# Patient Record
Sex: FEMALE | Race: WHITE | NOT HISPANIC OR LATINO | Employment: OTHER | URBAN - METROPOLITAN AREA
[De-identification: names, ages, dates, MRNs, and addresses within clinical notes are randomized per-mention and may not be internally consistent; named-entity substitution may affect disease eponyms.]

---

## 2017-06-01 ENCOUNTER — ALLSCRIPTS OFFICE VISIT (OUTPATIENT)
Dept: OTHER | Facility: OTHER | Age: 67
End: 2017-06-01

## 2017-06-01 DIAGNOSIS — M25.571 PAIN IN RIGHT ANKLE: ICD-10-CM

## 2017-06-01 DIAGNOSIS — Z13.6 ENCOUNTER FOR SCREENING FOR CARDIOVASCULAR DISORDERS: ICD-10-CM

## 2017-06-01 DIAGNOSIS — Z12.31 ENCOUNTER FOR SCREENING MAMMOGRAM FOR MALIGNANT NEOPLASM OF BREAST: ICD-10-CM

## 2017-06-01 DIAGNOSIS — R04.0 EPISTAXIS: ICD-10-CM

## 2017-07-31 ENCOUNTER — GENERIC CONVERSION - ENCOUNTER (OUTPATIENT)
Dept: OTHER | Facility: OTHER | Age: 67
End: 2017-07-31

## 2017-10-31 ENCOUNTER — GENERIC CONVERSION - ENCOUNTER (OUTPATIENT)
Dept: OTHER | Facility: OTHER | Age: 67
End: 2017-10-31

## 2017-10-31 DIAGNOSIS — N39.0 URINARY TRACT INFECTION: ICD-10-CM

## 2018-01-10 NOTE — MISCELLANEOUS
Message   Recorded as Task   Date: 10/31/2017 01:35 PM, Created By: Nupur Suarez   Task Name: Medical Complaint Callback   Assigned To: Alejandro ROSSI,TEAM   Regarding Patient: Lisa Rhodes, Status: Active   Comment:    Cristal Romano - 31 Oct 2017 1:35 PM     TASK CREATED  Caller: Self; Medical Complaint; [phone] (Home)  PATIENT CALLED SAYING SHE HAS AN INFECTION AND WANTS TO HAVE A CULTURE DONE PLEASE CALL HER BACK  [address] - 31 Oct 2017 2:02 PM     TASK EDITED  SPOKE TO PT, HAS PRESSURE AND FREQ  SLIP FOR CULTURE FAXED TO [facility] [phone]  WILL DIRECT TO DR Alem Julian FOR ANTIBIOTIC ORDER  PER DR AARYA BACTRIM DS BID X'S ONE WEEK ERX TO PHARM  PT NOTIFIED  1        1 Amended By: Royce Resendiz; Oct 31 2017 2:38 PM EST    Active Problems   1  Ankle pain, right (719 47) (M22 571)  2  Displaced fracture of second metatarsal bone of right foot, initial encounter  3  Encounter for screening for cardiovascular disorders (V81 2) (Z13 6)  4  Encounter for screening mammogram for breast cancer (V76 12) (Z12 31)  5  MS (multiple sclerosis) (340) (G35)  6  Recurrent epistaxis (784 7) (R04 0)    Current Meds  1  Aspirin-Dipyridamole ER  MG Oral Capsule Extended Release 12 Hour; TAKE 1   CAPSULE TWICE DAILY; Therapy: 55LHM1130 to (Evaluate:69Oif7698); Last Rx:01Jun2017 Ordered  2  Baclofen 10 MG Oral Tablet; TAKE 1 TABLET 4 TIMES DAILY; Therapy: (Recorded:01Jun2017) to Recorded  3  Copaxone 40 MG/ML Subcutaneous Solution Prefilled Syringe (Glatiramer Acetate); Inject every other day; Therapy: 38QQH8601 to (Last Rx:01Jun2017) Ordered  4  Lyrica 50 MG Oral Capsule; TAKE 1 CAPSULE Bedtime; Therapy: (Recorded:01Jun2017) to Recorded  5  Methadone HCl - 10 MG Oral Tablet; TAKE 1 TABLET 3 TIMES DAILY; Therapy: (Recorded:01Jun2017) to Recorded  6  Morphine Sulfate 15 MG/12HR TBCR; TAKE 1 TABLET 3 times daily;    Therapy: (Recorded:01Jun2017) to Recorded  7  Ritalin 20 MG Oral Tablet (Methylphenidate HCl); TAKE 1 TABLET 3 TIMES DAILY; Therapy: (Recorded:01Jun2017) to Recorded  8  Synthroid 200 MCG Oral Tablet (Levothyroxine Sodium); TAKE 1 TABLET DAILY AS   DIRECTED; Therapy: (Recorded:01Jun2017) to Recorded  9  Wellbutrin  MG Oral Tablet Extended Release 12 Hour (BuPROPion HCl ER   (SR)); TAKE 1 TABLET DAILY; Therapy: (Recorded:01Jun2017) to Recorded    Allergies   1  Penicillins   2  No Known Environmental Allergies  3   No Known Food Allergies    Signatures   Electronically signed by : Neva Silva RN; Oct 31 2017  2:38PM EST                       (Author)

## 2018-01-14 VITALS — TEMPERATURE: 98.7 F | HEART RATE: 90 BPM | SYSTOLIC BLOOD PRESSURE: 116 MMHG | DIASTOLIC BLOOD PRESSURE: 78 MMHG

## 2018-01-17 NOTE — MISCELLANEOUS
Message   Recorded as Task   Date: 07/31/2017 12:57 PM, Created By: Gary Cramer   Task Name: Call Back   Assigned To: Alejandro ROSSI,TEAM   Regarding Patient: Javier Huertas, Status: Active   Comment:    RosalinaCristal - 31 Jul 2017 12:57 PM     TASK CREATED  Caller: BUSHRA, Spouse; Other; [phone]  PATIENTS  BUSHRA CALLED TO SPEAK WITH A NURSE ABOUT CATH SUPPLIES   Cristal Romano - 31 Jul 2017 1:43 PM     TASK REASSIGNED: Previously Assigned To [id number] [employer] - 31 Jul 2017 2:13 PM     TASK EDITED  Called [facility] to renew prescription for 14fr straight caths  CIC 6x's daily per  and requesting to increase to 7x's  Called [facility] at [phone]  Will clarify order with Dr Sina Walsh on 8/1  Emergency supply shipped today  Will notify   Pt  stating she cath's QID  Active Problems    1  Ankle pain, right (719 47) (M27 571)   2  Displaced fracture of second metatarsal bone of right foot, initial encounter (825 25)   (S92 321A)   3  Encounter for screening for cardiovascular disorders (V81 2) (Z13 6)   4  Encounter for screening mammogram for breast cancer (V76 12) (Z12 31)   5  MS (multiple sclerosis) (340) (G35)   6  Recurrent epistaxis (784 7) (R04 0)    Current Meds   1  Aspirin-Dipyridamole ER  MG Oral Capsule Extended Release 12 Hour; TAKE 1   CAPSULE TWICE DAILY; Therapy: 70JJL8517 to (Evaluate:27Izp1701); Last Rx:01Jun2017 Ordered   2  Baclofen 10 MG Oral Tablet; TAKE 1 TABLET 4 TIMES DAILY; Therapy: (Recorded:01Jun2017) to Recorded   3  Copaxone 40 MG/ML Subcutaneous Solution Prefilled Syringe; Inject every other day; Therapy: 70HIG7900 to (Last Rx:01Jun2017) Ordered   4  Lyrica 50 MG Oral Capsule; TAKE 1 CAPSULE Bedtime; Therapy: (Recorded:01Jun2017) to Recorded   5  Methadone HCl - 10 MG Oral Tablet; TAKE 1 TABLET 3 TIMES DAILY; Therapy: (Recorded:01Jun2017) to Recorded   6   Morphine Sulfate 15 MG/12HR TBCR; TAKE 1 TABLET 3 times daily; Therapy: (Recorded:01Jun2017) to Recorded   7  Ritalin 20 MG Oral Tablet (Methylphenidate HCl); TAKE 1 TABLET 3 TIMES DAILY; Therapy: (Recorded:01Jun2017) to Recorded   8  Synthroid 200 MCG Oral Tablet (Levothyroxine Sodium); TAKE 1 TABLET DAILY AS   DIRECTED; Therapy: (Recorded:01Jun2017) to Recorded   9  Wellbutrin  MG Oral Tablet Extended Release 12 Hour (BuPROPion HCl ER   (SR)); TAKE 1 TABLET DAILY; Therapy: (Recorded:01Jun2017) to Recorded    Allergies    1  Penicillins    2  No Known Environmental Allergies   3   No Known Food Allergies    Signatures   Electronically signed by : Jo Ann Richardson RN; Jul 31 2017  2:14PM EST                       (Author)

## 2018-09-25 ENCOUNTER — TRANSCRIBE ORDERS (OUTPATIENT)
Dept: ADMINISTRATIVE | Facility: HOSPITAL | Age: 68
End: 2018-09-25

## 2018-09-25 ENCOUNTER — APPOINTMENT (OUTPATIENT)
Dept: LAB | Facility: IMAGING CENTER | Age: 68
End: 2018-09-25
Payer: MEDICARE

## 2018-09-25 DIAGNOSIS — N39.0 URINARY TRACT INFECTION: ICD-10-CM

## 2018-09-25 PROCEDURE — 87086 URINE CULTURE/COLONY COUNT: CPT

## 2018-09-25 PROCEDURE — 87077 CULTURE AEROBIC IDENTIFY: CPT

## 2018-09-25 PROCEDURE — 87186 SC STD MICRODIL/AGAR DIL: CPT

## 2018-09-26 ENCOUNTER — TRANSCRIBE ORDERS (OUTPATIENT)
Dept: ADMINISTRATIVE | Facility: HOSPITAL | Age: 68
End: 2018-09-26

## 2018-09-27 DIAGNOSIS — N30.00 ACUTE CYSTITIS WITHOUT HEMATURIA: Primary | ICD-10-CM

## 2018-09-27 LAB — BACTERIA UR CULT: ABNORMAL

## 2018-09-27 RX ORDER — CIPROFLOXACIN 250 MG/1
250 TABLET, FILM COATED ORAL EVERY 12 HOURS SCHEDULED
Qty: 14 TABLET | Refills: 0 | Status: SHIPPED | OUTPATIENT
Start: 2018-09-27 | End: 2018-10-04

## 2018-10-05 ENCOUNTER — TELEPHONE (OUTPATIENT)
Dept: UROLOGY | Facility: AMBULATORY SURGERY CENTER | Age: 68
End: 2018-10-05

## 2018-10-05 NOTE — TELEPHONE ENCOUNTER
Pt states that she has a lot of bladder pressure, she is emptying due to CIC  Pt states that she is CIC more than BID  Per culture she is on the correct ABX  After looking into chart the last time she was here was in 2016 and was seen by Raine Rodriguez  Last time she was seen by Dr Oscar Alvarado was 2015  Pt was given appt with Dr Oscar Alvarado 10/08/18 to discuss

## 2018-10-25 ENCOUNTER — TELEPHONE (OUTPATIENT)
Dept: UROLOGY | Facility: MEDICAL CENTER | Age: 68
End: 2018-10-25

## 2018-10-25 ENCOUNTER — TRANSCRIBE ORDERS (OUTPATIENT)
Dept: ADMINISTRATIVE | Facility: HOSPITAL | Age: 68
End: 2018-10-25

## 2018-10-25 DIAGNOSIS — R30.0 BURNING WITH URINATION: Primary | ICD-10-CM

## 2018-10-25 NOTE — TELEPHONE ENCOUNTER
Spoke with the patient; She complains of urinary urgency and burning with urination  I've requested she have a urine culture  She'd like to use the [facility] lab in [place]  Order faxed to [phone]

## 2018-10-26 ENCOUNTER — TRANSCRIBE ORDERS (OUTPATIENT)
Dept: ADMINISTRATIVE | Facility: HOSPITAL | Age: 68
End: 2018-10-26

## 2018-10-26 ENCOUNTER — APPOINTMENT (OUTPATIENT)
Dept: LAB | Facility: IMAGING CENTER | Age: 68
End: 2018-10-26
Payer: MEDICARE

## 2018-10-26 DIAGNOSIS — R30.0 BURNING WITH URINATION: ICD-10-CM

## 2018-10-26 PROCEDURE — 87086 URINE CULTURE/COLONY COUNT: CPT

## 2018-10-26 PROCEDURE — 87077 CULTURE AEROBIC IDENTIFY: CPT

## 2018-10-26 PROCEDURE — 87186 SC STD MICRODIL/AGAR DIL: CPT

## 2018-10-28 LAB — BACTERIA UR CULT: ABNORMAL

## 2018-10-30 DIAGNOSIS — N30.00 ACUTE CYSTITIS WITHOUT HEMATURIA: Primary | ICD-10-CM

## 2018-10-30 RX ORDER — SULFAMETHOXAZOLE AND TRIMETHOPRIM 800; 160 MG/1; MG/1
1 TABLET ORAL EVERY 12 HOURS SCHEDULED
Qty: 14 TABLET | Refills: 0 | Status: SHIPPED | OUTPATIENT
Start: 2018-10-30 | End: 2018-11-06

## 2018-11-01 NOTE — TELEPHONE ENCOUNTER
Pt notified that her  was notified of  positive culture on 10/30/18 and that antibiotics were escribed on the 10/30/18  Pt stated that [facility] told her there was no antibiotics ready for her  Contacted [facility] and they stated they have the RX ready for the pt

## 2018-12-05 ENCOUNTER — TELEPHONE (OUTPATIENT)
Dept: UROLOGY | Facility: MEDICAL CENTER | Age: 68
End: 2018-12-05

## 2018-12-05 ENCOUNTER — TRANSCRIBE ORDERS (OUTPATIENT)
Dept: ADMINISTRATIVE | Facility: HOSPITAL | Age: 68
End: 2018-12-05

## 2018-12-05 ENCOUNTER — APPOINTMENT (OUTPATIENT)
Dept: LAB | Facility: IMAGING CENTER | Age: 68
End: 2018-12-05
Payer: MEDICARE

## 2018-12-05 DIAGNOSIS — N30.90 CYSTITIS: Primary | ICD-10-CM

## 2018-12-05 DIAGNOSIS — N30.90 CYSTITIS: ICD-10-CM

## 2018-12-05 PROCEDURE — 87077 CULTURE AEROBIC IDENTIFY: CPT

## 2018-12-05 PROCEDURE — 87147 CULTURE TYPE IMMUNOLOGIC: CPT

## 2018-12-05 PROCEDURE — 87186 SC STD MICRODIL/AGAR DIL: CPT

## 2018-12-05 PROCEDURE — 87086 URINE CULTURE/COLONY COUNT: CPT

## 2018-12-07 ENCOUNTER — OFFICE VISIT (OUTPATIENT)
Dept: INTERNAL MEDICINE CLINIC | Facility: CLINIC | Age: 68
End: 2018-12-07
Payer: MEDICARE

## 2018-12-07 VITALS
TEMPERATURE: 99 F | OXYGEN SATURATION: 94 % | SYSTOLIC BLOOD PRESSURE: 118 MMHG | HEART RATE: 110 BPM | DIASTOLIC BLOOD PRESSURE: 76 MMHG

## 2018-12-07 DIAGNOSIS — R11.0 NAUSEA: Primary | ICD-10-CM

## 2018-12-07 DIAGNOSIS — R53.83 FATIGUE, UNSPECIFIED TYPE: ICD-10-CM

## 2018-12-07 DIAGNOSIS — Z12.31 SCREENING MAMMOGRAM, ENCOUNTER FOR: ICD-10-CM

## 2018-12-07 DIAGNOSIS — E55.9 VITAMIN D DEFICIENCY: ICD-10-CM

## 2018-12-07 DIAGNOSIS — G35 MULTIPLE SCLEROSIS (HCC): ICD-10-CM

## 2018-12-07 DIAGNOSIS — Z13.220 SCREENING CHOLESTEROL LEVEL: ICD-10-CM

## 2018-12-07 DIAGNOSIS — Z23 ENCOUNTER FOR IMMUNIZATION: ICD-10-CM

## 2018-12-07 DIAGNOSIS — Z11.59 ENCOUNTER FOR HEPATITIS C SCREENING TEST FOR LOW RISK PATIENT: ICD-10-CM

## 2018-12-07 DIAGNOSIS — K59.03 DRUG-INDUCED CONSTIPATION: ICD-10-CM

## 2018-12-07 LAB — BACTERIA UR CULT: ABNORMAL

## 2018-12-07 PROCEDURE — G0008 ADMIN INFLUENZA VIRUS VAC: HCPCS

## 2018-12-07 PROCEDURE — 90662 IIV NO PRSV INCREASED AG IM: CPT

## 2018-12-07 PROCEDURE — 99213 OFFICE O/P EST LOW 20 MIN: CPT | Performed by: FAMILY MEDICINE

## 2018-12-07 RX ORDER — PROMETHAZINE HYDROCHLORIDE 25 MG/1
25 TABLET ORAL EVERY 6 HOURS PRN
Qty: 60 TABLET | Refills: 5 | Status: SHIPPED | OUTPATIENT
Start: 2018-12-07

## 2018-12-07 RX ORDER — PROMETHAZINE HYDROCHLORIDE 25 MG/1
25 SUPPOSITORY RECTAL EVERY 6 HOURS PRN
Qty: 12 EACH | Refills: 5 | Status: SHIPPED | OUTPATIENT
Start: 2018-12-07

## 2018-12-07 NOTE — PROGRESS NOTES
Assessment/Plan:    No problem-specific Assessment & Plan notes found for this encounter  Problem List Items Addressed This Visit        Digestive    Drug-induced constipation       Other    Vitamin D deficiency    Relevant Orders    Vitamin D 25 hydroxy      Other Visit Diagnoses     Nausea    -  Primary    Relevant Medications    promethazine (PHENERGAN) 25 mg suppository    promethazine (PHENERGAN) 25 mg tablet    Other Relevant Orders    Comprehensive metabolic panel    CBC and differential    Multiple sclerosis ([facility] [address] )        Relevant Orders    Ambulatory referral to Physical Medicine Rehab    Fatigue, unspecified type        Relevant Orders    TSH baseline    T4, free    Vitamin D 25 hydroxy    Encounter for hepatitis C screening test for low risk patient        Relevant Orders    Hepatitis C antibody    Screening mammogram, encounter for        Relevant Orders    Mammo screening bilateral w cad    Screening cholesterol level        Relevant Orders    Lipid panel    Encounter for immunization        Relevant Orders    influenza vaccine, [id number], high-dose, PF 0 5 mL, for patients 65 yr+ (FLUZONE HIGH-DOSE) (Completed)              Discussion, old records and medication list reviewed with patient  Refer to Dr Don physical medicine rehab for MS and medical marijuana  Will put in consult for [facility]  for possible physical therapy as well as home lab draws  Schedule mammogram and will discuss bone density study at next visit  Flu shot given today and will need pneumonia vaccine at next visit  Laboratory data reviewed from 2016 and will order lab work for visit in January  She has an appointment for EGD scheduled and they will most likely order colonoscopy as well  Refills given for Phenergan suppository as well as Phenergan oral     Subjective:  Establish care     Patient ID: Pernell Bateman is a 70 y o  female  HPI     78-year-old female with history of MS since 79    Following with Neurology on a regular basis  Coming here today for establishing care  Has not had a primary care doctor in several years and has not had Pap test mammogram or any other preventative measures  Is due for lab work  Last lab work in system are at [email or web address] in 2016  MS is stable and nonprogressive however she can stand and pivot but cannot walk anymore  She is basically wheelchair confined   is primary caretaker      The following portions of the patient's history were reviewed and updated as appropriate: allergies, current medications, past family history, past medical history, past social history, past surgical history and problem list     Review of Systems      Constitutional:  Denies fever or chills   Eyes:  Denies change in visual acuity   HENT:  Denies nasal congestion or sore throat   Respiratory:  Denies cough or shortness of breath or wheezing  Cardiovascular:  Denies palpitations or chest pain  GI:  Denies abdominal pain,  or vomiting, has intermittent nausea as well as diarrhea and constipation    Stools are mostly constipated however does not abut diarrhea and her all of her specialists have told her it is from [address]:  Denies rash   Neurologic:  Denies headache or focal weakness        Objective:      /76 (BP Location: Left arm, Patient Position: Sitting, Cuff Size: Adult)   Pulse (!) 110   Temp 99 °F (37 2 °C) (Oral)   SpO2 94%          Physical Exam      Constitutional:  Well developed, well nourished, no acute distress, non-toxic appearance   Eyes:  PERRL, conjunctiva normal , non icteric sclera  HENT:  Atraumatic, oropharynx moist  Neck-  supple   Respiratory:  CTA b/l, normal breath sounds, no rales, no wheezing   Cardiovascular:  RRR, no murmurs, no LE edema b/l  GI:  Soft, nondistended, normal bowel sounds x 4, nontender, no organomegaly, no mass, no rebound, no guarding   Neurologic:  no focal deficits noted   Psychiatric:  Speech and behavior appropriate , AAO x 3

## 2018-12-12 ENCOUNTER — TELEPHONE (OUTPATIENT)
Dept: UROLOGY | Facility: AMBULATORY SURGERY CENTER | Age: 68
End: 2018-12-12

## 2018-12-12 DIAGNOSIS — N30.00 ACUTE CYSTITIS WITHOUT HEMATURIA: Primary | ICD-10-CM

## 2018-12-12 RX ORDER — NITROFURANTOIN 25; 75 MG/1; MG/1
100 CAPSULE ORAL 2 TIMES DAILY
Qty: 14 CAPSULE | Refills: 0 | Status: SHIPPED | OUTPATIENT
Start: 2018-12-12 | End: 2019-01-11 | Stop reason: ALTCHOICE

## 2018-12-12 NOTE — TELEPHONE ENCOUNTER
Spoke to pt  States she is having pressure, freq urine cloudy  Afebrile  12/5 culture showed only 40-49 thous cfu/ml of Enterococcus  Will direct to Dr Debra Jimenez if he would like to repeat culture or treat symptoms

## 2018-12-21 ENCOUNTER — APPOINTMENT (OUTPATIENT)
Dept: LAB | Facility: IMAGING CENTER | Age: 68
End: 2018-12-21
Payer: MEDICARE

## 2018-12-21 ENCOUNTER — TRANSCRIBE ORDERS (OUTPATIENT)
Dept: ADMINISTRATIVE | Facility: HOSPITAL | Age: 68
End: 2018-12-21

## 2018-12-21 DIAGNOSIS — G35 MULTIPLE SCLEROSIS (HCC): ICD-10-CM

## 2018-12-21 DIAGNOSIS — M79.662 PAIN AND SWELLING OF LEFT LOWER LEG: ICD-10-CM

## 2018-12-21 DIAGNOSIS — M79.89 PAIN AND SWELLING OF LEFT LOWER LEG: Primary | ICD-10-CM

## 2018-12-21 DIAGNOSIS — Z11.59 ENCOUNTER FOR HEPATITIS C SCREENING TEST FOR LOW RISK PATIENT: ICD-10-CM

## 2018-12-21 DIAGNOSIS — M79.662 PAIN AND SWELLING OF LEFT LOWER LEG: Primary | ICD-10-CM

## 2018-12-21 DIAGNOSIS — Z51.81 THERAPEUTIC DRUG MONITORING: ICD-10-CM

## 2018-12-21 DIAGNOSIS — Z13.220 SCREENING CHOLESTEROL LEVEL: ICD-10-CM

## 2018-12-21 DIAGNOSIS — E55.9 VITAMIN D DEFICIENCY, UNSPECIFIED: ICD-10-CM

## 2018-12-21 DIAGNOSIS — E55.9 VITAMIN D DEFICIENCY: ICD-10-CM

## 2018-12-21 DIAGNOSIS — R11.0 NAUSEA: ICD-10-CM

## 2018-12-21 DIAGNOSIS — R53.83 FATIGUE, UNSPECIFIED TYPE: ICD-10-CM

## 2018-12-21 DIAGNOSIS — M79.89 PAIN AND SWELLING OF LEFT LOWER LEG: ICD-10-CM

## 2018-12-21 LAB
25(OH)D3 SERPL-MCNC: 19.4 NG/ML (ref 30–100)
ALBUMIN SERPL BCP-MCNC: 3.2 G/DL (ref 3.5–5)
ALP SERPL-CCNC: 94 U/L (ref 46–116)
ALT SERPL W P-5'-P-CCNC: 60 U/L (ref 12–78)
ANION GAP SERPL CALCULATED.3IONS-SCNC: 5 MMOL/L (ref 4–13)
AST SERPL W P-5'-P-CCNC: 43 U/L (ref 5–45)
BASOPHILS # BLD AUTO: 0.04 THOUSANDS/ΜL (ref 0–0.1)
BASOPHILS NFR BLD AUTO: 1 % (ref 0–1)
BILIRUB SERPL-MCNC: 0.32 MG/DL (ref 0.2–1)
BUN SERPL-MCNC: 14 MG/DL (ref 5–25)
CALCIUM SERPL-MCNC: 9.2 MG/DL (ref 8.3–10.1)
CHLORIDE SERPL-SCNC: 104 MMOL/L (ref 100–108)
CHOLEST SERPL-MCNC: 237 MG/DL (ref 50–200)
CO2 SERPL-SCNC: 30 MMOL/L (ref 21–32)
CREAT SERPL-MCNC: 0.68 MG/DL (ref 0.6–1.3)
EOSINOPHIL # BLD AUTO: 0.17 THOUSAND/ΜL (ref 0–0.61)
EOSINOPHIL NFR BLD AUTO: 2 % (ref 0–6)
ERYTHROCYTE [DISTWIDTH] IN BLOOD BY AUTOMATED COUNT: 13.8 % (ref 11.6–15.1)
FOLATE SERPL-MCNC: 18.6 NG/ML (ref 3.1–17.5)
GFR SERPL CREATININE-BSD FRML MDRD: 90 ML/MIN/1.73SQ M
GLUCOSE P FAST SERPL-MCNC: 94 MG/DL (ref 65–99)
HCT VFR BLD AUTO: 39.4 % (ref 34.8–46.1)
HDLC SERPL-MCNC: 55 MG/DL (ref 40–60)
HGB BLD-MCNC: 12.2 G/DL (ref 11.5–15.4)
IMM GRANULOCYTES # BLD AUTO: 0.02 THOUSAND/UL (ref 0–0.2)
IMM GRANULOCYTES NFR BLD AUTO: 0 % (ref 0–2)
LDLC SERPL CALC-MCNC: 146 MG/DL (ref 0–100)
LYMPHOCYTES # BLD AUTO: 1.62 THOUSANDS/ΜL (ref 0.6–4.47)
LYMPHOCYTES NFR BLD AUTO: 21 % (ref 14–44)
MCH RBC QN AUTO: 30.1 PG (ref 26.8–34.3)
MCHC RBC AUTO-ENTMCNC: 31 G/DL (ref 31.4–37.4)
MCV RBC AUTO: 97 FL (ref 82–98)
MONOCYTES # BLD AUTO: 0.4 THOUSAND/ΜL (ref 0.17–1.22)
MONOCYTES NFR BLD AUTO: 5 % (ref 4–12)
NEUTROPHILS # BLD AUTO: 5.44 THOUSANDS/ΜL (ref 1.85–7.62)
NEUTS SEG NFR BLD AUTO: 71 % (ref 43–75)
NONHDLC SERPL-MCNC: 182 MG/DL
NRBC BLD AUTO-RTO: 0 /100 WBCS
PLATELET # BLD AUTO: 213 THOUSANDS/UL (ref 149–390)
PMV BLD AUTO: 9.6 FL (ref 8.9–12.7)
POTASSIUM SERPL-SCNC: 4 MMOL/L (ref 3.5–5.3)
PROT SERPL-MCNC: 6.6 G/DL (ref 6.4–8.2)
RBC # BLD AUTO: 4.05 MILLION/UL (ref 3.81–5.12)
SODIUM SERPL-SCNC: 139 MMOL/L (ref 136–145)
T4 FREE SERPL-MCNC: 0.75 NG/DL (ref 0.76–1.46)
TRIGL SERPL-MCNC: 181 MG/DL
TSH SERPL DL<=0.05 MIU/L-ACNC: 0.06 UIU/ML
TSH SERPL DL<=0.05 MIU/L-ACNC: 0.07 UIU/ML (ref 0.36–3.74)
VIT B12 SERPL-MCNC: 343 PG/ML (ref 100–900)
WBC # BLD AUTO: 7.69 THOUSAND/UL (ref 4.31–10.16)

## 2018-12-21 PROCEDURE — 82607 VITAMIN B-12: CPT

## 2018-12-21 PROCEDURE — 86147 CARDIOLIPIN ANTIBODY EA IG: CPT

## 2018-12-21 PROCEDURE — 84425 ASSAY OF VITAMIN B-1: CPT

## 2018-12-21 PROCEDURE — 84439 ASSAY OF FREE THYROXINE: CPT

## 2018-12-21 PROCEDURE — 84207 ASSAY OF VITAMIN B-6: CPT

## 2018-12-21 PROCEDURE — 84443 ASSAY THYROID STIM HORMONE: CPT

## 2018-12-21 PROCEDURE — 86803 HEPATITIS C AB TEST: CPT

## 2018-12-21 PROCEDURE — 81241 F5 GENE: CPT

## 2018-12-21 PROCEDURE — 80053 COMPREHEN METABOLIC PANEL: CPT

## 2018-12-21 PROCEDURE — 85025 COMPLETE CBC W/AUTO DIFF WBC: CPT

## 2018-12-21 PROCEDURE — 85613 RUSSELL VIPER VENOM DILUTED: CPT

## 2018-12-21 PROCEDURE — 36415 COLL VENOUS BLD VENIPUNCTURE: CPT

## 2018-12-21 PROCEDURE — 85732 THROMBOPLASTIN TIME PARTIAL: CPT

## 2018-12-21 PROCEDURE — 86146 BETA-2 GLYCOPROTEIN ANTIBODY: CPT

## 2018-12-21 PROCEDURE — 80061 LIPID PANEL: CPT

## 2018-12-21 PROCEDURE — 82306 VITAMIN D 25 HYDROXY: CPT

## 2018-12-21 PROCEDURE — 85705 THROMBOPLASTIN INHIBITION: CPT

## 2018-12-21 PROCEDURE — 82746 ASSAY OF FOLIC ACID SERUM: CPT

## 2018-12-22 LAB — HCV AB SER QL: NORMAL

## 2018-12-27 LAB — F5 GENE MUT ANL BLD/T: NORMAL

## 2018-12-28 LAB
VIT B1 BLD-SCNC: 126.3 NMOL/L (ref 66.5–200)
VIT B6 SERPL-MCNC: 12.7 UG/L (ref 2–32.8)

## 2018-12-31 ENCOUNTER — TRANSITIONAL CARE MANAGEMENT (OUTPATIENT)
Dept: INTERNAL MEDICINE CLINIC | Facility: CLINIC | Age: 68
End: 2018-12-31

## 2018-12-31 RX ORDER — ASPIRIN AND DIPYRIDAMOLE 25; 200 MG/1; MG/1
1 CAPSULE, EXTENDED RELEASE ORAL EVERY 12 HOURS SCHEDULED
COMMUNITY

## 2018-12-31 RX ORDER — CEPHALEXIN 500 MG/1
500 CAPSULE ORAL 4 TIMES DAILY
COMMUNITY
Start: 2018-12-29 | End: 2019-01-01

## 2018-12-31 RX ORDER — GLATIRAMER ACETATE 20 MG/ML
20 INJECTION, SOLUTION SUBCUTANEOUS
COMMUNITY
End: 2020-09-22 | Stop reason: ALTCHOICE

## 2018-12-31 RX ORDER — PROCHLORPERAZINE 25 MG
25 SUPPOSITORY, RECTAL RECTAL DAILY PRN
COMMUNITY

## 2018-12-31 RX ORDER — ALENDRONATE SODIUM 70 MG/1
70 TABLET ORAL
COMMUNITY
End: 2019-01-11 | Stop reason: SDUPTHER

## 2019-01-02 LAB — MISCELLANEOUS LAB TEST RESULT: NORMAL

## 2019-01-10 RX ORDER — PANTOPRAZOLE SODIUM 40 MG/1
40 TABLET, DELAYED RELEASE ORAL DAILY
COMMUNITY
Start: 2018-12-03 | End: 2019-04-16 | Stop reason: SDUPTHER

## 2019-01-10 RX ORDER — LINACLOTIDE 290 UG/1
1 CAPSULE, GELATIN COATED ORAL DAILY
COMMUNITY
Start: 2018-11-13 | End: 2022-07-06

## 2019-01-11 ENCOUNTER — OFFICE VISIT (OUTPATIENT)
Dept: INTERNAL MEDICINE CLINIC | Facility: CLINIC | Age: 69
End: 2019-01-11
Payer: MEDICARE

## 2019-01-11 VITALS
OXYGEN SATURATION: 91 % | TEMPERATURE: 98.9 F | DIASTOLIC BLOOD PRESSURE: 58 MMHG | SYSTOLIC BLOOD PRESSURE: 104 MMHG | HEART RATE: 74 BPM

## 2019-01-11 DIAGNOSIS — E55.9 VITAMIN D DEFICIENCY: Primary | ICD-10-CM

## 2019-01-11 DIAGNOSIS — E78.00 HYPERCHOLESTEREMIA: ICD-10-CM

## 2019-01-11 DIAGNOSIS — K59.03 DRUG-INDUCED CONSTIPATION: ICD-10-CM

## 2019-01-11 DIAGNOSIS — G89.4 CHRONIC PAIN SYNDROME: ICD-10-CM

## 2019-01-11 DIAGNOSIS — Z23 NEED FOR PNEUMOCOCCAL VACCINATION: ICD-10-CM

## 2019-01-11 DIAGNOSIS — G35 MULTIPLE SCLEROSIS (HCC): ICD-10-CM

## 2019-01-11 DIAGNOSIS — M81.0 POSTMENOPAUSAL OSTEOPOROSIS: ICD-10-CM

## 2019-01-11 DIAGNOSIS — Z02.82 ADOPTED: ICD-10-CM

## 2019-01-11 PROBLEM — Z78.9 ADOPTED: Status: ACTIVE | Noted: 2019-01-11

## 2019-01-11 PROCEDURE — G0009 ADMIN PNEUMOCOCCAL VACCINE: HCPCS

## 2019-01-11 PROCEDURE — 99495 TRANSJ CARE MGMT MOD F2F 14D: CPT | Performed by: FAMILY MEDICINE

## 2019-01-11 PROCEDURE — 90732 PPSV23 VACC 2 YRS+ SUBQ/IM: CPT

## 2019-01-11 RX ORDER — ALENDRONATE SODIUM 70 MG/1
70 TABLET ORAL
Qty: 12 TABLET | Refills: 3 | Status: SHIPPED | OUTPATIENT
Start: 2019-01-11 | End: 2019-01-11 | Stop reason: SDUPTHER

## 2019-01-11 RX ORDER — PREGABALIN 50 MG/1
50 CAPSULE ORAL 2 TIMES DAILY
Qty: 180 CAPSULE | Refills: 1 | Status: SHIPPED | OUTPATIENT
Start: 2019-01-11 | End: 2019-05-20 | Stop reason: SDUPTHER

## 2019-01-11 RX ORDER — ERGOCALCIFEROL 1.25 MG/1
50000 CAPSULE ORAL WEEKLY
Qty: 12 CAPSULE | Refills: 1 | Status: SHIPPED | OUTPATIENT
Start: 2019-01-11 | End: 2019-05-20 | Stop reason: SDUPTHER

## 2019-01-11 RX ORDER — ALPRAZOLAM 1 MG/1
1 TABLET ORAL 3 TIMES DAILY PRN
Qty: 90 TABLET | Refills: 0 | Status: CANCELLED | OUTPATIENT
Start: 2019-01-11

## 2019-01-11 RX ORDER — ALENDRONATE SODIUM 70 MG/1
70 TABLET ORAL
Qty: 12 TABLET | Refills: 3 | Status: SHIPPED | OUTPATIENT
Start: 2019-01-11 | End: 2019-05-20 | Stop reason: SDUPTHER

## 2019-01-11 NOTE — PROGRESS NOTES
Assessment/Plan:    No problem-specific Assessment & Plan notes found for this encounter  Problem List Items Addressed This Visit        Digestive    Drug-induced constipation       Nervous and Auditory    Multiple sclerosis (HCC)    Relevant Medications    pregabalin (LYRICA) 50 mg capsule       Other    Vitamin D deficiency - Primary    Relevant Medications    ergocalciferol (VITAMIN D2) 50,000 units    Other Relevant Orders    Vitamin D 25 hydroxy    Chronic pain    Adopted      Other Visit Diagnoses     Hypercholesteremia        Relevant Orders    Lipid panel    Postmenopausal osteoporosis        Relevant Medications    alendronate (FOSAMAX) 70 mg tablet    Other Relevant Orders    DXA bone density spine hip and pelvis    Need for pneumococcal vaccination        Relevant Orders    PNEUMOCOCCAL POLYSACCHARIDE VACCINE 23-VALENT =>1YO SQ IM (Completed)            Discussion, an appointment with medical marijuana physician coming up in a few weeks  Will increase her Lyrica to twice a day  Call if any issues or increased somnolence  If not effective will increase the mg he usage  No changes to other medications  Cholesterol reviewed today which is high  No previous ones to compare  Patient has a biological sister who is also high cholesterol  She is not aware family history with mom and dad due to being adopted  Discussed dietary changes and increased activity while sitting to the best of her ability  Hold statin for now repeat lab work in 4 months  Replete vitamin-D with weekly dose  Level is 19  Check labs in 4 months         Subjective:  Hospital follow-up cellulitis     Patient ID: Morelia Cartwright is a 70 y o  female  HPI  "Hospital Course  This is a brief description of the patient's hospital stay; please refer to medical chart for further details  Morelia Cartwright is a 70 y o  female with past medical history as mentioned below   She presented to [facility] on 12/25/2018 with complaint of Left Leg swelling and Left foot pain and redness   She was diagnosed with Left Leg cellulitis  She was started on keflex opt  However pain and swelling did not improve  Upon evaluation patient had mild erythema noted along lateral foot  However significant swelling along her thigh and lower leg  2 duplex on separate occasions both negative for DVT  CT left showed swelling possible myositis  Ancef was started  Pain/swelling/erythema improved but did not resolve  She was transitioned to keflex  Follow up with PCP  "    Update, since being home patient completed her Keflex today  No redness or swelling however there is a usual discrepancy with left lower extremity being larger than right lower extremity  She has pain in the left lower calf however Dopplers done twice in the hospital were negative for DVT  Of note her right lower extremity does not fully extend and stays in a slightly flexed position due to immobilization  She is however able to bear weight and pivot and turn with the assistance of her   She states the pain in her left lower extremity is still there and feels that it is MS related  No changes to any medications were done at home  Hospital course as well as laboratory data and all of her imaging and studies were reviewed with her and her  today  Osteomyelitis was checked for and ruled out      Vitamin-D deficiency  Denies high fatty starchy diet level checked recently is at 19  She is not taking any over-the-counter medications    Hypercholesterolemia, unknown family history from mom and dad since she is adopted however her biological sister has high cholesterol is being treated for    She is not sure where her level has been in the past   LDL is elevated as well as triglycerides      The following portions of the patient's history were reviewed and updated as appropriate: allergies, current medications, past family history, past medical history, past social history, past surgical history and problem list     Review of Systems      Constitutional:  Denies fever or chills   Eyes:  Denies change in visual acuity   HENT:  Denies nasal congestion or sore throat   Respiratory:  Denies cough or shortness of breath or wheezing  Cardiovascular:  Denies palpitations or chest pain  GI:  Denies abdominal pain, nausea, or vomiting  Integument:  Denies rash   Neurologic:  Denies headache or focal weakness  Musculoskeletal, left lower extremity pain, see HPI,      Objective:      /58 (BP Location: Left arm, Patient Position: Sitting, Cuff Size: Standard)   Pulse 74   Temp 98 9 °F (37 2 °C) (Oral)   SpO2 91%          Physical Exam      Constitutional:  Well developed, well nourished, no acute distress, non-toxic appearance   Eyes:  PERRL, conjunctiva normal , non icteric sclera  HENT:  Atraumatic, oropharynx moist  Neck-  supple   Respiratory:  CTA b/l, normal breath sounds, no rales, no wheezing   Cardiovascular:  RRR, no murmurs, no LE edema b/l  GI:  Soft, nondistended, normal bowel sounds x 4, nontender, no organomegaly, no mass, no rebound, no guarding   Neurologic:  no focal deficits noted   Psychiatric:  Speech and behavior appropriate , AAO x 3  Musculoskeletal, in wheelchair, tender left calf but no edema and no erythema and no rashes or increased warmth    Right lower extremity negative exam exam except for being slightly fixed in the flexed position and unable to extend fully

## 2019-04-16 DIAGNOSIS — K21.9 GASTROESOPHAGEAL REFLUX DISEASE WITHOUT ESOPHAGITIS: Primary | ICD-10-CM

## 2019-04-16 RX ORDER — PANTOPRAZOLE SODIUM 40 MG/1
40 TABLET, DELAYED RELEASE ORAL DAILY
Qty: 90 TABLET | Refills: 1 | Status: SHIPPED | OUTPATIENT
Start: 2019-04-16 | End: 2020-01-10 | Stop reason: SDUPTHER

## 2019-05-20 ENCOUNTER — OFFICE VISIT (OUTPATIENT)
Dept: INTERNAL MEDICINE CLINIC | Facility: CLINIC | Age: 69
End: 2019-05-20
Payer: MEDICARE

## 2019-05-20 VITALS
HEART RATE: 88 BPM | SYSTOLIC BLOOD PRESSURE: 100 MMHG | TEMPERATURE: 98.5 F | DIASTOLIC BLOOD PRESSURE: 80 MMHG | OXYGEN SATURATION: 98 %

## 2019-05-20 DIAGNOSIS — E55.9 VITAMIN D DEFICIENCY: ICD-10-CM

## 2019-05-20 DIAGNOSIS — Z12.11 SCREENING FOR COLON CANCER: ICD-10-CM

## 2019-05-20 DIAGNOSIS — F41.9 ANXIETY: ICD-10-CM

## 2019-05-20 DIAGNOSIS — Z23 NEED FOR DIPHTHERIA-TETANUS-PERTUSSIS (TDAP) VACCINE: Primary | ICD-10-CM

## 2019-05-20 DIAGNOSIS — G89.4 CHRONIC PAIN SYNDROME: ICD-10-CM

## 2019-05-20 DIAGNOSIS — K59.03 DRUG-INDUCED CONSTIPATION: ICD-10-CM

## 2019-05-20 DIAGNOSIS — Z99.3 WHEELCHAIR BOUND: ICD-10-CM

## 2019-05-20 DIAGNOSIS — E78.00 HYPERCHOLESTEREMIA: ICD-10-CM

## 2019-05-20 DIAGNOSIS — M81.0 POSTMENOPAUSAL OSTEOPOROSIS: ICD-10-CM

## 2019-05-20 DIAGNOSIS — G35 MULTIPLE SCLEROSIS (HCC): ICD-10-CM

## 2019-05-20 PROCEDURE — 99214 OFFICE O/P EST MOD 30 MIN: CPT | Performed by: FAMILY MEDICINE

## 2019-05-20 RX ORDER — ALPRAZOLAM 1 MG/1
1 TABLET ORAL 2 TIMES DAILY PRN
Qty: 60 TABLET | Refills: 1 | Status: SHIPPED | OUTPATIENT
Start: 2019-05-20 | End: 2019-11-13 | Stop reason: SDUPTHER

## 2019-05-20 RX ORDER — ALENDRONATE SODIUM 70 MG/1
70 TABLET ORAL
Qty: 12 TABLET | Refills: 3 | Status: SHIPPED | OUTPATIENT
Start: 2019-05-20 | End: 2022-04-26 | Stop reason: SDUPTHER

## 2019-05-20 RX ORDER — PREGABALIN 50 MG/1
50 CAPSULE ORAL 2 TIMES DAILY
Qty: 180 CAPSULE | Refills: 1 | Status: SHIPPED | OUTPATIENT
Start: 2019-05-20

## 2019-05-20 RX ORDER — ERGOCALCIFEROL 1.25 MG/1
50000 CAPSULE ORAL WEEKLY
Qty: 12 CAPSULE | Refills: 1 | Status: SHIPPED | OUTPATIENT
Start: 2019-05-20 | End: 2019-10-15 | Stop reason: SDUPTHER

## 2019-05-21 ENCOUNTER — TELEPHONE (OUTPATIENT)
Dept: INTERNAL MEDICINE CLINIC | Facility: CLINIC | Age: 69
End: 2019-05-21

## 2019-05-28 ENCOUNTER — TELEPHONE (OUTPATIENT)
Dept: INTERNAL MEDICINE CLINIC | Facility: CLINIC | Age: 69
End: 2019-05-28

## 2019-08-23 ENCOUNTER — TELEPHONE (OUTPATIENT)
Dept: INTERNAL MEDICINE CLINIC | Facility: CLINIC | Age: 69
End: 2019-08-23

## 2019-08-23 NOTE — TELEPHONE ENCOUNTER
Pt has refills at [facility]  Spoke to   He is aware  No refills needed at this time

## 2019-08-23 NOTE — TELEPHONE ENCOUNTER
Needs refill on Protonix 40 mg sent to [facility], patient requesting a 3 month supply of medication

## 2019-09-27 ENCOUNTER — TELEPHONE (OUTPATIENT)
Dept: INTERNAL MEDICINE CLINIC | Facility: CLINIC | Age: 69
End: 2019-09-27

## 2019-09-27 NOTE — TELEPHONE ENCOUNTER
Needs refill on protonix 40 mg, twice a day, for 3 month supply sent to [facility]

## 2019-09-27 NOTE — TELEPHONE ENCOUNTER
Spoke to pt's  to confirm that the pt is only taking her med once daily  Also called the pharmacy to check if she has refills  Per pharmacist, pt picked up 90 days worth on 08/31/2019 at [facility] pt back and let  know

## 2019-10-07 ENCOUNTER — TELEPHONE (OUTPATIENT)
Dept: INTERNAL MEDICINE CLINIC | Facility: CLINIC | Age: 69
End: 2019-10-07

## 2019-10-07 NOTE — TELEPHONE ENCOUNTER
MED: Vitmain D  SUPPLY: 90Day  PHARMACY: [facility]  PATIENT PHONE #: [phone]  LAST OV: 5/20/2019  UPCOMING OV: 11/18/2019

## 2019-10-15 DIAGNOSIS — E55.9 VITAMIN D DEFICIENCY: ICD-10-CM

## 2019-10-15 RX ORDER — ERGOCALCIFEROL 1.25 MG/1
50000 CAPSULE ORAL WEEKLY
Qty: 12 CAPSULE | Refills: 1 | Status: SHIPPED | OUTPATIENT
Start: 2019-10-15 | End: 2019-12-17 | Stop reason: SDUPTHER

## 2019-11-13 DIAGNOSIS — F41.9 ANXIETY: ICD-10-CM

## 2019-11-13 NOTE — TELEPHONE ENCOUNTER
LOV 05/20/2019  NOV 11/18/2019    Spoke to pt's   They are ok to wait for Dr Sarah Garduno to fill tomorrow, 11/14/2019      PDMP:

## 2019-11-14 RX ORDER — ALPRAZOLAM 1 MG/1
1 TABLET ORAL 2 TIMES DAILY PRN
Qty: 60 TABLET | Refills: 1 | Status: SHIPPED | OUTPATIENT
Start: 2019-11-14 | End: 2020-01-28 | Stop reason: SDUPTHER

## 2019-12-17 ENCOUNTER — TELEPHONE (OUTPATIENT)
Dept: INTERNAL MEDICINE CLINIC | Facility: CLINIC | Age: 69
End: 2019-12-17

## 2019-12-17 DIAGNOSIS — E55.9 VITAMIN D DEFICIENCY: ICD-10-CM

## 2019-12-17 RX ORDER — ERGOCALCIFEROL 1.25 MG/1
50000 CAPSULE ORAL WEEKLY
Qty: 12 CAPSULE | Refills: 1 | Status: SHIPPED | OUTPATIENT
Start: 2019-12-17 | End: 2021-04-02 | Stop reason: SDUPTHER

## 2019-12-17 RX ORDER — ERGOCALCIFEROL 1.25 MG/1
50000 CAPSULE ORAL WEEKLY
Qty: 12 CAPSULE | Refills: 1 | Status: CANCELLED | OUTPATIENT
Start: 2019-12-17

## 2019-12-17 NOTE — TELEPHONE ENCOUNTER
This patient called the office today and needs a refill on her Vit D 50,000 units and the pharmacy is [facility]

## 2020-01-10 DIAGNOSIS — G35 MULTIPLE SCLEROSIS (HCC): Primary | ICD-10-CM

## 2020-01-10 DIAGNOSIS — K21.9 GASTROESOPHAGEAL REFLUX DISEASE WITHOUT ESOPHAGITIS: ICD-10-CM

## 2020-01-10 RX ORDER — BACLOFEN 10 MG/1
10 TABLET ORAL 4 TIMES DAILY
Qty: 120 TABLET | Refills: 5 | Status: SHIPPED | OUTPATIENT
Start: 2020-01-10

## 2020-01-10 RX ORDER — PANTOPRAZOLE SODIUM 40 MG/1
40 TABLET, DELAYED RELEASE ORAL DAILY
Qty: 90 TABLET | Refills: 1 | Status: SHIPPED | OUTPATIENT
Start: 2020-01-10 | End: 2020-11-04 | Stop reason: SDUPTHER

## 2020-01-28 DIAGNOSIS — Z12.11 SCREENING FOR COLON CANCER: Primary | ICD-10-CM

## 2020-01-28 DIAGNOSIS — F41.9 ANXIETY: ICD-10-CM

## 2020-01-28 RX ORDER — ALPRAZOLAM 1 MG/1
1 TABLET ORAL 2 TIMES DAILY PRN
Qty: 60 TABLET | Refills: 1 | Status: SHIPPED | OUTPATIENT
Start: 2020-01-28 | End: 2020-08-11 | Stop reason: SDUPTHER

## 2020-01-28 RX ORDER — ALPRAZOLAM 1 MG/1
1 TABLET ORAL 2 TIMES DAILY PRN
Qty: 60 TABLET | Refills: 1 | Status: CANCELLED | OUTPATIENT
Start: 2020-01-28

## 2020-03-27 ENCOUNTER — TELEPHONE (OUTPATIENT)
Dept: INTERNAL MEDICINE CLINIC | Age: 70
End: 2020-03-27

## 2020-06-01 ENCOUNTER — TRANSCRIBE ORDERS (OUTPATIENT)
Dept: ADMINISTRATIVE | Facility: HOSPITAL | Age: 70
End: 2020-06-01

## 2020-06-04 ENCOUNTER — TRANSCRIBE ORDERS (OUTPATIENT)
Dept: ADMINISTRATIVE | Facility: HOSPITAL | Age: 70
End: 2020-06-04

## 2020-06-04 ENCOUNTER — APPOINTMENT (OUTPATIENT)
Dept: LAB | Facility: IMAGING CENTER | Age: 70
End: 2020-06-04
Payer: MEDICARE

## 2020-06-04 DIAGNOSIS — G35 MULTIPLE SCLEROSIS (HCC): ICD-10-CM

## 2020-06-04 DIAGNOSIS — R39.9 UTI SYMPTOMS: ICD-10-CM

## 2020-06-04 DIAGNOSIS — R39.9 UTI SYMPTOMS: Primary | ICD-10-CM

## 2020-06-04 LAB
BACTERIA UR QL AUTO: ABNORMAL /HPF
BILIRUB UR QL STRIP: NEGATIVE
CLARITY UR: ABNORMAL
COLOR UR: YELLOW
GLUCOSE UR STRIP-MCNC: NEGATIVE MG/DL
HGB UR QL STRIP.AUTO: NEGATIVE
HYALINE CASTS #/AREA URNS LPF: ABNORMAL /LPF
KETONES UR STRIP-MCNC: NEGATIVE MG/DL
LEUKOCYTE ESTERASE UR QL STRIP: ABNORMAL
NITRITE UR QL STRIP: NEGATIVE
NON-SQ EPI CELLS URNS QL MICRO: ABNORMAL /HPF
PH UR STRIP.AUTO: 5.5 [PH]
PROT UR STRIP-MCNC: ABNORMAL MG/DL
RBC #/AREA URNS AUTO: ABNORMAL /HPF
SP GR UR STRIP.AUTO: 1.02 (ref 1–1.03)
UROBILINOGEN UR QL STRIP.AUTO: 0.2 E.U./DL
WBC #/AREA URNS AUTO: ABNORMAL /HPF

## 2020-06-04 PROCEDURE — 87186 SC STD MICRODIL/AGAR DIL: CPT

## 2020-06-04 PROCEDURE — 81001 URINALYSIS AUTO W/SCOPE: CPT | Performed by: NURSE PRACTITIONER

## 2020-06-04 PROCEDURE — 87077 CULTURE AEROBIC IDENTIFY: CPT

## 2020-06-04 PROCEDURE — 87086 URINE CULTURE/COLONY COUNT: CPT

## 2020-06-05 ENCOUNTER — TELEPHONE (OUTPATIENT)
Dept: INTERNAL MEDICINE CLINIC | Age: 70
End: 2020-06-05

## 2020-06-06 LAB — BACTERIA UR CULT: ABNORMAL

## 2020-07-24 DIAGNOSIS — F41.9 ANXIETY: ICD-10-CM

## 2020-07-24 RX ORDER — ALPRAZOLAM 1 MG/1
1 TABLET ORAL 2 TIMES DAILY PRN
Qty: 60 TABLET | Refills: 1 | OUTPATIENT
Start: 2020-07-24

## 2020-07-24 NOTE — TELEPHONE ENCOUNTER
Patient needs Prescription for Xanax to go to [facility]

## 2020-08-04 NOTE — TELEPHONE ENCOUNTER
Patient spouse looking for prescription Xanax to [facility]

## 2020-08-06 NOTE — TELEPHONE ENCOUNTER
called again regarding the script for Xanax  He stated they have been waiting for over a week  Please call with an update

## 2020-08-06 NOTE — TELEPHONE ENCOUNTER
Pt is in need of a f/u appt in order to have refill completed  Per Dr Norma Shaffer, pt has not been seen since May 2019

## 2020-08-10 NOTE — TELEPHONE ENCOUNTER
FYANA MARÍA  I called patient but  answered the phone  I tried to schedule a follow up appt but patient's  stated he will call back later when they have their calendar avaiable  I confirmed that Rx wont be fill until patient follow up with her PCP

## 2020-08-10 NOTE — TELEPHONE ENCOUNTER
Needs to be seen  If they have video capability I can do a telemedicine visit since she is wheelchair-bound primarily but if no video capabilities and she should be seen in the office

## 2020-08-11 DIAGNOSIS — F41.9 ANXIETY: ICD-10-CM

## 2020-08-11 RX ORDER — ALPRAZOLAM 1 MG/1
1 TABLET ORAL 2 TIMES DAILY PRN
Qty: 60 TABLET | Refills: 1 | Status: SHIPPED | OUTPATIENT
Start: 2020-08-11 | End: 2021-07-27 | Stop reason: SDUPTHER

## 2020-09-22 ENCOUNTER — OFFICE VISIT (OUTPATIENT)
Dept: INTERNAL MEDICINE CLINIC | Age: 70
End: 2020-09-22
Payer: MEDICARE

## 2020-09-22 VITALS
HEART RATE: 82 BPM | TEMPERATURE: 98.2 F | SYSTOLIC BLOOD PRESSURE: 100 MMHG | OXYGEN SATURATION: 98 % | DIASTOLIC BLOOD PRESSURE: 62 MMHG

## 2020-09-22 DIAGNOSIS — Z79.899 MEDICAL MARIJUANA USE: ICD-10-CM

## 2020-09-22 DIAGNOSIS — N30.00 ACUTE CYSTITIS WITHOUT HEMATURIA: ICD-10-CM

## 2020-09-22 DIAGNOSIS — F41.9 ANXIETY: ICD-10-CM

## 2020-09-22 DIAGNOSIS — Z99.3 WHEELCHAIR BOUND: ICD-10-CM

## 2020-09-22 DIAGNOSIS — E55.9 VITAMIN D DEFICIENCY: ICD-10-CM

## 2020-09-22 DIAGNOSIS — Z12.39 SCREENING FOR MALIGNANT NEOPLASM OF BREAST: Primary | ICD-10-CM

## 2020-09-22 DIAGNOSIS — K59.03 DRUG-INDUCED CONSTIPATION: ICD-10-CM

## 2020-09-22 DIAGNOSIS — N31.9 NEUROGENIC BLADDER: ICD-10-CM

## 2020-09-22 DIAGNOSIS — Z00.00 MEDICARE ANNUAL WELLNESS VISIT, SUBSEQUENT: ICD-10-CM

## 2020-09-22 DIAGNOSIS — G35 MULTIPLE SCLEROSIS (HCC): ICD-10-CM

## 2020-09-22 DIAGNOSIS — R73.03 PREDIABETES: ICD-10-CM

## 2020-09-22 DIAGNOSIS — Z12.31 ENCOUNTER FOR SCREENING MAMMOGRAM FOR MALIGNANT NEOPLASM OF BREAST: ICD-10-CM

## 2020-09-22 DIAGNOSIS — R73.01 IMPAIRED FASTING GLUCOSE: ICD-10-CM

## 2020-09-22 DIAGNOSIS — Z13.220 SCREENING CHOLESTEROL LEVEL: ICD-10-CM

## 2020-09-22 DIAGNOSIS — G89.4 CHRONIC PAIN SYNDROME: ICD-10-CM

## 2020-09-22 DIAGNOSIS — Z02.82 ADOPTED: ICD-10-CM

## 2020-09-22 PROCEDURE — 99214 OFFICE O/P EST MOD 30 MIN: CPT | Performed by: FAMILY MEDICINE

## 2020-09-22 PROCEDURE — G0439 PPPS, SUBSEQ VISIT: HCPCS | Performed by: FAMILY MEDICINE

## 2020-09-22 RX ORDER — PHENAZOPYRIDINE HYDROCHLORIDE 200 MG/1
200 TABLET, FILM COATED ORAL
Qty: 9 TABLET | Refills: 0 | Status: SHIPPED | OUTPATIENT
Start: 2020-09-22 | End: 2021-08-03

## 2020-09-22 RX ORDER — PAROXETINE HYDROCHLORIDE 40 MG/1
40 TABLET, FILM COATED ORAL DAILY
Qty: 90 TABLET | Refills: 1 | Status: SHIPPED | OUTPATIENT
Start: 2020-09-22 | End: 2022-07-06

## 2020-09-22 NOTE — PATIENT INSTRUCTIONS
Medicare Preventive Visit Patient Instructions  Thank you for completing your Welcome to Medicare Visit or Medicare Annual Wellness Visit today  Your next wellness visit will be due in one year (9/22/2021)  The screening/preventive services that you may require over the next 5-10 years are detailed below  Some tests may not apply to you based off risk factors and/or age  Screening tests ordered at today's visit but not completed yet may show as past due  Also, please note that scanned in results may not display below  Preventive Screenings:  Service Recommendations Previous Testing/Comments   Colorectal Cancer Screening  * Colonoscopy    * Fecal Occult Blood Test (FOBT)/Fecal Immunochemical Test (FIT)  * Fecal DNA/Cologuard Test  * Flexible Sigmoidoscopy Age: 54-65 years old   Colonoscopy: every 10 years (may be performed more frequently if at higher risk)  OR  FOBT/FIT: every 1 year  OR  Cologuard: every 3 years  OR  Sigmoidoscopy: every 5 years  Screening may be recommended earlier than age 48 if at higher risk for colorectal cancer  Also, an individualized decision between you and your healthcare provider will decide whether screening between the ages of 74-80 would be appropriate  Colonoscopy: Not on file  FOBT/FIT: Not on file  Cologuard: Not on file  Sigmoidoscopy: Not on file         Breast Cancer Screening Age: 36 years old  Frequency: every 1-2 years  Not required if history of left and right mastectomy Mammogram: Not on file       Cervical Cancer Screening Between the ages of 21-29, pap smear recommended once every 3 years  Between the ages of 33-67, can perform pap smear with HPV co-testing every 5 years     Recommendations may differ for women with a history of total hysterectomy, cervical cancer, or abnormal pap smears in past  Pap Smear: Not on file    Screening Not Indicated   Hepatitis C Screening Once for adults born between [facility]  More frequently in patients at high risk for Hepatitis C Hep C Antibody: 12/21/2018    Screening Current   Diabetes Screening 1-2 times per year if you're at risk for diabetes or have pre-diabetes Fasting glucose: 94 mg/dL   A1C: No results in last 5 years       Cholesterol Screening Once every 5 years if you don't have a lipid disorder  May order more often based on risk factors  Lipid panel: 12/21/2018    Screening Current     Other Preventive Screenings Covered by Medicare:  1  Abdominal Aortic Aneurysm (AAA) Screening: covered once if your at risk  You're considered to be at risk if you have a family history of AAA  2  Lung Cancer Screening: covers low dose CT scan once per year if you meet all of the following conditions: (1) Age 50-69; (2) No signs or symptoms of lung cancer; (3) Current smoker or have quit smoking within the last 15 years; (4) You have a tobacco smoking history of at least 30 pack years (packs per day multiplied by number of years you smoked); (5) You get a written order from a healthcare provider  3  Glaucoma Screening: covered annually if you're considered high risk: (1) You have diabetes OR (2) Family history of glaucoma OR (3)  aged 48 and older OR (3)  American aged 72 and older  3  Osteoporosis Screening: covered every 2 years if you meet one of the following conditions: (1) You're estrogen deficient and at risk for osteoporosis based off medical history and other findings; (2) Have a vertebral abnormality; (3) On glucocorticoid therapy for more than 3 months; (4) Have primary hyperparathyroidism; (5) On osteoporosis medications and need to assess response to drug therapy  · Last bone density test (DXA Scan): Not on file  5  HIV Screening: covered annually if you're between the age of 12-76  Also covered annually if you are younger than 13 and older than 72 with risk factors for HIV infection  For pregnant patients, it is covered up to 3 times per pregnancy      Immunizations:  Immunization Recommendations Influenza Vaccine Annual influenza vaccination during flu season is recommended for all persons aged >= 6 months who do not have contraindications   Pneumococcal Vaccine (Prevnar and Pneumovax)  * Prevnar = PCV13  * Pneumovax = PPSV23   Adults 25-60 years old: 1-3 doses may be recommended based on certain risk factors  Adults 72 years old: Prevnar (PCV13) vaccine recommended followed by Pneumovax (PPSV23) vaccine  If already received PPSV23 since turning 65, then PCV13 recommended at least one year after PPSV23 dose  Hepatitis B Vaccine 3 dose series if at intermediate or high risk (ex: diabetes, end stage renal disease, liver disease)   Tetanus (Td) Vaccine - COST NOT COVERED BY MEDICARE PART B Following completion of primary series, a booster dose should be given every 10 years to maintain immunity against tetanus  Td may also be given as tetanus wound prophylaxis  Tdap Vaccine - COST NOT COVERED BY MEDICARE PART B Recommended at least once for all adults  For pregnant patients, recommended with each pregnancy  Shingles Vaccine (Shingrix) - COST NOT COVERED BY MEDICARE PART B  2 shot series recommended in those aged 48 and above     Health Maintenance Due:      Topic Date Due    MAMMOGRAM  1950    Hepatitis C Screening  Completed     Immunizations Due:      Topic Date Due    DTaP,Tdap,and Td Vaccines (1 - Tdap) 10/14/1970    Influenza Vaccine  07/01/2020     Advance Directives   What are advance directives? Advance directives are legal documents that state your wishes and plans for medical care  These plans are made ahead of time in case you lose your ability to make decisions for yourself  Advance directives can apply to any medical decision, such as the treatments you want, and if you want to donate organs  What are the types of advance directives? There are many types of advance directives, and each state has rules about how to use them   You may choose a combination of any of the following:  · Living will: This is a written record of the treatment you want  You can also choose which treatments you do not want, which to limit, and which to stop at a certain time  This includes surgery, medicine, IV fluid, and tube feedings  · Durable power of  for healthcare [facility]): This is a written record that states who you want to make healthcare choices for you when you are unable to make them for yourself  This person, called a proxy, is usually a family member or a friend  You may choose more than 1 proxy  · Do not resuscitate (DNR) order:  A DNR order is used in case your heart stops beating or you stop breathing  It is a request not to have certain forms of treatment, such as CPR  A DNR order may be included in other types of advance directives  · Medical directive: This covers the care that you want if you are in a coma, near death, or unable to make decisions for yourself  You can list the treatments you want for each condition  Treatment may include pain medicine, surgery, blood transfusions, dialysis, IV or tube feedings, and a ventilator (breathing machine)  · Values history: This document has questions about your views, beliefs, and how you feel and think about life  This information can help others choose the care that you would choose  Why are advance directives important? An advance directive helps you control your care  Although spoken wishes may be used, it is better to have your wishes written down  Spoken wishes can be misunderstood, or not followed  Treatments may be given even if you do not want them  An advance directive may make it easier for your family to make difficult choices about your care  © Copyright [employer] 2018 Information is for End User's use only and may not be sold, redistributed or otherwise used for commercial purposes   All illustrations and images included in CareNotes® are the copyrighted property of A [employer] M [employer]  or [employer]

## 2020-09-22 NOTE — PROGRESS NOTES
Assessment and Plan:     Problem List Items Addressed This Visit        Digestive    Drug-induced constipation       Endocrine    Impaired fasting glucose    Relevant Orders    Hemoglobin A1C    Comprehensive metabolic panel       Nervous and Auditory    Multiple sclerosis (HCC)    Relevant Orders    CBC and differential    TSH, 3rd generation       Genitourinary    Acute cystitis without hematuria    Relevant Medications    phenazopyridine (PYRIDIUM) 200 mg tablet    Other Relevant Orders    UA w Reflex to Microscopic w Reflex to Culture -Lab Collect       Other    Vitamin D deficiency    Relevant Orders    Vitamin D 25 hydroxy    Chronic pain    Adopted    Wheelchair bound    Medicare annual wellness visit, subsequent    Neurogenic bladder    Relevant Medications    phenazopyridine (PYRIDIUM) 200 mg tablet    Other Relevant Orders    UA w Reflex to Microscopic w Reflex to Culture -Lab Collect    Medical marijuana use      Other Visit Diagnoses     Screening for malignant neoplasm of breast    -  Primary    Relevant Orders    Mammo screening bilateral w cad    Screening cholesterol level        Relevant Orders    Lipid panel    Encounter for screening mammogram for malignant neoplasm of breast         Relevant Orders    Mammo screening bilateral w cad    Prediabetes         Relevant Orders    TSH, 3rd generation    Anxiety        Relevant Medications    PARoxetine (PAXIL) 40 MG tablet            Recommend home rehab and PT which she she is agreeable to  Check lab work and we will set up home drop  Return 6 months for follow-up  Increase Paxil to 40 mg for better control on anxiety  Continue follow-up with neurology  She will get her flu shot through the [employer] colored drive-through in October  All questions and concerns addressed  Her  is here with her today  Discussed previous elevated cholesterol and blood sugar  Recommend mammogram colonoscopy and dexa scan          Preventive health issues were discussed with patient, and age appropriate screening tests were ordered as noted in patient's After Visit Summary  Personalized health advice and appropriate referrals for health education or preventive services given if needed, as noted in patient's After Visit Summary       History of Present Illness:     Patient presents for Medicare Annual Wellness visit    Patient Care Team:  Anthony Cruz DO as PCP - General (Family Medicine)     Problem List:     Patient Active Problem List   Diagnosis    Vitamin D deficiency    Drug-induced constipation    Chronic pain    Multiple sclerosis ([facility] [address] )    Adopted    Wheelchair bound   [employer] annual wellness visit, subsequent    Impaired fasting glucose    Acute cystitis without hematuria    Neurogenic bladder    Medical marijuana use      Past Medical and Surgical History:     Past Medical History:   Diagnosis Date    Acute cystitis     Anxiety     Arthritis     Cauda equina syndrome with neurogenic bladder ([facility] [address] [facility]     Disease of thyroid gland     Dysuria     Multiple sclerosis ([facility] [address] [facility]     Neurogenic bladder     Nocturia     Rheumatoid arthritis ([facility] [address] [facility]     UTI (urinary tract infection)      Past Surgical History:   Procedure Laterality Date    CYSTOSCOPY  2012    KNEE SURGERY        Family History:     Family History   Problem Relation Age of Onset    No Known Problems Mother     No Known Problems Father       Social History:        Social History     Socioeconomic History    Marital status: /Civil Union     Spouse name: None    Number of children: None    Years of education: None    Highest education level: None   Occupational History    None   Social Needs    Financial resource strain: None    Food insecurity     Worry: None     Inability: None    Transportation needs     Medical: None     Non-medical: None   Tobacco Use    Smoking status: Never Smoker    Smokeless tobacco: Never Used    Tobacco comment: Former smoker, per [employer] Substance and Sexual Activity    Alcohol use: No    Drug use: No    Sexual activity: None   Lifestyle    Physical activity     Days per week: None     Minutes per session: None    Stress: None   Relationships    Social connections     Talks on phone: None     Gets together: None     Attends Yarsani service: None     Active member of club or organization: None     Attends meetings of clubs or organizations: None     Relationship status: None    Intimate partner violence     Fear of current or ex partner: None     Emotionally abused: None     Physically abused: None     Forced sexual activity: None   Other Topics Concern    None   Social History Narrative    None      Medications and Allergies:     Current Outpatient Medications   Medication Sig Dispense Refill    alendronate (FOSAMAX) 70 mg tablet Take 1 tablet (70 mg total) by mouth every 7 days Take with full glass water, on empty stomach, do not eat or lay down for 30 min  12 tablet 3    ALPRAZolam (XANAX) 1 mg tablet Take 1 tablet (1 mg total) by mouth 2 (two) times a day as needed for anxiety 60 tablet 1    aspirin-dipyridamole (AGGRENOX)  mg per 12 hr capsule Take 1 capsule by mouth every 12 (twelve) hours      baclofen 10 mg tablet Take 1 tablet (10 mg total) by mouth 4 (four) times a day 120 tablet 5    buPROPion (WELLBUTRIN SR) 200 MG 12 hr tablet Take 1 tablet by mouth 2 (two) times a day        carBAMazepine (TEGretol XR) 200 mg 12 hr tablet Take 200 mg by mouth 2 (two) times a day        Cholecalciferol 2000 units CAPS Take 1 capsule by mouth      ergocalciferol (VITAMIN D2) 50,000 units Take 1 capsule (50,000 Units total) by mouth once a week 12 capsule 1    levothyroxine 200 mcg tablet TAKE 1 TABLET BY MOUTH  DAILY      LINZESS 290 MCG CAPS 1 capsule daily       methadone (DOLOPHINE) 10 mg tablet Take two tabs by mouth three times daily and three tabs at bedtime    Long term medication,      methylphenidate (RITALIN) 20 MG tablet Three tabs daily - Long term medication      morphine (MS CONTIN) 60 mg 12 hr tablet 2 tabs in am; 1 tab mid-morning; 1 tab mid afternoon; 2 tabs at bedtime  Long term medication      pantoprazole (PROTONIX) 40 mg tablet Take 1 tablet (40 mg total) by mouth daily 90 tablet 1    PARoxetine (PAXIL) 40 MG tablet Take 1 tablet (40 mg total) by mouth daily 90 tablet 1    pregabalin (LYRICA) 50 mg capsule Take 1 capsule (50 mg total) by mouth 2 (two) times a day 180 capsule 1    prochlorperazine (COMPAZINE) 25 mg suppository Insert 25 mg into the rectum daily as needed for nausea or vomiting      promethazine (PHENERGAN) 25 mg suppository Insert 1 suppository (25 mg total) into the rectum every 6 (six) hours as needed for nausea or vomiting 12 each 5    promethazine (PHENERGAN) 25 mg tablet Take 1 tablet (25 mg total) by mouth every 6 (six) hours as needed for nausea or vomiting 60 tablet 5    phenazopyridine (PYRIDIUM) 200 mg tablet Take 1 tablet (200 mg total) by mouth 3 (three) times a day with meals 9 tablet 0     No current facility-administered medications for this visit  Allergies   Allergen Reactions    Codeine      Other reaction(s): Other (See Comments)  can take morphine  Other reaction(s):  Other (See Comments)  can take morphine  Can take morphine    Penicillins       Immunizations:     Immunization History   Administered Date(s) Administered    INFLUENZA 12/16/2013    Influenza, high dose seasonal 0 7 mL 12/07/2018    Pneumococcal Conjugate 13-Valent 06/01/2017    Pneumococcal Polysaccharide PPV23 01/11/2019      Health Maintenance:         Topic Date Due    MAMMOGRAM  1950    Hepatitis C Screening  Completed         Topic Date Due    DTaP,Tdap,and Td Vaccines (1 - Tdap) 10/14/1970    Influenza Vaccine  07/01/2020      Medicare Health Risk Assessment:     /62 (BP Location: Left arm, Patient Position: Sitting, Cuff Size: Standard)   Pulse 82   Temp 98 2 °F (36 8 °C) (Temporal)   SpO2 98%      Jaiden Iniguez is here for her Initial Wellness visit  Last Medicare Wellness visit information reviewed, patient interviewed and updates made to the record today  Health Risk Assessment:   Patient rates overall health as fair  Patient feels that their physical health rating is same  Eyesight was rated as same  Hearing was rated as same  Patient feels that their emotional and mental health rating is same  Pain experienced in the last 7 days has been some  Patient's pain rating has been 5/10  Patient states that she has experienced no weight loss or gain in last 6 months  Depression Screening:   PHQ-2 Score: 0      Fall Risk Screening: In the past year, patient has experienced: no history of falling in past year      Urinary Incontinence Screening:   Patient has not leaked urine accidently in the last six months  Home Safety:  Patient has trouble with stairs inside or outside of their home  Patient has working smoke alarms and has working carbon monoxide detector  Home safety hazards include: none  Nutrition:   Current diet is Regular  Medications:   Patient is currently taking over-the-counter supplements  OTC medications include: see medication list  Patient is able to manage medications  Activities of Daily Living (ADLs)/Instrumental Activities of Daily Living (IADLs):   Walk and transfer into and out of bed and chair?: Yes  Dress and groom yourself?: Yes    Bathe or shower yourself?: Yes    Feed yourself?  Yes  Do your laundry/housekeeping?: Yes  Manage your money, pay your bills and track your expenses?: No  Make your own meals?: No    Do your own shopping?: No    Previous Hospitalizations:   Any hospitalizations or ED visits within the last 12 months?: No      Advance Care Planning:   Living will: Yes    Advanced directive: Yes      PREVENTIVE SCREENINGS      Cardiovascular Screening:    General: Screening Current      Cervical Cancer Screening:    General: Screening Not Indicated      Lung Cancer Screening:     General: Screening Not Indicated      Hepatitis C Screening:    General: Screening Current      Vitamin-D deficiency  Denies high fatty starchy diet level checked recently is at 19  She is not taking any over-the-counter medications  Two thousand nineteen, did not having lab work done  September 2020, taking weekly vitamin-D and has 7 pills left  Due for labs    Hypercholesterolemia, unknown family history from mom and dad since she is adopted however her biological sister has high cholesterol is being treated for  She is not sure where her level has been in the past   LDL is elevated as well as triglycerides     Opiate induced constipation, much better and requiring less opioids  Started medical marijuana and doing well     Neuropathy, started medical marijuana and neuropathy symptoms have significantly improved  Requiring less pain medication  Did well with increased dose of Lyrica to twice a day and would like to continue with this dose      Osteoporosis likely due to disuse  Had bone density study several years ago and has been on Fosamax for 2 years  Last vitamin-D level was in range  Due now  Multiple sclerosis, follows with neurology 4 times per year however this year has only been able to have tele visits due to corona virus restrictions  She states she can stand and pivot on her left leg but right leg is contracted and she is not able to put any weight on there  She admits to not doing much at home and is sitting around and sleeping most of the day and then has intermittent sleep at night  She does not do any exercise or home PT but would be agreeable to doing this  She is compliant with her medications that are largely prescribed by Neurology  Anxiety, patient on Wellbutrin and Paxil and takes Xanax 2 times per day due to anxiety    She has no crying spells or feelings sadness or melancholy and no depression and no suicidal homicidal ideations but states she feels worked up inside and needs to have better control of her anxiety      Constitutional:  Denies fever or chills   Eyes:  Denies double or blurry vision  HENT:  Denies nasal congestion or sore throat no allergy symptoms  Respiratory:  Denies cough or shortness of breath or wheezing  Cardiovascular:  Denies palpitations or chest pain  GI:  Denies abdominal pain, nausea, or vomiting  Integument:  Denies rash , no open areas  Neurologic:  Denies headache or focal weakness no dizziness      Constitutional:  Well developed, well nourished, no acute distress, non-toxic appearance   Eyes:  PERRL, conjunctiva normal , non icteric sclera  HENT:  Atraumatic, oropharynx moist  Neck-  supple   Respiratory:  CTA b/l, normal breath sounds, no rales, no wheezing   Cardiovascular:  RRR, no murmurs, no LE edema b/l  GI:  Soft, nondistended, normal bowel sounds x 4, nontender, no organomegaly, no mass, no rebound, no guarding   Neurologic:  no focal deficits noted   Psychiatric:  Speech and behavior appropriate , AAO x 3  Musculoskeletal, in wheelchair    The 10-year ASCVD risk score (Dulce Maria Pal et al , 2013) is: 6 3%    Values used to calculate the score:      Age: 78 years      Sex: Female      Is Non- : No      Diabetic: No      Tobacco smoker: No      Systolic Blood Pressure: 085 mmHg      Is BP treated: No      HDL Cholesterol: 55 mg/dL      Total Cholesterol: 237 mg/dL    Emilia Hensley DO

## 2020-09-23 ENCOUNTER — TELEPHONE (OUTPATIENT)
Dept: INTERNAL MEDICINE CLINIC | Facility: CLINIC | Age: 70
End: 2020-09-23

## 2020-09-23 NOTE — TELEPHONE ENCOUNTER
----- Message from Desi Hernandez DO sent at 9/22/2020  2:19 PM EDT -----  Please set up patient for home draw through [facility]  Tests are in her chart and ordered today  They are fasting an are expecting a phone call    Thank you so much

## 2020-10-01 ENCOUNTER — TELEPHONE (OUTPATIENT)
Dept: INTERNAL MEDICINE CLINIC | Age: 70
End: 2020-10-01

## 2020-10-15 ENCOUNTER — TELEPHONE (OUTPATIENT)
Dept: INTERNAL MEDICINE CLINIC | Facility: CLINIC | Age: 70
End: 2020-10-15

## 2020-10-15 DIAGNOSIS — M25.551 HIP PAIN, ACUTE, RIGHT: Primary | ICD-10-CM

## 2020-10-30 ENCOUNTER — TELEPHONE (OUTPATIENT)
Dept: INTERNAL MEDICINE CLINIC | Facility: CLINIC | Age: 70
End: 2020-10-30

## 2020-11-04 DIAGNOSIS — K21.9 GASTROESOPHAGEAL REFLUX DISEASE WITHOUT ESOPHAGITIS: ICD-10-CM

## 2020-11-04 RX ORDER — PANTOPRAZOLE SODIUM 40 MG/1
40 TABLET, DELAYED RELEASE ORAL DAILY
Qty: 90 TABLET | Refills: 1 | Status: SHIPPED | OUTPATIENT
Start: 2020-11-04 | End: 2022-01-17 | Stop reason: SDUPTHER

## 2020-11-23 LAB
CREAT ?TM UR-SCNC: 70.3 UMOL/L
EXT PROTEIN URINE: 14
HBA1C MFR BLD HPLC: 5.6 %
PROT/CREAT UR: 0.2 MG/G{CREAT}

## 2020-12-10 DIAGNOSIS — F41.9 ANXIETY: ICD-10-CM

## 2020-12-10 RX ORDER — ALPRAZOLAM 1 MG/1
1 TABLET ORAL 2 TIMES DAILY PRN
Qty: 60 TABLET | Refills: 0 | OUTPATIENT
Start: 2020-12-10

## 2020-12-11 DIAGNOSIS — F41.9 ANXIETY: ICD-10-CM

## 2020-12-14 RX ORDER — ALPRAZOLAM 1 MG/1
1 TABLET ORAL 2 TIMES DAILY PRN
Qty: 60 TABLET | Refills: 0 | OUTPATIENT
Start: 2020-12-14

## 2020-12-18 ENCOUNTER — TELEPHONE (OUTPATIENT)
Dept: INTERNAL MEDICINE CLINIC | Facility: CLINIC | Age: 70
End: 2020-12-18

## 2020-12-19 ENCOUNTER — NURSE TRIAGE (OUTPATIENT)
Dept: OTHER | Facility: OTHER | Age: 70
End: 2020-12-19

## 2020-12-19 DIAGNOSIS — R35.0 INCREASED URINARY FREQUENCY: Primary | ICD-10-CM

## 2020-12-19 RX ORDER — NITROFURANTOIN 25; 75 MG/1; MG/1
100 CAPSULE ORAL 2 TIMES DAILY
Qty: 10 CAPSULE | Refills: 0 | Status: SHIPPED | OUTPATIENT
Start: 2020-12-19 | End: 2020-12-24

## 2020-12-29 DIAGNOSIS — F41.9 ANXIETY: ICD-10-CM

## 2021-01-05 RX ORDER — ALPRAZOLAM 1 MG/1
1 TABLET ORAL 2 TIMES DAILY PRN
Qty: 60 TABLET | Refills: 0 | OUTPATIENT
Start: 2021-01-05

## 2021-01-25 NOTE — TELEPHONE ENCOUNTER
Patient is on antibiotics treatment for urinary tract infection and symptoms are worsening  She stated she might need to change antibiotic  Would like to speak with the Doctor  SUBJECTIVE:  Wendie Kehr   2018   female   No Known Allergies    Chief Complaint   Patient presents with    Rash        Patient Active Problem List    Diagnosis Date Noted    Eczema 04/12/2018       HPI   Pt is brought in today with a few week hx of itching and intermittent rash? Hives. Mom reports sx started a few weeks ago after eating fish sticks. No hx of new foods, medications, change in skin products or detergents. No pets in home. No one else with a rash. No URI sx, fever, chills. She has been scratching . Dad reports rash/ hives do not usually stay around for very long. They have been giving her Benadryl which helps with itching. No cough, wheezing or SOB. No past medical history on file.   Social History     Socioeconomic History    Marital status: Single     Spouse name: Not on file    Number of children: Not on file    Years of education: Not on file    Highest education level: Not on file   Occupational History    Occupation: student   Social Needs    Financial resource strain: Not on file    Food insecurity     Worry: Not on file     Inability: Not on file   [employer] needs     Medical: Not on file     Non-medical: Not on file   Tobacco Use    Smoking status: Never Smoker    Smokeless tobacco: Never Used   Substance and Sexual Activity    Alcohol use: No    Drug use: No    Sexual activity: Not on file   Lifestyle    Physical activity     Days per week: Not on file     Minutes per session: Not on file    Stress: Not on file   Relationships    Social connections     Talks on phone: Not on file     Gets together: Not on file     Attends [employer] service: Not on file     Active member of club or organization: Not on file     Attends meetings of clubs or organizations: Not on file     Relationship status: Not on file    Intimate partner violence     Fear of current or ex partner: Not on file     Emotionally abused: Not on file     Physically abused: Not on file     Forced sexual activity: Not on file   Other Topics Concern    Not on file   Social History Narrative    Not on file     Family History   Problem Relation Age of Onset    No Known Problems Mother        Review of Systems   Constitutional: Negative for activity change, appetite change, fever and irritability. HENT: Negative for congestion and rhinorrhea. Eyes: Negative for redness. Respiratory: Negative for cough and wheezing. Cardiovascular: Negative for chest pain. Gastrointestinal: Negative for abdominal pain. Skin: Positive for rash. Intermittent itching and redness   Psychiatric/Behavioral: Negative for agitation and sleep disturbance. OBJECTIVE:  Pulse 88   Temp 97.1 °F (36.2 °C) (Temporal)   Resp (!) 98   Ht 42\" (106.7 cm)   Wt 40 lb 12.8 oz (18.5 kg)   BMI 16.26 kg/m²   Physical Exam  Vitals signs and nursing note reviewed. Constitutional:       Appearance: Normal appearance. She is well-developed. HENT:      Head: Normocephalic. Right Ear: Tympanic membrane normal.      Left Ear: Tympanic membrane normal.      Mouth/Throat:      Mouth: Mucous membranes are moist.   Eyes:      Extraocular Movements: Extraocular movements intact. Pupils: Pupils are equal, round, and reactive to light. Neck:      Musculoskeletal: Normal range of motion. Cardiovascular:      Rate and Rhythm: Normal rate and regular rhythm. Heart sounds: Normal heart sounds. Pulmonary:      Effort: Pulmonary effort is normal.      Breath sounds: Normal breath sounds. Abdominal:      General: Bowel sounds are normal.      Palpations: Abdomen is soft. Lymphadenopathy:      Cervical: No cervical adenopathy. Skin:     General: Skin is warm and dry. Comments: There is an area of red, raised skin where pt was scratching back of her neck ( no rash there prior to scratching)   Neurological:      Mental Status: She is alert. ASSESSMENT/PLAN:    1. Pruritic disorder  Zyrtec in am    2.

## 2021-02-26 ENCOUNTER — TELEMEDICINE (OUTPATIENT)
Dept: INTERNAL MEDICINE CLINIC | Facility: CLINIC | Age: 71
End: 2021-02-26
Payer: MEDICARE

## 2021-02-26 DIAGNOSIS — Z12.31 ENCOUNTER FOR MAMMOGRAM TO ESTABLISH BASELINE MAMMOGRAM: ICD-10-CM

## 2021-02-26 DIAGNOSIS — Z12.31 ENCOUNTER FOR SCREENING MAMMOGRAM FOR MALIGNANT NEOPLASM OF BREAST: ICD-10-CM

## 2021-02-26 DIAGNOSIS — K64.9 HEMORRHOIDS, UNSPECIFIED HEMORRHOID TYPE: ICD-10-CM

## 2021-02-26 DIAGNOSIS — Z12.39 ENCOUNTER FOR SCREENING FOR MALIGNANT NEOPLASM OF BREAST, UNSPECIFIED SCREENING MODALITY: Primary | ICD-10-CM

## 2021-02-26 DIAGNOSIS — B34.9 VIRAL INFECTION, UNSPECIFIED: ICD-10-CM

## 2021-02-26 DIAGNOSIS — Z12.11 ENCOUNTER FOR SCREENING COLONOSCOPY: ICD-10-CM

## 2021-02-26 PROCEDURE — 99213 OFFICE O/P EST LOW 20 MIN: CPT | Performed by: NURSE PRACTITIONER

## 2021-02-26 NOTE — ASSESSMENT & PLAN NOTE
Advised patient that she should isolate at home, until test results are obtained  Stay home except to get medical care  Separate yourself from other people and animals in your home  Call ahead before visiting your doctor  Wear a facemask  Cover your coughs and sneezes  Clean your hands often  Avoid sharing personal household items  Clean all high-touch surfaces everyday  Monitor your symptoms  Seek prompt medical attention if your illness is worsening (e g , difficulty breathing)  Before seeking care, call your healthcare provider and tell them that you have, or are being evaluated for, COVID-19  Put on a facemask before you enter the facility  These steps will help the healthcare providers office to keep other people in the office or waiting room from getting infected or exposed  Ask your healthcare provider to call the local or state health department  Persons who are placed under active monitoring or facilitated self-monitoring should follow instructions provided by their local health department or occupational health professionals, as appropriate  If you have a medical emergency and need to call 911, notify the dispatch personnel that you have, or are being evaluated for COVID-19  If possible, put on a facemask before emergency medical services arrive

## 2021-02-26 NOTE — PROGRESS NOTES
COVID-19 Virtual Visit     Assessment/Plan:    Problem List Items Addressed This Visit        Other    Viral infection, unspecified     Advised patient that she should isolate at home, until test results are obtained  Stay home except to get medical care  Separate yourself from other people and animals in your home  Call ahead before visiting your doctor  Wear a facemask  Cover your coughs and sneezes  Clean your hands often  Avoid sharing personal household items  Clean all high-touch surfaces everyday  Monitor your symptoms  Seek prompt medical attention if your illness is worsening (e g , difficulty breathing)  Before seeking care, call your healthcare provider and tell them that you have, or are being evaluated for, COVID-19  Put on a facemask before you enter the facility  These steps will help the healthcare providers office to keep other people in the office or waiting room from getting infected or exposed  Ask your healthcare provider to call the local or [facility] health department  Persons who are placed under active monitoring or facilitated self-monitoring should follow instructions provided by their local health department or occupational health professionals, as appropriate  If you have a medical emergency and need to call 911, notify the dispatch personnel that you have, or are being evaluated for COVID-19  If possible, put on a facemask before emergency medical services arrive             Relevant Orders    Novel Coronavirus (Covid-19),PCR [facility] - Collected at [facility] or [facility]      Other Visit Diagnoses     Encounter for screening for malignant neoplasm of breast, unspecified screening modality    -  Primary    Encounter for screening mammogram for malignant neoplasm of breast        Encounter for screening colonoscopy        Relevant Orders    Ambulatory referral for colonoscopy    Hemorrhoids, unspecified hemorrhoid type        Relevant Orders    Ambulatory referral for colonoscopy Encounter for mammogram to establish baseline mammogram             Disposition:     I referred patient to one of our centralized sites for a COVID-19 swab  I have spent 15 minutes directly with the patient  Encounter provider JASON Hendrickson    Provider located at [address] [address]    Recent Visits  No visits were found meeting these conditions  Showing recent visits within past 7 days and meeting all other requirements     Today's Visits  Date Type Provider Dept   02/26/21 Krista Dubois [address]  [facility]   Showing today's visits and meeting all other requirements     Future Appointments  No visits were found meeting these conditions  Showing future appointments within next 150 days and meeting all other requirements        Patient agrees to participate in a virtual check in via telephone or video visit instead of presenting to the office to address urgent/immediate medical needs  Patient is aware this is a billable service  After connecting through Telephone, the patient was identified by name and date of birth  Doug Amezcua was informed that this was a telemedicine visit and that the exam was being conducted confidentially over secure lines  Doug Amezcua acknowledged consent and understanding of privacy and security of the telemedicine visit  I informed the patient that I have reviewed her record in Epic and presented the opportunity for her to ask any questions regarding the visit today  The patient agreed to participate  Subjective:   Doug Amezcua is a 69 y o  female who is concerned about COVID-19  Patient's symptoms include fatigue, nasal congestion, sore throat, anosmia, loss of taste, nausea, diarrhea and headache   Patient denies fever, chills, malaise, rhinorrhea, cough, shortness of breath, chest tightness, abdominal pain, vomiting and myalgias  Exposure:   Contact with a person who is under investigation (PUI) for or who is positive for COVID-19 within the last 14 days?: Yes    Hospitalized recently for fever and/or lower respiratory symptoms?: No      Currently a healthcare worker that is involved in direct patient care?: No      Works in a special setting where the risk of COVID-19 transmission may be high? (this may include long-term care, correctional and snf facilities; homeless shelters; assisted-living facilities and group homes ): No      Resident in a special setting where the risk of COVID-19 transmission may be high? (this may include long-term care, correctional and snf facilities; homeless shelters; assisted-living facilities and group homes ): No      Denies known exposure to [place],  is currently sick  Patient reports that she has been having symptoms for a couple of week, start of symptoms unknown as per patient  No results found for: Raquel Balderas, KEVIN, [address]  Past Medical History:   Diagnosis Date    Acute cystitis     Anxiety     Arthritis     Cauda equina syndrome with neurogenic bladder ([address] )     Disease of thyroid gland     Dysuria     Multiple sclerosis ([address] )     Neurogenic bladder     Nocturia     Rheumatoid arthritis ([address] )     UTI (urinary tract infection)      Past Surgical History:   Procedure Laterality Date    CYSTOSCOPY  2012    KNEE SURGERY       Current Outpatient Medications   Medication Sig Dispense Refill    alendronate (FOSAMAX) 70 mg tablet Take 1 tablet (70 mg total) by mouth every 7 days Take with full glass water, on empty stomach, do not eat or lay down for 30 min   12 tablet 3    ALPRAZolam (XANAX) 1 mg tablet Take 1 tablet (1 mg total) by mouth 2 (two) times a day as needed for anxiety 60 tablet 1    aspirin-dipyridamole (AGGRENOX)  mg per 12 hr capsule Take 1 capsule by mouth every 12 (twelve) hours      baclofen 10 mg tablet Take 1 tablet (10 mg total) by mouth 4 (four) times a day 120 tablet 5    buPROPion (WELLBUTRIN SR) 200 MG 12 hr tablet Take 1 tablet by mouth 2 (two) times a day        carBAMazepine (TEGretol XR) 200 mg 12 hr tablet Take 200 mg by mouth 2 (two) times a day        Cholecalciferol 2000 units CAPS Take 1 capsule by mouth      ergocalciferol (VITAMIN D2) 50,000 units Take 1 capsule (50,000 Units total) by mouth once a week 12 capsule 1    levothyroxine 200 mcg tablet TAKE 1 TABLET BY MOUTH  DAILY      LINZESS 290 MCG CAPS 1 capsule daily       methadone (DOLOPHINE) 10 mg tablet Take two tabs by mouth three times daily and three tabs at bedtime  Long term medication,      methylphenidate (RITALIN) 20 MG tablet Three tabs daily - Long term medication      morphine (MS CONTIN) 60 mg 12 hr tablet 2 tabs in am; 1 tab mid-morning; 1 tab mid afternoon; 2 tabs at bedtime  Long term medication      pantoprazole (PROTONIX) 40 mg tablet Take 1 tablet (40 mg total) by mouth daily 90 tablet 1    PARoxetine (PAXIL) 40 MG tablet Take 1 tablet (40 mg total) by mouth daily 90 tablet 1    pregabalin (LYRICA) 50 mg capsule Take 1 capsule (50 mg total) by mouth 2 (two) times a day 180 capsule 1    prochlorperazine (COMPAZINE) 25 mg suppository Insert 25 mg into the rectum daily as needed for nausea or vomiting      promethazine (PHENERGAN) 25 mg suppository Insert 1 suppository (25 mg total) into the rectum every 6 (six) hours as needed for nausea or vomiting 12 each 5    promethazine (PHENERGAN) 25 mg tablet Take 1 tablet (25 mg total) by mouth every 6 (six) hours as needed for nausea or vomiting 60 tablet 5    phenazopyridine (PYRIDIUM) 200 mg tablet Take 1 tablet (200 mg total) by mouth 3 (three) times a day with meals (Patient not taking: Reported on 2/26/2021) 9 tablet 0     No current facility-administered medications for this visit        Allergies   Allergen Reactions  Codeine      Other reaction(s): Other (See Comments)  can take morphine  Other reaction(s): Other (See Comments)  can take morphine  Can take morphine    Penicillins        Review of Systems   Constitutional: Positive for appetite change (decreased) and fatigue  Negative for activity change, chills, diaphoresis and fever  HENT: Positive for congestion and sore throat  Negative for ear discharge, ear pain, postnasal drip, rhinorrhea, sinus pressure and sinus pain  Eyes: Negative for pain, discharge, itching and visual disturbance  Respiratory: Negative for cough, chest tightness, shortness of breath and wheezing  Cardiovascular: Negative for chest pain, palpitations and leg swelling  Gastrointestinal: Positive for diarrhea and nausea  Negative for abdominal pain, blood in stool, constipation and vomiting  Endocrine: Negative for polydipsia, polyphagia and polyuria  Genitourinary: Negative for difficulty urinating, dysuria, hematuria and urgency  Musculoskeletal: Negative for arthralgias, back pain, myalgias and neck pain  Skin: Negative for rash and wound  Neurological: Positive for headaches  Negative for dizziness, weakness and numbness  Objective: There were no vitals filed for this visit  Physical Exam  Constitutional:       Appearance: Normal appearance  She is not ill-appearing  Eyes:      General: No scleral icterus  Pulmonary:      Effort: No respiratory distress  Neurological:      Mental Status: She is alert  Psychiatric:         Mood and Affect: Mood normal          Behavior: Behavior normal        VIRTUAL VISIT DISCLAIMER    Alicia Martinez acknowledges that she has consented to an online visit or consultation   She understands that the online visit is based solely on information provided by her, and that, in the absence of a face-to-face physical evaluation by the physician, the diagnosis she receives is both limited and provisional in terms of accuracy and completeness  This is not intended to replace a full medical face-to-face evaluation by the physician  Merlene Galvin understands and accepts these terms

## 2021-03-04 ENCOUNTER — IMMUNIZATIONS (OUTPATIENT)
Dept: FAMILY MEDICINE CLINIC | Facility: HOSPITAL | Age: 71
End: 2021-03-04

## 2021-03-04 DIAGNOSIS — Z23 ENCOUNTER FOR IMMUNIZATION: Primary | ICD-10-CM

## 2021-03-04 PROCEDURE — 0001A SARS-COV-2 / COVID-19 MRNA VACCINE (PFIZER-BIONTECH) 30 MCG: CPT

## 2021-03-04 PROCEDURE — 91300 SARS-COV-2 / COVID-19 MRNA VACCINE (PFIZER-BIONTECH) 30 MCG: CPT

## 2021-03-25 ENCOUNTER — IMMUNIZATIONS (OUTPATIENT)
Dept: FAMILY MEDICINE CLINIC | Facility: HOSPITAL | Age: 71
End: 2021-03-25

## 2021-03-25 DIAGNOSIS — Z23 ENCOUNTER FOR IMMUNIZATION: Primary | ICD-10-CM

## 2021-03-25 PROCEDURE — 0002A SARS-COV-2 / COVID-19 MRNA VACCINE (PFIZER-BIONTECH) 30 MCG: CPT

## 2021-03-25 PROCEDURE — 91300 SARS-COV-2 / COVID-19 MRNA VACCINE (PFIZER-BIONTECH) 30 MCG: CPT

## 2021-03-26 ENCOUNTER — TELEPHONE (OUTPATIENT)
Dept: INTERNAL MEDICINE CLINIC | Facility: CLINIC | Age: 71
End: 2021-03-26

## 2021-03-26 NOTE — TELEPHONE ENCOUNTER
Spoke to Antonette Milner and asked him to have patient call [facility] office  Pt would need new Cologuard kit as one she has would be     Pt needs new kit ordered and needs to return within a week of receipt

## 2021-04-02 DIAGNOSIS — E55.9 VITAMIN D DEFICIENCY: ICD-10-CM

## 2021-04-02 RX ORDER — ERGOCALCIFEROL 1.25 MG/1
50000 CAPSULE ORAL WEEKLY
Qty: 12 CAPSULE | Refills: 1 | Status: SHIPPED | OUTPATIENT
Start: 2021-04-02 | End: 2021-10-15 | Stop reason: SDUPTHER

## 2021-04-12 ENCOUNTER — TELEPHONE (OUTPATIENT)
Dept: INTERNAL MEDICINE CLINIC | Facility: CLINIC | Age: 71
End: 2021-04-12

## 2021-05-03 ENCOUNTER — TELEPHONE (OUTPATIENT)
Dept: INTERNAL MEDICINE CLINIC | Age: 71
End: 2021-05-03

## 2021-05-03 NOTE — TELEPHONE ENCOUNTER
LMOM for pt  To call back to schedule mammogram     Please assist pt   To schedule mammogram     Thank you

## 2021-07-23 ENCOUNTER — PREP FOR PROCEDURE (OUTPATIENT)
Dept: GASTROENTEROLOGY | Facility: CLINIC | Age: 71
End: 2021-07-23

## 2021-07-23 ENCOUNTER — TELEPHONE (OUTPATIENT)
Dept: GASTROENTEROLOGY | Facility: CLINIC | Age: 71
End: 2021-07-23

## 2021-07-23 DIAGNOSIS — Z12.11 SPECIAL SCREENING FOR MALIGNANT NEOPLASMS, COLON: Primary | ICD-10-CM

## 2021-07-23 NOTE — TELEPHONE ENCOUNTER
TC from pt's , Richa Torres, to scheduled screening colon  Answered no to all screening questions  Colon scheduled for Wednesday, 9/15/21, at [facility] with Dr Tobias Wang  Dulcolax/Miralax prep instructions mailed to pt's home

## 2021-07-27 ENCOUNTER — OFFICE VISIT (OUTPATIENT)
Dept: INTERNAL MEDICINE CLINIC | Facility: CLINIC | Age: 71
End: 2021-07-27
Payer: MEDICARE

## 2021-07-27 VITALS
HEART RATE: 83 BPM | OXYGEN SATURATION: 98 % | SYSTOLIC BLOOD PRESSURE: 130 MMHG | TEMPERATURE: 99.9 F | DIASTOLIC BLOOD PRESSURE: 86 MMHG

## 2021-07-27 DIAGNOSIS — E55.9 VITAMIN D DEFICIENCY: ICD-10-CM

## 2021-07-27 DIAGNOSIS — G89.4 CHRONIC PAIN SYNDROME: ICD-10-CM

## 2021-07-27 DIAGNOSIS — F41.9 ANXIETY: ICD-10-CM

## 2021-07-27 DIAGNOSIS — Z99.3 WHEELCHAIR BOUND: ICD-10-CM

## 2021-07-27 DIAGNOSIS — Z13.220 SCREENING CHOLESTEROL LEVEL: ICD-10-CM

## 2021-07-27 DIAGNOSIS — G35 MULTIPLE SCLEROSIS (HCC): ICD-10-CM

## 2021-07-27 DIAGNOSIS — Z12.31 ENCOUNTER FOR SCREENING MAMMOGRAM FOR MALIGNANT NEOPLASM OF BREAST: Primary | ICD-10-CM

## 2021-07-27 DIAGNOSIS — E05.90 HYPERTHYROIDISM: ICD-10-CM

## 2021-07-27 DIAGNOSIS — E67.8 EXCESSIVE CARBOHYDRATE INTAKE: ICD-10-CM

## 2021-07-27 PROBLEM — N30.00 ACUTE CYSTITIS WITHOUT HEMATURIA: Status: RESOLVED | Noted: 2020-09-22 | Resolved: 2021-07-27

## 2021-07-27 PROBLEM — B34.9 VIRAL INFECTION, UNSPECIFIED: Status: RESOLVED | Noted: 2021-02-26 | Resolved: 2021-07-27

## 2021-07-27 PROBLEM — R73.01 IMPAIRED FASTING GLUCOSE: Status: RESOLVED | Noted: 2020-09-22 | Resolved: 2021-07-27

## 2021-07-27 PROCEDURE — 99214 OFFICE O/P EST MOD 30 MIN: CPT | Performed by: FAMILY MEDICINE

## 2021-07-27 RX ORDER — TRAZODONE HYDROCHLORIDE 50 MG/1
50 TABLET ORAL
Qty: 30 TABLET | Refills: 5 | Status: SHIPPED | OUTPATIENT
Start: 2021-07-27 | End: 2022-02-07 | Stop reason: SDUPTHER

## 2021-07-27 RX ORDER — ALPRAZOLAM 1 MG/1
1 TABLET ORAL 2 TIMES DAILY PRN
Qty: 60 TABLET | Refills: 1 | Status: SHIPPED | OUTPATIENT
Start: 2021-07-27 | End: 2022-02-07 | Stop reason: SDUPTHER

## 2021-07-27 RX ORDER — AMINO ACIDS/PROTEIN HYDROLYS 11G-40/45
30 LIQUID IN PACKET (ML) ORAL DAILY
Qty: 887 ML | Refills: 1 | Status: SHIPPED | OUTPATIENT
Start: 2021-07-27

## 2021-07-27 NOTE — PROGRESS NOTES
Assessment/Plan:    1  Encounter for screening mammogram for malignant neoplasm of breast  -     Mammo screening bilateral w cad; Future; Expected date: 2021    2  Anxiety  -     ALPRAZolam (XANAX) 1 mg tablet; Take 1 tablet (1 mg total) by mouth 2 (two) times a day as needed for anxiety  -     traZODone (DESYREL) 50 mg tablet; Take 1 tablet (50 mg total) by mouth daily at bedtime    3  Multiple sclerosis (HCC)  -     Comprehensive metabolic panel; Future  -     Nutritional Supplements (ProSource No Carb) LIQD; Take 30 mL by mouth daily    4  Wheelchair bound    5  Vitamin D deficiency  -     Vitamin D 25 hydroxy; Future    6  Chronic pain syndrome    7  Hyperthyroidism  -     TSH, 3rd generation; Future  -     T4, free; Future    8  Screening cholesterol level  -     Lipid panel; Future    9  Excessive carbohydrate intake  -     Nutritional Supplements (ProSource No Carb) LIQD; Take 30 mL by mouth daily    I advised increase protein intake since patient eats carbs for her meals and nothing else  I also advised frequent position changes to avoid skin breakdown  I advised that she or her  bring in all her medications that she has since apparently they have a lot of  medications  Check lab work and return in 1 month and we will proceed with changes if needed  BMI Counseling: There is no height or weight on file to calculate BMI  Follow-up plan was not completed due to elderly patient (71 years old) where weight reduction/weight gain would complicate underlying health condition such as: illness or physical disability  There are no Patient Instructions on file for this visit  Return in about 4 weeks (around 2021) for Recheck  Subjective:      Patient ID: Rita Grimes is a 69 y o  female  Chief Complaint   Patient presents with    Follow-up     8 m f/u visit for chronic conditions  Review labs from 2020    Recommended a mammogram   She would like an RX for Trazadone 150 mg qhs   Abdominal Pain     She c/o lower right sided abdominal pain over the past few months that is worse on movement  HPI     Follow-up (10 m f/u visit for chronic conditions   Review labs from 11/23/2020   Recommended a mammogram   She would like an RX for Trazadone 150 mg qhs )   Abdominal Pain (She c/o lower right sided abdominal pain over the past few months that is worse on movement   )    Vitamin-D deficiency  Denies high fatty starchy diet level checked recently is at 19  She is not taking any over-the-counter medications  Two thousand nineteen, did not having lab work done July 2021, stop taking vitamin D and has not had any lab work done since fall 2020     Hypercholesterolemia, unknown family history from mom and dad since she is adopted however her biological sister has high cholesterol is being treated for  She is not sure where her level has been in the past   LDL is elevated as well as triglycerides  July 2021, due for labs     Opiate induced constipation, much better and requiring less opioids  Started medical marijuana and doing well     Neuropathy, started medical marijuana and neuropathy symptoms have significantly improved  Requiring less pain medication  Did well with increased dose of Lyrica to twice a day and would like to continue with this dose  Stress and anxiety, on 3 different medications but states it is not working well since she has had significant stress over the past year from her son who is a single dad and has financial issues and now bladder job in [place]  She is fearful that she will never see her granddaughter again when they moved help there and this brings her great stress and also affects their marriage  She has no HI or SI but has periods of somnolence and cries a lot    She is not sure of the dosages but thinks she is on the medications that we have in our list    The following portions of the patient's history were reviewed and updated as appropriate: allergies, current medications, past family history, past medical history, past social history, past surgical history and problem list     Review of Systems      Constitutional:  Denies fever or chills   Eyes:  Denies double or blurry vision  HENT:  Denies nasal congestion or sore throat   Respiratory:  Denies cough or shortness of breath or wheezing  Cardiovascular:  Denies palpitations or chest pain  GI:  Denies abdominal pain, nausea, or vomiting, no loose stools, no reflux  Integument:  Denies rash , no open areas  Neurologic:  Denies headache or focal weakness, no dizziness  : no dysuria        Current Outpatient Medications   Medication Sig Dispense Refill    alendronate (FOSAMAX) 70 mg tablet Take 1 tablet (70 mg total) by mouth every 7 days Take with full glass water, on empty stomach, do not eat or lay down for 30 min  12 tablet 3    ALPRAZolam (XANAX) 1 mg tablet Take 1 tablet (1 mg total) by mouth 2 (two) times a day as needed for anxiety 60 tablet 1    aspirin-dipyridamole (AGGRENOX)  mg per 12 hr capsule Take 1 capsule by mouth every 12 (twelve) hours      baclofen 10 mg tablet Take 1 tablet (10 mg total) by mouth 4 (four) times a day 120 tablet 5    buPROPion (WELLBUTRIN SR) 200 MG 12 hr tablet Take 1 tablet by mouth 2 (two) times a day        carBAMazepine (TEGretol XR) 200 mg 12 hr tablet Take 200 mg by mouth 2 (two) times a day        ergocalciferol (VITAMIN D2) 50,000 units Take 1 capsule (50,000 Units total) by mouth once a week 12 capsule 1    levothyroxine 200 mcg tablet TAKE 1 TABLET BY MOUTH  DAILY      LINZESS 290 MCG CAPS 1 capsule daily       methadone (DOLOPHINE) 10 mg tablet Take two tabs by mouth three times daily and three tabs at bedtime    Long term medication,      methylphenidate (RITALIN) 20 MG tablet Three tabs daily - Long term medication      morphine (MS CONTIN) 60 mg 12 hr tablet 2 tabs in am; 1 tab mid-morning; 1 tab mid afternoon; 2 tabs at bedtime  Long term medication      pantoprazole (PROTONIX) 40 mg tablet Take 1 tablet (40 mg total) by mouth daily 90 tablet 1    PARoxetine (PAXIL) 40 MG tablet Take 1 tablet (40 mg total) by mouth daily 90 tablet 1    pregabalin (LYRICA) 50 mg capsule Take 1 capsule (50 mg total) by mouth 2 (two) times a day 180 capsule 1    prochlorperazine (COMPAZINE) 25 mg suppository Insert 25 mg into the rectum daily as needed for nausea or vomiting      promethazine (PHENERGAN) 25 mg suppository Insert 1 suppository (25 mg total) into the rectum every 6 (six) hours as needed for nausea or vomiting 12 each 5    promethazine (PHENERGAN) 25 mg tablet Take 1 tablet (25 mg total) by mouth every 6 (six) hours as needed for nausea or vomiting 60 tablet 5    Cholecalciferol 2000 units CAPS Take 1 capsule by mouth (Patient not taking: Reported on 7/27/2021)      Nutritional Supplements (ProSource No Carb) LIQD Take 30 mL by mouth daily 887 mL 1    phenazopyridine (PYRIDIUM) 200 mg tablet Take 1 tablet (200 mg total) by mouth 3 (three) times a day with meals (Patient not taking: Reported on 2/26/2021) 9 tablet 0    traZODone (DESYREL) 50 mg tablet Take 1 tablet (50 mg total) by mouth daily at bedtime 30 tablet 5     No current facility-administered medications for this visit         Objective:    /86   Pulse 83   Temp 99 9 °F (37 7 °C) (Tympanic)   SpO2 98%        Physical Exam       Constitutional:  Well developed, well nourished, no acute distress, non-toxic appearance   Eyes:  PERRL, conjunctiva normal , non icteric sclera  HENT:  Atraumatic, oropharynx moist  Neck-  supple   Respiratory:  CTA b/l, normal breath sounds, no rales, no wheezing   Cardiovascular:  RRR, no murmurs, no LE edema b/l  GI:  Soft, nondistended, normal bowel sounds x 4, nontender, no organomegaly, no mass, no rebound, no guarding   Neurologic:  no focal deficits noted   Psychiatric:  Speech and behavior appropriate , AAO x 3  MS: wheelchair bound      Claudeen Jolly, DO

## 2021-07-30 ENCOUNTER — TELEPHONE (OUTPATIENT)
Dept: INTERNAL MEDICINE CLINIC | Age: 71
End: 2021-07-30

## 2021-07-30 NOTE — TELEPHONE ENCOUNTER
----- Message from Crispin Akhtar DO sent at 7/27/2021  2:31 PM EDT -----  Patient needs home lab draw for fasting labs ASAP    Thank you

## 2021-08-03 ENCOUNTER — OFFICE VISIT (OUTPATIENT)
Dept: INTERNAL MEDICINE CLINIC | Facility: CLINIC | Age: 71
End: 2021-08-03
Payer: MEDICARE

## 2021-08-03 VITALS
DIASTOLIC BLOOD PRESSURE: 88 MMHG | RESPIRATION RATE: 18 BRPM | SYSTOLIC BLOOD PRESSURE: 152 MMHG | HEART RATE: 84 BPM | TEMPERATURE: 99.1 F

## 2021-08-03 DIAGNOSIS — Z12.11 SPECIAL SCREENING FOR MALIGNANT NEOPLASMS, COLON: Primary | ICD-10-CM

## 2021-08-03 DIAGNOSIS — N31.9 NEUROGENIC BLADDER: Primary | ICD-10-CM

## 2021-08-03 DIAGNOSIS — N30.00 ACUTE CYSTITIS WITHOUT HEMATURIA: ICD-10-CM

## 2021-08-03 LAB
SL AMB  POCT GLUCOSE, UA: NORMAL
SL AMB LEUKOCYTE ESTERASE,UA: NORMAL
SL AMB POCT BILIRUBIN,UA: NORMAL
SL AMB POCT BLOOD,UA: NORMAL
SL AMB POCT CLARITY,UA: NORMAL
SL AMB POCT COLOR,UA: YELLOW
SL AMB POCT KETONES,UA: NORMAL
SL AMB POCT NITRITE,UA: POSITIVE
SL AMB POCT PH,UA: 7
SL AMB POCT SPECIFIC GRAVITY,UA: 1.02
SL AMB POCT URINE PROTEIN: NORMAL
SL AMB POCT UROBILINOGEN: NORMAL

## 2021-08-03 PROCEDURE — 81003 URINALYSIS AUTO W/O SCOPE: CPT | Performed by: FAMILY MEDICINE

## 2021-08-03 PROCEDURE — 99213 OFFICE O/P EST LOW 20 MIN: CPT | Performed by: FAMILY MEDICINE

## 2021-08-03 RX ORDER — CIPROFLOXACIN 500 MG/1
500 TABLET, FILM COATED ORAL EVERY 12 HOURS SCHEDULED
Qty: 14 TABLET | Refills: 0 | Status: SHIPPED | OUTPATIENT
Start: 2021-08-03 | End: 2021-08-10

## 2021-08-03 RX ORDER — POLYETHYLENE GLYCOL 3350 17 G/17G
238 POWDER, FOR SOLUTION ORAL ONCE
Status: CANCELLED | OUTPATIENT
Start: 2021-08-03 | End: 2021-08-03

## 2021-08-03 NOTE — PROGRESS NOTES
Assessment/Plan:    1  Neurogenic bladder  -     ciprofloxacin (CIPRO) 500 mg tablet; Take 1 tablet (500 mg total) by mouth every 12 (twelve) hours for 7 days    2  Acute cystitis without hematuria  -     ciprofloxacin (CIPRO) 500 mg tablet; Take 1 tablet (500 mg total) by mouth every 12 (twelve) hours for 7 days        Increase trazodone to 100 mg and call in 2 weeks  Patient and her  very insistent on getting prescription for 150 since she has been taking her 's tablets for sleep that have been working however I am uncomfortable increasing and giving her that dose at this time          There are no Patient Instructions on file for this visit  Return for Next scheduled follow up  Subjective:      Patient ID: Alpa Ray is a 69 y o  female  Chief Complaint   Patient presents with    Urinary Tract Infection     feels she has an uti, burning, frequency  for about a week or so   healh maintenance     AWV due after 9/22/21       HPI      burning, frequency in completely urinating  Patient with history of neurogenic bladder and does self catheterization 4 times a day which is not listed in her past medical history  Records from her neurologist are not available at this time  Insomnia, patient has been having trouble sleeping for several months due to stress at home and was taking her 's trazodone 150 mg tablet since he did not need it  It did help her sleep  At last visit I prescribed 50 mg for her since this is a new medication for her technically and she states now it has not been helping her sleep    She would like an increase in her dose    The following portions of the patient's history were reviewed and updated as appropriate: allergies, current medications, past family history, past medical history, past social history, past surgical history and problem list     Review of Systems        Constitutional:  Denies fever or chills   Eyes:  Denies double or blurry vision  HENT:  Denies nasal congestion or sore throat   Respiratory:  Denies cough or shortness of breath or wheezing  Cardiovascular:  Denies palpitations or chest pain  GI:  Denies abdominal pain, nausea, or vomiting, no loose stools, no reflux  Integument:  Denies rash , no open areas  Neurologic:  Denies headache or focal weakness, no dizziness  : no dysuria      Current Outpatient Medications   Medication Sig Dispense Refill    alendronate (FOSAMAX) 70 mg tablet Take 1 tablet (70 mg total) by mouth every 7 days Take with full glass water, on empty stomach, do not eat or lay down for 30 min  12 tablet 3    ALPRAZolam (XANAX) 1 mg tablet Take 1 tablet (1 mg total) by mouth 2 (two) times a day as needed for anxiety 60 tablet 1    aspirin-dipyridamole (AGGRENOX)  mg per 12 hr capsule Take 1 capsule by mouth every 12 (twelve) hours      baclofen 10 mg tablet Take 1 tablet (10 mg total) by mouth 4 (four) times a day 120 tablet 5    bisacodyl (DULCOLAX) 5 mg EC tablet Please follow prep instructions provided by the office  2 tablet 0    buPROPion (WELLBUTRIN SR) 200 MG 12 hr tablet Take 1 tablet by mouth 2 (two) times a day        carBAMazepine (TEGretol XR) 200 mg 12 hr tablet Take 200 mg by mouth 2 (two) times a day        ergocalciferol (VITAMIN D2) 50,000 units Take 1 capsule (50,000 Units total) by mouth once a week 12 capsule 1    levothyroxine 200 mcg tablet TAKE 1 TABLET BY MOUTH  DAILY      LINZESS 290 MCG CAPS 1 capsule daily       methadone (DOLOPHINE) 10 mg tablet Take two tabs by mouth three times daily and three tabs at bedtime  Long term medication,      methylphenidate (RITALIN) 20 MG tablet Three tabs daily - Long term medication      morphine (MS CONTIN) 60 mg 12 hr tablet 2 tabs in am; 1 tab mid-morning; 1 tab mid afternoon; 2 tabs at bedtime    Long term medication      Nutritional Supplements (ProSource No Carb) LIQD Take 30 mL by mouth daily 887 mL 1    pantoprazole (PROTONIX) 40 mg tablet Take 1 tablet (40 mg total) by mouth daily 90 tablet 1    PARoxetine (PAXIL) 40 MG tablet Take 1 tablet (40 mg total) by mouth daily 90 tablet 1    polyethylene glycol (GLYCOLAX) 17 GM/SCOOP powder Take as directed by the office  238 g 0    pregabalin (LYRICA) 50 mg capsule Take 1 capsule (50 mg total) by mouth 2 (two) times a day 180 capsule 1    prochlorperazine (COMPAZINE) 25 mg suppository Insert 25 mg into the rectum daily as needed for nausea or vomiting      promethazine (PHENERGAN) 25 mg suppository Insert 1 suppository (25 mg total) into the rectum every 6 (six) hours as needed for nausea or vomiting 12 each 5    promethazine (PHENERGAN) 25 mg tablet Take 1 tablet (25 mg total) by mouth every 6 (six) hours as needed for nausea or vomiting 60 tablet 5    traZODone (DESYREL) 50 mg tablet Take 1 tablet (50 mg total) by mouth daily at bedtime 30 tablet 5    Cholecalciferol 2000 units CAPS Take 1 capsule by mouth (Patient not taking: Reported on 7/27/2021)      ciprofloxacin (CIPRO) 500 mg tablet Take 1 tablet (500 mg total) by mouth every 12 (twelve) hours for 7 days 14 tablet 0     No current facility-administered medications for this visit         Objective:    /88 (BP Location: Left arm, Patient Position: Sitting, Cuff Size: Standard)   Pulse 84   Temp 99 1 °F (37 3 °C) (Temporal)   Resp 18        Physical Exam       Constitutional:  Well developed, well nourished, no acute distress, non-toxic appearance   Eyes:  PERRL, conjunctiva normal , non icteric sclera  HENT:  Atraumatic, oropharynx moist  Neck-  supple   Respiratory:  CTA b/l, normal breath sounds, no rales, no wheezing   Cardiovascular:  RRR, no murmurs, no LE edema b/l  GI:  Soft, nondistended, normal bowel sounds x 4, nontender, no organomegaly, no mass, no rebound, no guarding , slight tender to palpation over the bladder  Neurologic:  no focal deficits noted   Psychiatric:  Speech and behavior appropriate , AAO x 3        Donalee Glenny, DO

## 2021-08-03 NOTE — TELEPHONE ENCOUNTER
TC from pt's spouse Blanca Sawant, to verify procedure pt is scheduled for and how to answer question on registration form for GI lab  Confirmed with spouse that pt is scheduled for colonoscopy  Spouse also requested rx sent to [facility] for glycolax prep

## 2021-08-13 ENCOUNTER — TELEPHONE (OUTPATIENT)
Dept: INTERNAL MEDICINE CLINIC | Facility: CLINIC | Age: 71
End: 2021-08-13

## 2021-08-13 NOTE — TELEPHONE ENCOUNTER
I sent to prescriptions to her pharmacy that she can complete  If not improved, then will need repeat urine test and possible appointment  Chart reviewed    Thank you

## 2021-08-13 NOTE — TELEPHONE ENCOUNTER
Pt was seen for a uti and the prescription she has been on 5 days is not helping at all  What do you recommend?

## 2021-08-31 NOTE — TELEPHONE ENCOUNTER
Clearance request for Aggrenox to be held x5 days in advance of procedure 9/15/21 faxed to Dr Jen Echavarria on 7/23/21, and 8/31/21

## 2021-09-09 NOTE — TELEPHONE ENCOUNTER
Faxed clearance to hold Aggrenox x5 days in advance of procedure on 9/15/21 found scanned into pt's chart under Media Tab  TC to pt to notify of clearance to hold medication  Patient indicated agreement

## 2021-09-14 ENCOUNTER — TELEPHONE (OUTPATIENT)
Dept: GASTROENTEROLOGY | Facility: HOSPITAL | Age: 71
End: 2021-09-14

## 2021-09-15 ENCOUNTER — ANESTHESIA (OUTPATIENT)
Dept: GASTROENTEROLOGY | Facility: HOSPITAL | Age: 71
End: 2021-09-15

## 2021-09-15 ENCOUNTER — HOSPITAL ENCOUNTER (OUTPATIENT)
Dept: GASTROENTEROLOGY | Facility: HOSPITAL | Age: 71
Setting detail: OUTPATIENT SURGERY
Discharge: HOME | End: 2021-09-15
Attending: INTERNAL MEDICINE
Payer: MEDICARE

## 2021-09-15 ENCOUNTER — ANESTHESIA EVENT (OUTPATIENT)
Dept: GASTROENTEROLOGY | Facility: HOSPITAL | Age: 71
End: 2021-09-15

## 2021-09-15 VITALS
RESPIRATION RATE: 18 BRPM | SYSTOLIC BLOOD PRESSURE: 129 MMHG | OXYGEN SATURATION: 92 % | BODY MASS INDEX: 28 KG/M2 | TEMPERATURE: 98 F | WEIGHT: 200 LBS | DIASTOLIC BLOOD PRESSURE: 68 MMHG | HEART RATE: 81 BPM | HEIGHT: 71 IN

## 2021-09-15 DIAGNOSIS — Z12.11 SPECIAL SCREENING FOR MALIGNANT NEOPLASMS, COLON: ICD-10-CM

## 2021-09-15 PROCEDURE — 45330 DIAGNOSTIC SIGMOIDOSCOPY: CPT | Performed by: INTERNAL MEDICINE

## 2021-09-15 RX ORDER — LIDOCAINE HYDROCHLORIDE 10 MG/ML
INJECTION, SOLUTION EPIDURAL; INFILTRATION; INTRACAUDAL; PERINEURAL AS NEEDED
Status: DISCONTINUED | OUTPATIENT
Start: 2021-09-15 | End: 2021-09-15

## 2021-09-15 RX ORDER — PROPOFOL 10 MG/ML
INJECTION, EMULSION INTRAVENOUS AS NEEDED
Status: DISCONTINUED | OUTPATIENT
Start: 2021-09-15 | End: 2021-09-15

## 2021-09-15 RX ORDER — PROPOFOL 10 MG/ML
INJECTION, EMULSION INTRAVENOUS CONTINUOUS PRN
Status: DISCONTINUED | OUTPATIENT
Start: 2021-09-15 | End: 2021-09-15

## 2021-09-15 RX ORDER — ONDANSETRON 2 MG/ML
4 INJECTION INTRAMUSCULAR; INTRAVENOUS EVERY 6 HOURS PRN
Status: DISCONTINUED | OUTPATIENT
Start: 2021-09-15 | End: 2021-09-19 | Stop reason: HOSPADM

## 2021-09-15 RX ORDER — SODIUM CHLORIDE 9 MG/ML
INJECTION, SOLUTION INTRAVENOUS CONTINUOUS PRN
Status: DISCONTINUED | OUTPATIENT
Start: 2021-09-15 | End: 2021-09-15

## 2021-09-15 RX ADMIN — ONDANSETRON 4 MG: 2 INJECTION INTRAMUSCULAR; INTRAVENOUS at 16:59

## 2021-09-15 RX ADMIN — PROPOFOL 90 MG: 10 INJECTION, EMULSION INTRAVENOUS at 16:13

## 2021-09-15 RX ADMIN — SODIUM CHLORIDE: 0.9 INJECTION, SOLUTION INTRAVENOUS at 16:10

## 2021-09-15 RX ADMIN — LIDOCAINE HYDROCHLORIDE 50 MG: 10 INJECTION, SOLUTION EPIDURAL; INFILTRATION; INTRACAUDAL; PERINEURAL at 16:13

## 2021-09-15 RX ADMIN — PROPOFOL 120 MCG/KG/MIN: 10 INJECTION, EMULSION INTRAVENOUS at 16:13

## 2021-09-15 NOTE — ANESTHESIA POSTPROCEDURE EVALUATION
Post-Op Assessment Note    CV Status:  Stable  Pain Score: 0    Pain management: adequate     Mental Status:  Sleepy   Hydration Status:  Euvolemic   PONV Controlled:  Controlled   Airway Patency:  Patent      Post Op Vitals Reviewed: Yes      Staff: CRNA         No complications documented      /55 (09/15/21 1626)    Temp      Pulse 60 (09/15/21 1626)   Resp 18 (09/15/21 1626)    SpO2 96 % (09/15/21 1626)

## 2021-09-15 NOTE — ANESTHESIA PREPROCEDURE EVALUATION
Procedure:  COLONOSCOPY    Relevant Problems   NEURO/PSYCH   (+) Chronic pain      Other   (+) Acute cystitis without hematuria   (+) Drug-induced constipation   (+) Excessive carbohydrate intake   (+) Medical marijuana use   (+) Multiple sclerosis (HCC)   (+) Neurogenic bladder   (+) Wheelchair bound      Lab Results   Component Value Date    SODIUM 139 12/21/2018    K 4 0 12/21/2018     12/21/2018    CO2 30 12/21/2018    AGAP 5 12/21/2018    BUN 14 12/21/2018    CREATININE 0 68 12/21/2018    GLUF 94 12/21/2018    CALCIUM 9 2 12/21/2018    AST 43 12/21/2018    ALT 60 12/21/2018    ALKPHOS 94 12/21/2018    TP 6 6 12/21/2018    TBILI 0 32 12/21/2018    EGFR 90 12/21/2018     Lab Results   Component Value Date    WBC 7 69 12/21/2018    HGB 12 2 12/21/2018    HCT 39 4 12/21/2018    MCV 97 12/21/2018     12/21/2018            Physical Exam    Airway    Mallampati score: II  TM Distance: >3 FB  Neck ROM: full     Dental       Cardiovascular      Pulmonary      Other Findings        Anesthesia Plan  ASA Score- 3     Anesthesia Type- IV sedation with anesthesia with ASA Monitors  Additional Monitors:   Airway Plan:           Plan Factors-Exercise tolerance (METS): <4 METS  Chart reviewed  Existing labs reviewed  Patient is not a current smoker  Patient did not smoke on day of surgery  Induction- intravenous  Postoperative Plan-     Informed Consent- Anesthetic plan and risks discussed with patient and spouse  I personally reviewed this patient with the CRNA  Discussed and agreed on the Anesthesia Plan with the CRNA  Theresa Mcmahon

## 2021-09-30 ENCOUNTER — TELEPHONE (OUTPATIENT)
Dept: GASTROENTEROLOGY | Facility: CLINIC | Age: 71
End: 2021-09-30

## 2021-09-30 NOTE — TELEPHONE ENCOUNTER
----- Message from Krista Warren MD sent at 9/15/2021  4:51 PM EDT -----  Eulalialo  Will need to reschedule patient for another day due to poor prep  Please call patient's  and go over prep  I would recommend the following :   1  Magnesium citrate 10 oz with three cups of water ( 3 days before colon)  2  Magnesium citrate 10 oz with three cups of water (2 days before colon)  3  Suprep one day before prep or sutabs  Please go over this with the patient's  and also what she can drink the day before  Thank you     aLuren Ring

## 2021-10-09 ENCOUNTER — IMMUNIZATIONS (OUTPATIENT)
Dept: FAMILY MEDICINE CLINIC | Facility: HOSPITAL | Age: 71
End: 2021-10-09

## 2021-10-09 DIAGNOSIS — Z23 ENCOUNTER FOR IMMUNIZATION: Primary | ICD-10-CM

## 2021-10-09 PROCEDURE — 0001A SARS-COV-2 / COVID-19 MRNA VACCINE (PFIZER-BIONTECH) 30 MCG: CPT

## 2021-10-09 PROCEDURE — 91300 SARS-COV-2 / COVID-19 MRNA VACCINE (PFIZER-BIONTECH) 30 MCG: CPT

## 2021-10-14 ENCOUNTER — HOSPITAL ENCOUNTER (OUTPATIENT)
Dept: RADIOLOGY | Facility: IMAGING CENTER | Age: 71
Discharge: HOME | End: 2021-10-14
Payer: MEDICARE

## 2021-10-14 VITALS — WEIGHT: 200 LBS | BODY MASS INDEX: 28 KG/M2 | HEIGHT: 71 IN

## 2021-10-14 DIAGNOSIS — Z12.31 ENCOUNTER FOR SCREENING MAMMOGRAM FOR MALIGNANT NEOPLASM OF BREAST: ICD-10-CM

## 2021-10-14 PROCEDURE — 77067 SCR MAMMO BI INCL CAD: CPT

## 2021-10-14 PROCEDURE — 77063 BREAST TOMOSYNTHESIS BI: CPT

## 2021-10-15 DIAGNOSIS — E55.9 VITAMIN D DEFICIENCY: ICD-10-CM

## 2021-10-15 RX ORDER — ERGOCALCIFEROL 1.25 MG/1
50000 CAPSULE ORAL WEEKLY
Qty: 12 CAPSULE | Refills: 1 | Status: SHIPPED | OUTPATIENT
Start: 2021-10-15 | End: 2021-12-27 | Stop reason: SDUPTHER

## 2021-12-27 DIAGNOSIS — E55.9 VITAMIN D DEFICIENCY: ICD-10-CM

## 2021-12-27 RX ORDER — ERGOCALCIFEROL 1.25 MG/1
50000 CAPSULE ORAL WEEKLY
Qty: 12 CAPSULE | Refills: 1 | Status: SHIPPED | OUTPATIENT
Start: 2021-12-27 | End: 2022-04-26 | Stop reason: SDUPTHER

## 2022-01-17 DIAGNOSIS — K21.9 GASTROESOPHAGEAL REFLUX DISEASE WITHOUT ESOPHAGITIS: ICD-10-CM

## 2022-01-17 RX ORDER — PANTOPRAZOLE SODIUM 40 MG/1
40 TABLET, DELAYED RELEASE ORAL DAILY
Qty: 90 TABLET | Refills: 0 | Status: SHIPPED | OUTPATIENT
Start: 2022-01-17 | End: 2022-06-06 | Stop reason: SDUPTHER

## 2022-01-31 ENCOUNTER — RA CDI HCC (OUTPATIENT)
Dept: OTHER | Facility: HOSPITAL | Age: 72
End: 2022-01-31

## 2022-01-31 NOTE — PROGRESS NOTES
[facility]  coding opportunities       Chart reviewed, no opportunity found: CHART REVIEWED, NO OPPORTUNITY FOUND                        Patients insurance company: [employer]

## 2022-02-07 ENCOUNTER — OFFICE VISIT (OUTPATIENT)
Dept: INTERNAL MEDICINE CLINIC | Age: 72
End: 2022-02-07
Payer: MEDICARE

## 2022-02-07 VITALS
SYSTOLIC BLOOD PRESSURE: 108 MMHG | DIASTOLIC BLOOD PRESSURE: 60 MMHG | HEART RATE: 93 BPM | TEMPERATURE: 99.4 F | OXYGEN SATURATION: 93 %

## 2022-02-07 DIAGNOSIS — G35 MULTIPLE SCLEROSIS (HCC): ICD-10-CM

## 2022-02-07 DIAGNOSIS — K59.03 DRUG-INDUCED CONSTIPATION: ICD-10-CM

## 2022-02-07 DIAGNOSIS — Z13.220 SCREENING CHOLESTEROL LEVEL: ICD-10-CM

## 2022-02-07 DIAGNOSIS — F11.20 CONTINUOUS OPIOID DEPENDENCE (HCC): ICD-10-CM

## 2022-02-07 DIAGNOSIS — F33.9 DEPRESSION, RECURRENT (HCC): ICD-10-CM

## 2022-02-07 DIAGNOSIS — F41.9 ANXIETY: ICD-10-CM

## 2022-02-07 DIAGNOSIS — E03.9 ACQUIRED HYPOTHYROIDISM: ICD-10-CM

## 2022-02-07 DIAGNOSIS — E55.9 VITAMIN D DEFICIENCY: Primary | ICD-10-CM

## 2022-02-07 DIAGNOSIS — G89.4 CHRONIC PAIN SYNDROME: ICD-10-CM

## 2022-02-07 DIAGNOSIS — Z99.3 WHEELCHAIR BOUND: ICD-10-CM

## 2022-02-07 PROBLEM — N30.00 ACUTE CYSTITIS WITHOUT HEMATURIA: Status: RESOLVED | Noted: 2020-09-22 | Resolved: 2022-02-07

## 2022-02-07 PROCEDURE — 99214 OFFICE O/P EST MOD 30 MIN: CPT | Performed by: FAMILY MEDICINE

## 2022-02-07 RX ORDER — ALPRAZOLAM 1 MG/1
1 TABLET ORAL 2 TIMES DAILY PRN
Qty: 60 TABLET | Refills: 1 | Status: SHIPPED | OUTPATIENT
Start: 2022-02-07

## 2022-02-07 RX ORDER — TRAZODONE HYDROCHLORIDE 150 MG/1
150 TABLET ORAL
Qty: 90 TABLET | Refills: 1 | Status: SHIPPED | OUTPATIENT
Start: 2022-02-07

## 2022-02-07 NOTE — PROGRESS NOTES
Assessment/Plan:    1  Vitamin D deficiency  -     Vitamin D 25 hydroxy; Future  -     CBC and differential; Future    2  Anxiety  -     ALPRAZolam (XANAX) 1 mg tablet; Take 1 tablet (1 mg total) by mouth 2 (two) times a day as needed for anxiety  -     traZODone (DESYREL) 150 mg tablet; Take 1 tablet (150 mg total) by mouth daily at bedtime  -     TSH, 3rd generation with Free T4 reflex; Future  -     Comprehensive metabolic panel; Future  -     CBC and differential; Future    3  Wheelchair bound    4  Chronic pain syndrome    5  Multiple sclerosis (HCC)  -     CBC and differential; Future    6  Screening cholesterol level  -     Lipid panel; Future    7  Continuous opioid dependence ([facility] )    8  Depression, recurrent ([facility] )    9  Drug-induced constipation    10  Acquired hypothyroidism    Check lab work ASAP, increase trazodone to 150 mg  Advised not sleeping in the daytime and taking naps and only sleeping at night  All questions and concerns addressed to the best of my ability  Add Senokot on a daily routine to lower the use of Dulcolax since 8 tablets in 24 hour period are too much  Discussed keeping up with Neurology appointments  There are no Patient Instructions on file for this visit  Return in about 6 months (around 8/7/2022) for Recheck  Subjective:      Patient ID: Gaby Hernandez is a 66 y o  female  Chief Complaint   Patient presents with    Follow-up     MS- pt wants to discuss inc of trazadone - it is not working fo pt        HPI     Insomnia, patient has been having trouble sleeping for several months due to stress at home and was taking her 's trazodone 150 mg tablet since he did not need it  It did help her sleep  At last visit I prescribed 50 mg for her since this is a new medication for her technically and she states now it has not been helping her sleep    She would like an increase in her dose  February 2022, taking 100 mg of trazodone and still napping in the daytime but does sleep a few hours at night  Once and increased to 150 mg       Vitamin-D deficiency   Denies high fatty starchy diet level checked recently is at 23   She is not taking any over-the-counter medications   [birth year], did not having lab work done July 2021, stop taking vitamin D and has not had any lab work done since fall 2020 February 2022, according to her she is not taking any vitamin-D supplementation  Last levels were lower checked 1 year ago  No updated accurate blood work     Hypercholesterolemia, unknown family history from mom and dad since she is adopted however her biological sister has high cholesterol is being treated for  Stefania Sabmela is not sure where her level has been in the past   LDL is elevated as well as triglycerides  July 2021, due for labs   February 2022, no updated blood work    Opiate induced constipation, much better and requiring less opioids   Started medical marijuana and doing well  February 2022, uses Dulcolax 8 tablets to have a successful bowel movement  Uses magnesium citrate as a last resort  Has never tried Senokot     Neuropathy, started medical marijuana and neuropathy symptoms have significantly improved   Requiring less pain medication   Did well with increased dose of Lyrica to twice a day and would like to continue with this dose        The following portions of the patient's history were reviewed and updated as appropriate: allergies, current medications, past family history, past medical history, past social history, past surgical history and problem list     Review of Systems      Constitutional:  Denies fever or chills   Eyes:  Denies double , blurry vision or eye pain  HENT:  Denies nasal congestion or sore throat   Respiratory:  Denies cough or shortness of breath or wheezing  Cardiovascular:  Denies palpitations or chest pain  GI:  Denies abdominal pain, nausea, or vomiting, no loose stools, no reflux  Integument:  Denies rash , no open areas  Neurologic:  Denies headache or focal weakness, no dizziness  : no dysuria, or hematuria        Current Outpatient Medications   Medication Sig Dispense Refill    alendronate (FOSAMAX) 70 mg tablet Take 1 tablet (70 mg total) by mouth every 7 days Take with full glass water, on empty stomach, do not eat or lay down for 30 min  12 tablet 3    ALPRAZolam (XANAX) 1 mg tablet Take 1 tablet (1 mg total) by mouth 2 (two) times a day as needed for anxiety 60 tablet 1    aspirin-dipyridamole (AGGRENOX)  mg per 12 hr capsule Take 1 capsule by mouth every 12 (twelve) hours      baclofen 10 mg tablet Take 1 tablet (10 mg total) by mouth 4 (four) times a day 120 tablet 5    bisacodyl (DULCOLAX) 5 mg EC tablet Please follow prep instructions provided by the office  2 tablet 0    buPROPion (WELLBUTRIN SR) 200 MG 12 hr tablet Take 1 tablet by mouth 2 (two) times a day        carBAMazepine (TEGretol XR) 200 mg 12 hr tablet Take 200 mg by mouth 2 (two) times a day        ergocalciferol (VITAMIN D2) 50,000 units Take 1 capsule (50,000 Units total) by mouth once a week 12 capsule 1    levothyroxine 200 mcg tablet TAKE 1 TABLET BY MOUTH  DAILY      LINZESS 290 MCG CAPS 1 capsule daily       methadone (DOLOPHINE) 10 mg tablet Take two tabs by mouth three times daily and three tabs at bedtime  Long term medication,      methylphenidate (RITALIN) 20 MG tablet Three tabs daily - Long term medication      morphine (MS CONTIN) 60 mg 12 hr tablet 2 tabs in am; 1 tab mid-morning; 1 tab mid afternoon; 2 tabs at bedtime  Long term medication      Nutritional Supplements (ProSource No Carb) LIQD Take 30 mL by mouth daily 887 mL 1    pantoprazole (PROTONIX) 40 mg tablet Take 1 tablet (40 mg total) by mouth daily 90 tablet 0    PARoxetine (PAXIL) 40 MG tablet Take 1 tablet (40 mg total) by mouth daily 90 tablet 1    polyethylene glycol (GLYCOLAX) 17 GM/SCOOP powder Take as directed by the office   238 g 0    pregabalin (LYRICA) 50 mg capsule Take 1 capsule (50 mg total) by mouth 2 (two) times a day 180 capsule 1    prochlorperazine (COMPAZINE) 25 mg suppository Insert 25 mg into the rectum daily as needed for nausea or vomiting      promethazine (PHENERGAN) 25 mg suppository Insert 1 suppository (25 mg total) into the rectum every 6 (six) hours as needed for nausea or vomiting 12 each 5    promethazine (PHENERGAN) 25 mg tablet Take 1 tablet (25 mg total) by mouth every 6 (six) hours as needed for nausea or vomiting 60 tablet 5    traZODone (DESYREL) 150 mg tablet Take 1 tablet (150 mg total) by mouth daily at bedtime 90 tablet 1    Cholecalciferol 2000 units CAPS Take 1 capsule by mouth (Patient not taking: Reported on 7/27/2021)      phenazopyridine (PYRIDIUM) 200 mg tablet Take 1 tablet (200 mg total) by mouth 3 (three) times a day with meals (Patient not taking: Reported on 2/7/2022 ) 9 tablet 0     No current facility-administered medications for this visit         Objective:    /60   Pulse 93   Temp 99 4 °F (37 4 °C) (Temporal)   SpO2 93%        Physical Exam        Constitutional:  Well developed, well nourished, no acute distress, non-toxic appearance   Eyes:  PERRL, conjunctiva normal , non icteric sclera  HENT:  Atraumatic, oropharynx moist  Neck-  supple   Respiratory:  CTA b/l, normal breath sounds, no rales, no wheezing   Cardiovascular:  RRR, no murmurs, no LE edema b/l  GI:  Soft, nondistended, normal bowel sounds x 4, nontender, no organomegaly, no mass, no rebound, no guarding   Neurologic:  no focal deficits noted   Psychiatric:  Speech and behavior appropriate , AAO x 3           Willma Batters, DO

## 2022-02-23 ENCOUNTER — OFFICE VISIT (OUTPATIENT)
Dept: GASTROENTEROLOGY | Facility: CLINIC | Age: 72
End: 2022-02-23
Payer: MEDICARE

## 2022-02-23 ENCOUNTER — TELEPHONE (OUTPATIENT)
Dept: GASTROENTEROLOGY | Facility: CLINIC | Age: 72
End: 2022-02-23

## 2022-02-23 VITALS
TEMPERATURE: 99.8 F | HEIGHT: 71 IN | BODY MASS INDEX: 27.72 KG/M2 | DIASTOLIC BLOOD PRESSURE: 82 MMHG | SYSTOLIC BLOOD PRESSURE: 143 MMHG | WEIGHT: 198 LBS

## 2022-02-23 DIAGNOSIS — K59.03 DRUG-INDUCED CONSTIPATION: Primary | ICD-10-CM

## 2022-02-23 DIAGNOSIS — R19.5 POSITIVE COLORECTAL CANCER SCREENING USING COLOGUARD TEST: ICD-10-CM

## 2022-02-23 DIAGNOSIS — G35 MULTIPLE SCLEROSIS (HCC): ICD-10-CM

## 2022-02-23 DIAGNOSIS — F11.20 CONTINUOUS OPIOID DEPENDENCE (HCC): ICD-10-CM

## 2022-02-23 DIAGNOSIS — Z12.11 SCREENING FOR COLON CANCER: ICD-10-CM

## 2022-02-23 PROCEDURE — 99214 OFFICE O/P EST MOD 30 MIN: CPT | Performed by: INTERNAL MEDICINE

## 2022-02-23 RX ORDER — NALOXEGOL OXALATE 25 MG/1
25 TABLET, FILM COATED ORAL DAILY
Qty: 30 TABLET | Refills: 1 | Status: SHIPPED | OUTPATIENT
Start: 2022-02-23 | End: 2022-07-14 | Stop reason: SDUPTHER

## 2022-02-23 NOTE — ASSESSMENT & PLAN NOTE
Previous attempt at colonoscopy in September 2021 unfortunately with inadequate prep  Patient has subsequently had Cologuard testing in November of 2021 which was positive  Patient requires repeat colonoscopy    · Will attempt to schedule patient colonoscopy within the next couple of weeks in setting of her positive Cologuard test  · Patient will require extended bowel prep; will provide patient with samples of Plenvu from the office x2 days  · Bowel prep instructions provided  · Will plan for colonoscopy to be done in the hospital setting due to [address] and other concurrent medical problems

## 2022-02-23 NOTE — ASSESSMENT & PLAN NOTE
Is on chronic morphine for pain associated with progressive MS  Chronic opioid use is certainly exacerbating her constipation  Currently on Linzess 290 mcg daily but states that she takes the medication 30 minutes after eating breakfast   Was previously on MiraLax but has stopped taking this  She is taking Dulcolax 5 mg tablets but admits to taking up to 10 tablets a day to maintain adequate bowel movements daily  Reports soft stools  Also admits to drinking plenty of water    · Patient is on significant dose of stimulant laxatives which I advised needs to be decreased to a safe dose as soon as possible   · However, in context of MS as well as chronic opioid-induced constipation, patient requires strong bowel regimen to maintain adequate bowel habits  · Instructed patient to continue taking Linzess 290 mcg daily but advised her to take the medication 10-15 minutes before breakfast to help maximize the medications affect; she can not even take the medication with meals initially to help move bowels and then gradually titrate to 50 minutes before breakfast if needed   · Advised patient to start decreasing the amount of Dulcolax that she is taking daily  · In context of chronic opioid use due to MS and significant opioid-induced constipation requiring high doses of Dulcolax, will start the patient on Movantik 25 mg daily  · If unable to tolerate the full dose of Movantik, can decrease to 12 5 mg daily  · Also advised patient to supplement fiber in her diet; she can supplement soluble fiber with the for Benefiber available over-the-counter or fiber gummies with a goal of 20-25 g of fiber daily   · I have also advised patient that she can use MiraLax 1 to 2 times a day as needed  · Encouraged continued hydration  · Plan for colonoscopy for positive Cologuard and incomplete prep previously as noted below  · Follow-up in 8 weeks

## 2022-02-23 NOTE — PATIENT INSTRUCTIONS
Start taking Linzess 10-15 minutes before breakfast  Drink plenty of water  Start cutting down on the amount of Dulcolax you are taking  Limit yourself to 2-3 tablets daily of Dulcolax  You can take Miralax 1-2 times a day if you need  You can also supplement fiber in your diet with Benefiber or fiber gummies  We will schedule you for a colonoscopy  Follow the instructions for the Plenvu bowel prep  We will also send for Movantik (a new constipation medication) to your pharmacy  Take this once a day    Scheduled date of colonoscopy (as of today):  3/24/22  Physician performing colonoscopy: Dr Stefan Wesley  [facility] of colonoscopy[facility]   Bowel prep reviewed with patient: Plenvu (2 samples given)   Instructions reviewed with patient by: Kayli Biswas  Clearances:  Aggrenox - Dr Emeka Lainez

## 2022-02-23 NOTE — ASSESSMENT & PLAN NOTE
Constipation certainly exacerbated by her MS and current need for chronic opioid use    · Continue management of MS and narcotic pain regimen by her PCP and other specialists  · Management for opioid-induced constipation as noted above

## 2022-02-23 NOTE — PROGRESS NOTES
[facility] - Outpatient Follow-up  Roby Gandhi 66 y o  female MRN: [id number]  Encounter: [id number]      ASSESSMENT & PLAN:      Problem List Items Addressed This Visit        Digestive    Drug-induced constipation - Primary     Is on chronic morphine for pain associated with progressive MS  Chronic opioid use is certainly exacerbating her constipation  Currently on Linzess 290 mcg daily but states that she takes the medication 30 minutes after eating breakfast   Was previously on MiraLax but has stopped taking this  She is taking Dulcolax 5 mg tablets but admits to taking up to 10 tablets a day to maintain adequate bowel movements daily  Reports soft stools  Also admits to drinking plenty of water    · Patient is on significant dose of stimulant laxatives which I advised needs to be decreased to a safe dose as soon as possible   · However, in context of MS as well as chronic opioid-induced constipation, patient requires strong bowel regimen to maintain adequate bowel habits  · Instructed patient to continue taking Linzess 290 mcg daily but advised her to take the medication 10-15 minutes before breakfast to help maximize the medications affect; she can not even take the medication with meals initially to help move bowels and then gradually titrate to 50 minutes before breakfast if needed   · Advised patient to start decreasing the amount of Dulcolax that she is taking daily  · In context of chronic opioid use due to MS and significant opioid-induced constipation requiring high doses of Dulcolax, will start the patient on Movantik 25 mg daily  · If unable to tolerate the full dose of Movantik, can decrease to 12 5 mg daily  · Also advised patient to supplement fiber in her diet; she can supplement soluble fiber with the for Benefiber available over-the-counter or fiber gummies with a goal of 20-25 g of fiber daily   · I have also advised patient that she can use MiraLax 1 to 2 times a day as needed  · Encouraged continued hydration  · Plan for colonoscopy for positive Cologuard and incomplete prep previously as noted below  · Follow-up in 8 weeks         Relevant Medications    naloxegol oxalate (Movantik) 25 MG tablet    Other Relevant Orders    Colonoscopy       Nervous and Auditory    Multiple sclerosis ([address] )     Constipation certainly exacerbated by her MS and current need for chronic opioid use  · Continue management of MS and narcotic pain regimen by her PCP and other specialists  · Management for opioid-induced constipation as noted above            Other    Continuous opioid dependence (HCC)    Relevant Medications    naloxegol oxalate (Movantik) 25 MG tablet    Positive colorectal cancer screening using Cologuard test     Previous attempt at colonoscopy in September 2021 unfortunately with inadequate prep  Patient has subsequently had Cologuard testing in November of 2021 which was positive  Patient requires repeat colonoscopy  · Will attempt to schedule patient colonoscopy within the next couple of weeks in setting of her positive Cologuard test  · Patient will require extended bowel prep; will provide patient with samples of Plenvu from the office x2 days  · Bowel prep instructions provided  · Will plan for colonoscopy to be done in the hospital setting due to [address] and other concurrent medical problems         Relevant Orders    Colonoscopy      Other Visit Diagnoses     Screening for colon cancer        Relevant Orders    Colonoscopy        Orders Placed This Encounter   Procedures    Colonoscopy     Standing Status:   Future     Standing Expiration Date:   2/23/2023     Order Specific Question:   Appointment priority classification? Answer:   Level 3 - Outpatient Suggested within 4 weeks     Order Specific Question:   Requested date?      Answer:   3/24/2022     Order Specific Question:   Requested Site     Answer:   [facility]     Order Specific Question:   Performing Location     Answer:   Endoscopy Dept     Order Specific Question:   Are you the performing provider? Answer:   No     Order Specific Question:   Performing Physician     Answer:   Morgan Gamino [[id number]]     Order Specific Question:   Anesthesia required? Answer:   Yes     Order Specific Question:   Is this procedure associated with another Endo procedure? Answer:   No     Order Specific Question:   Are you an Ambulatory ? (No Provider Response Needed)     Answer:   Yes     Order Specific Question:   Ready to Schedule? Answer:   No     Order Specific Question:   Has there been a change from the initial order? Answer:   No     ______________________________________________________________________    HPI:  68-year-old female presents to GI office for follow-up for drug-induced constipation, positive Cologuard test   Patient carries diagnosis of advanced MS and is largely wheelchair bound  She has assistance with much of her activities of daily living by her  who is present in the room during the encounter  Patient has had longstanding issues with opioid-induced constipation and has been on various bowel regimen medications  She is currently taking Linzess 290 mcg daily but states that she takes the medication 30 minutes after eating breakfast   She also admits to taking up to 10 tablets of 5 mg Dulcolax every day  She has been taking this medication dose for several weeks now  With the aforementioned regimen, patient states that she has bowel movement daily consisting of soft stool  She intermittently has some blood in her stool that is described as red  Denies any significant unintentional weight loss, melena, dysphagia, odynophagia, nausea, vomiting  She does have some right lower quadrant abdominal pain which has also been going on for several months  She had previously taken MiraLax as well but has stopped taking this  She does not take any fiber supplements  Reports drinking plenty of water  She is not aware of any significant family history due to both her parents passing away at an early age  Her 1 living sister is otherwise healthy      Last EGD:  None  Last colonoscopy:  09/2021    REVIEW OF SYSTEMS:    CONSTITUTIONAL: Denies any fever, chills, rigors, and weight loss  HEENT: No earache or tinnitus, denies hearing loss or visual disturbances  CARDIOVASCULAR: No chest pain or palpitations   RESPIRATORY: Denies any cough, hemoptysis, shortness of breath or dyspnea on exertion  GASTROINTESTINAL: As noted in the History of Present Illness   GENITOURINARY: No problems with urination, denies any hematuria or dysuria  NEUROLOGIC: No dizziness or vertigo, denies headaches   MUSCULOSKELETAL: +muscle/joint pain (due to MS)  SKIN: Denies skin rashes or itching   ENDOCRINE: Denies excessive thirst, denies intolerance to heat or cold  PSYCHOSOCIAL: Denies depression or anxiety    Historical Information   Past Medical History:   Diagnosis Date    Acute cystitis     Anxiety     Arthritis     Cauda equina syndrome with neurogenic bladder (HCC)     Disease of thyroid gland     Dysuria     Multiple sclerosis (HCC)     Neurogenic bladder     Nocturia     Rheumatoid arthritis (HCC)     UTI (urinary tract infection)      Past Surgical History:   Procedure Laterality Date    BREAST EXCISIONAL BIOPSY Bilateral     greater than 12 years ago    CYSTOSCOPY  2012    KNEE SURGERY       Social History   Social History     Substance and Sexual Activity   Alcohol Use No     Social History     Substance and Sexual Activity   Drug Use No     Social History     Tobacco Use   Smoking Status Former Smoker    Packs/day: 0 25    Years: 3 00    Pack years: 0 75    Start date: 7/27/2005   Smokeless Tobacco Never Used     Family History   Adopted: Yes   Problem Relation Age of Onset    No Known Problems Mother     No Known Problems Father        MEDICATIONS & ALLERGIES:    Current Outpatient Medications   Medication Instructions    alendronate (FOSAMAX) 70 mg, Oral, Every 7 days, Take with full glass water, on empty stomach, do not eat or lay down for 30 min   ALPRAZolam (XANAX) 1 mg, Oral, 2 times daily PRN    aspirin-dipyridamole (AGGRENOX)  mg per 12 hr capsule 1 capsule, Oral, Every 12 hours scheduled    baclofen 10 mg, Oral, 4 times daily    bisacodyl (DULCOLAX) 5 mg EC tablet Please follow prep instructions provided by the office   buPROPion (WELLBUTRIN SR) 200 MG 12 hr tablet 1 tablet, Oral, 2 times daily    carBAMazepine (TEGRETOL XR) 200 mg, Oral, 2 times daily    Cholecalciferol 2000 units CAPS 1 capsule    ergocalciferol (VITAMIN D2) 50,000 Units, Oral, Weekly    levothyroxine 200 mcg tablet TAKE 1 TABLET BY MOUTH  DAILY    LINZESS 290 MCG CAPS 1 capsule, Daily    methadone (DOLOPHINE) 10 mg tablet Take two tabs by mouth three times daily and three tabs at bedtime  Long term medication,    methylphenidate (RITALIN) 20 MG tablet Three tabs daily - Long term medication    morphine (MS CONTIN) 60 mg 12 hr tablet 2 tabs in am; 1 tab mid-morning; 1 tab mid afternoon; 2 tabs at bedtime  Long term medication    Movantik 25 mg, Oral, Daily    Nutritional Supplements (ProSource No Carb) LIQD 30 mL, Oral, Daily    pantoprazole (PROTONIX) 40 mg, Oral, Daily    PARoxetine (PAXIL) 40 mg, Oral, Daily    polyethylene glycol (GLYCOLAX) 17 GM/SCOOP powder Take as directed by the office   pregabalin (LYRICA) 50 mg, Oral, 2 times daily    prochlorperazine (COMPAZINE) 25 mg, Rectal, Daily PRN    promethazine (PHENERGAN) 25 mg, Rectal, Every 6 hours PRN    promethazine (PHENERGAN) 25 mg, Oral, Every 6 hours PRN    traZODone (DESYREL) 150 mg, Oral, Daily at bedtime     Allergies   Allergen Reactions    Codeine      Other reaction(s): Other (See Comments)  can take morphine  Other reaction(s):  Other (See Comments)  can take morphine  Can take morphine    Penicillins        PHYSICAL EXAM:      Objective   Blood pressure 143/82, temperature 99 8 °F (37 7 °C), temperature source Tympanic, height 5' 11" (1 803 m), weight 89 8 kg (198 lb)  Body mass index is 27 62 kg/m²  General Appearance:   Alert, cooperative, no distress; chronically ill-appearing, in wheelchair   HEENT:   Normocephalic, atraumatic, anicteric     Neck:   Supple, symmetrical, trachea midline   Lungs:   Equal chest rise, respirations unlabored    Heart:   Regular rate and rhythm   Abdomen:   Soft, mild discomfort on palpation in the RLQ, non-distended; normal bowel sounds; no masses, no organomegaly    Rectal:   Deferred    Extremities:   No cyanosis, clubbing or edema    Skin:   No jaundice, rashes, or lesions      LAB RESULTS:     No visits with results within 1 Day(s) from this visit  Latest known visit with results is:   Orders Only on 09/28/2021   Component Date Value    Cologuard Result 11/18/2021 Positive*       RADIOLOGY RESULTS: I have personally reviewed pertinent imaging studies  DAVID Zarate  Gastroenterology Fellow  [facility]  Available on [email or web address]

## 2022-03-30 ENCOUNTER — TELEPHONE (OUTPATIENT)
Dept: GASTROENTEROLOGY | Facility: CLINIC | Age: 72
End: 2022-03-30

## 2022-03-30 NOTE — TELEPHONE ENCOUNTER
Patients GI provider:  Dr Shante Singh    Number to return call: [phone]    Reason for call: Pt's  called to reschedule pt's procedure       Scheduled procedure/appointment date if applicable: Apt/procedure NA Surgeon (Optional): LYLE Dougherty Accession #: [id number] Size Of Lesion In Cm: 1 X Size Of Lesion In Cm (Optional): 1.2 Size Of Margin In Cm: 0.3 Excision Method: Fusiform Anesthesia Volume In Cc: 3 Did You Provide Opioid Counseling: No Repair Type: Intermediate Intermediate / Complex Repair - Final Wound Length In Cm: 5.3 Undermining Type: Entire Wound Debridement Text: The wound edges were debrided prior to proceeding with the closure to facilitate wound healing. Helical Rim Text: The closure involved the helical rim. Vermilion Border Text: The closure involved the vermilion border. Nostril Rim Text: The closure involved the nostril rim. Retention Suture Text: Retention sutures were placed to support the closure and prevent dehiscence. Primary Defect Length (In Cm): 0 Suture Removal: 14 days Lab: [id number] Lab Facility: [id number] Graft Donor Site Bandage (Optional-Leave Blank If You Don't Want In Note): Steri-strips and a pressure bandage were applied to the donor site. Epidermal Closure Graft Donor Site (Optional): simple interrupted Billing Type: Third-Party Bill Excision Depth: adipose tissue Scalpel Size: 15 blade Anesthesia Type: 2% lidocaine with epinephrine Hemostasis: Electrocautery Estimated Blood Loss (Cc): minimal Detail Level: Detailed Undermining Location (Optional): at the junction of the superficial and deep fat Deep Sutures: 4-0 Polysorb Epidermal Sutures: 4-0 Prolene Epidermal Closure: running and interrupted Wound Care: Bacitracin Dressing: dry sterile dressing Positioning (Leave Blank If You Do Not Want): The patient was placed in a comfortable position exposing the surgical site. Pre-Excision Curettage Text (Leave Blank If You Do Not Want): Prior to drawing the surgical margin the visible lesion was removed with electrodesiccation and curettage to clearly define the lesion size. Complex Repair Preamble Text (Leave Blank If You Do Not Want): Extensive wide undermining was performed. Intermediate Repair Preamble Text (Leave Blank If You Do Not Want): Undermining was performed with blunt dissection. Curvilinear Excision Additional Text (Leave Blank If You Do Not Want): The margin was drawn around the clinically apparent lesion.  A curvilinear shape was then drawn on the skin incorporating the lesion and margins.  Incisions were then made along these lines to the appropriate tissue plane and the lesion was extirpated. Fusiform Excision Additional Text (Leave Blank If You Do Not Want): The margin was drawn around the clinically apparent lesion.  A fusiform shape was then drawn on the skin incorporating the lesion and margins.  Incisions were then made along these lines to the appropriate tissue plane and the lesion was extirpated. Elliptical Excision Additional Text (Leave Blank If You Do Not Want): The margin was drawn around the clinically apparent lesion.  An elliptical shape was then drawn on the skin incorporating the lesion and margins.  Incisions were then made along these lines to the appropriate tissue plane and the lesion was extirpated. Saucerization Excision Additional Text (Leave Blank If You Do Not Want): The margin was drawn around the clinically apparent lesion.  Incisions were then made along these lines, in a tangential fashion, to the appropriate tissue plane and the lesion was extirpated. Slit Excision Additional Text (Leave Blank If You Do Not Want): A linear line was drawn on the skin overlying the lesion. An incision was made slowly until the lesion was visualized.  Once visualized, the lesion was removed with blunt dissection. Excisional Biopsy Additional Text (Leave Blank If You Do Not Want): The margin was drawn around the clinically apparent lesion. An elliptical shape was then drawn on the skin incorporating the lesion and margins.  Incisions were then made along these lines to the appropriate tissue plane and the lesion was extirpated. Perilesional Excision Additional Text (Leave Blank If You Do Not Want): The margin was drawn around the clinically apparent lesion. Incisions were then made along these lines to the appropriate tissue plane and the lesion was extirpated. Repair Performed By Another Provider Text (Leave Blank If You Do Not Want): After the tissue was excised the defect was repaired by another provider. No Repair - Repaired With Adjacent Surgical Defect Text (Leave Blank If You Do Not Want): After the excision the defect was repaired concurrently with another surgical defect which was in close approximation. Advancement Flap (Single) Text: The defect edges were debeveled with a #15 scalpel blade.  Given the location of the defect and the proximity to free margins a single advancement flap was deemed most appropriate.  Using a sterile surgical marker, an appropriate advancement flap was drawn incorporating the defect and placing the expected incisions within the relaxed skin tension lines where possible.    The area thus outlined was incised deep to adipose tissue with a #15 scalpel blade.  The skin margins were undermined to an appropriate distance in all directions utilizing iris scissors. Advancement Flap (Double) Text: The defect edges were debeveled with a #15 scalpel blade.  Given the location of the defect and the proximity to free margins a double advancement flap was deemed most appropriate.  Using a sterile surgical marker, the appropriate advancement flaps were drawn incorporating the defect and placing the expected incisions within the relaxed skin tension lines where possible.    The area thus outlined was incised deep to adipose tissue with a #15 scalpel blade.  The skin margins were undermined to an appropriate distance in all directions utilizing iris scissors. Burow's Advancement Flap Text: The defect edges were debeveled with a #15 scalpel blade.  Given the location of the defect and the proximity to free margins a Burow's advancement flap was deemed most appropriate.  Using a sterile surgical marker, the appropriate advancement flap was drawn incorporating the defect and placing the expected incisions within the relaxed skin tension lines where possible.    The area thus outlined was incised deep to adipose tissue with a #15 scalpel blade.  The skin margins were undermined to an appropriate distance in all directions utilizing iris scissors. Chonodrocutaneous Helical Advancement Flap Text: The defect edges were debeveled with a #15 scalpel blade.  Given the location of the defect and the proximity to free margins a chondrocutaneous helical advancement flap was deemed most appropriate.  Using a sterile surgical marker, the appropriate advancement flap was drawn incorporating the defect and placing the expected incisions within the relaxed skin tension lines where possible.    The area thus outlined was incised deep to adipose tissue with a #15 scalpel blade.  The skin margins were undermined to an appropriate distance in all directions utilizing iris scissors. Crescentic Advancement Flap Text: The defect edges were debeveled with a #15 scalpel blade.  Given the location of the defect and the proximity to free margins a crescentic advancement flap was deemed most appropriate.  Using a sterile surgical marker, the appropriate advancement flap was drawn incorporating the defect and placing the expected incisions within the relaxed skin tension lines where possible.    The area thus outlined was incised deep to adipose tissue with a #15 scalpel blade.  The skin margins were undermined to an appropriate distance in all directions utilizing iris scissors. A-T Advancement Flap Text: The defect edges were debeveled with a #15 scalpel blade.  Given the location of the defect, shape of the defect and the proximity to free margins an A-T advancement flap was deemed most appropriate.  Using a sterile surgical marker, an appropriate advancement flap was drawn incorporating the defect and placing the expected incisions within the relaxed skin tension lines where possible.    The area thus outlined was incised deep to adipose tissue with a #15 scalpel blade.  The skin margins were undermined to an appropriate distance in all directions utilizing iris scissors. O-T Advancement Flap Text: The defect edges were debeveled with a #15 scalpel blade.  Given the location of the defect, shape of the defect and the proximity to free margins an O-T advancement flap was deemed most appropriate.  Using a sterile surgical marker, an appropriate advancement flap was drawn incorporating the defect and placing the expected incisions within the relaxed skin tension lines where possible.    The area thus outlined was incised deep to adipose tissue with a #15 scalpel blade.  The skin margins were undermined to an appropriate distance in all directions utilizing iris scissors. O-L Flap Text: The defect edges were debeveled with a #15 scalpel blade.  Given the location of the defect, shape of the defect and the proximity to free margins an O-L flap was deemed most appropriate.  Using a sterile surgical marker, an appropriate advancement flap was drawn incorporating the defect and placing the expected incisions within the relaxed skin tension lines where possible.    The area thus outlined was incised deep to adipose tissue with a #15 scalpel blade.  The skin margins were undermined to an appropriate distance in all directions utilizing iris scissors. O-Z Flap Text: The defect edges were debeveled with a #15 scalpel blade.  Given the location of the defect, shape of the defect and the proximity to free margins an O-Z flap was deemed most appropriate.  Using a sterile surgical marker, an appropriate transposition flap was drawn incorporating the defect and placing the expected incisions within the relaxed skin tension lines where possible. The area thus outlined was incised deep to adipose tissue with a #15 scalpel blade.  The skin margins were undermined to an appropriate distance in all directions utilizing iris scissors. Double O-Z Flap Text: The defect edges were debeveled with a #15 scalpel blade.  Given the location of the defect, shape of the defect and the proximity to free margins a Double O-Z flap was deemed most appropriate.  Using a sterile surgical marker, an appropriate transposition flap was drawn incorporating the defect and placing the expected incisions within the relaxed skin tension lines where possible. The area thus outlined was incised deep to adipose tissue with a #15 scalpel blade.  The skin margins were undermined to an appropriate distance in all directions utilizing iris scissors. V-Y Flap Text: The defect edges were debeveled with a #15 scalpel blade.  Given the location of the defect, shape of the defect and the proximity to free margins a V-Y flap was deemed most appropriate.  Using a sterile surgical marker, an appropriate advancement flap was drawn incorporating the defect and placing the expected incisions within the relaxed skin tension lines where possible.    The area thus outlined was incised deep to adipose tissue with a #15 scalpel blade.  The skin margins were undermined to an appropriate distance in all directions utilizing iris scissors. Advancement-Rotation Flap Text: The defect edges were debeveled with a #15 scalpel blade.  Given the location of the defect, shape of the defect and the proximity to free margins an advancement-rotation flap was deemed most appropriate.  Using a sterile surgical marker, an appropriate flap was drawn incorporating the defect and placing the expected incisions within the relaxed skin tension lines where possible. The area thus outlined was incised deep to adipose tissue with a #15 scalpel blade.  The skin margins were undermined to an appropriate distance in all directions utilizing iris scissors. Mercedes Flap Text: The defect edges were debeveled with a #15 scalpel blade.  Given the location of the defect, shape of the defect and the proximity to free margins a Mercedes flap was deemed most appropriate.  Using a sterile surgical marker, an appropriate advancement flap was drawn incorporating the defect and placing the expected incisions within the relaxed skin tension lines where possible. The area thus outlined was incised deep to adipose tissue with a #15 scalpel blade.  The skin margins were undermined to an appropriate distance in all directions utilizing iris scissors. Modified Advancement Flap Text: The defect edges were debeveled with a #15 scalpel blade.  Given the location of the defect, shape of the defect and the proximity to free margins a modified advancement flap was deemed most appropriate.  Using a sterile surgical marker, an appropriate advancement flap was drawn incorporating the defect and placing the expected incisions within the relaxed skin tension lines where possible.    The area thus outlined was incised deep to adipose tissue with a #15 scalpel blade.  The skin margins were undermined to an appropriate distance in all directions utilizing iris scissors. Mucosal Advancement Flap Text: Given the location of the defect, shape of the defect and the proximity to free margins a mucosal advancement flap was deemed most appropriate. Incisions were made with a 15 blade scalpel in the appropriate fashion along the cutaneous vermilion border and the mucosal lip. The remaining actinically damaged mucosal tissue was excised.  The mucosal advancement flap was then elevated to the gingival sulcus with care taken to preserve the neurovascular structures and advanced into the primary defect. Care was taken to ensure that precise realignment of the vermilion border was achieved. Hatchet Flap Text: The defect edges were debeveled with a #15 scalpel blade.  Given the location of the defect, shape of the defect and the proximity to free margins a hatchet flap was deemed most appropriate.  Using a sterile surgical marker, an appropriate hatchet flap was drawn incorporating the defect and placing the expected incisions within the relaxed skin tension lines where possible.    The area thus outlined was incised deep to adipose tissue with a #15 scalpel blade.  The skin margins were undermined to an appropriate distance in all directions utilizing iris scissors. Rotation Flap Text: The defect edges were debeveled with a #15 scalpel blade.  Given the location of the defect, shape of the defect and the proximity to free margins a rotation flap was deemed most appropriate.  Using a sterile surgical marker, an appropriate rotation flap was drawn incorporating the defect and placing the expected incisions within the relaxed skin tension lines where possible.    The area thus outlined was incised deep to adipose tissue with a #15 scalpel blade.  The skin margins were undermined to an appropriate distance in all directions utilizing iris scissors. Spiral Flap Text: The defect edges were debeveled with a #15 scalpel blade.  Given the location of the defect, shape of the defect and the proximity to free margins a spiral flap was deemed most appropriate.  Using a sterile surgical marker, an appropriate rotation flap was drawn incorporating the defect and placing the expected incisions within the relaxed skin tension lines where possible. The area thus outlined was incised deep to adipose tissue with a #15 scalpel blade.  The skin margins were undermined to an appropriate distance in all directions utilizing iris scissors. Star Wedge Flap Text: The defect edges were debeveled with a #15 scalpel blade.  Given the location of the defect, shape of the defect and the proximity to free margins a star wedge flap was deemed most appropriate.  Using a sterile surgical marker, an appropriate rotation flap was drawn incorporating the defect and placing the expected incisions within the relaxed skin tension lines where possible. The area thus outlined was incised deep to adipose tissue with a #15 scalpel blade.  The skin margins were undermined to an appropriate distance in all directions utilizing iris scissors. Transposition Flap Text: The defect edges were debeveled with a #15 scalpel blade.  Given the location of the defect and the proximity to free margins a transposition flap was deemed most appropriate.  Using a sterile surgical marker, an appropriate transposition flap was drawn incorporating the defect.    The area thus outlined was incised deep to adipose tissue with a #15 scalpel blade.  The skin margins were undermined to an appropriate distance in all directions utilizing iris scissors. Muscle Hinge Flap Text: The defect edges were debeveled with a #15 scalpel blade.  Given the size, depth and location of the defect and the proximity to free margins a muscle hinge flap was deemed most appropriate.  Using a sterile surgical marker, an appropriate hinge flap was drawn incorporating the defect. The area thus outlined was incised with a #15 scalpel blade.  The skin margins were undermined to an appropriate distance in all directions utilizing iris scissors. Melolabial Transposition Flap Text: The defect edges were debeveled with a #15 scalpel blade.  Given the location of the defect and the proximity to free margins a melolabial flap was deemed most appropriate.  Using a sterile surgical marker, an appropriate melolabial transposition flap was drawn incorporating the defect.    The area thus outlined was incised deep to adipose tissue with a #15 scalpel blade.  The skin margins were undermined to an appropriate distance in all directions utilizing iris scissors. Rhombic Flap Text: The defect edges were debeveled with a #15 scalpel blade.  Given the location of the defect and the proximity to free margins a rhombic flap was deemed most appropriate.  Using a sterile surgical marker, an appropriate rhombic flap was drawn incorporating the defect.    The area thus outlined was incised deep to adipose tissue with a #15 scalpel blade.  The skin margins were undermined to an appropriate distance in all directions utilizing iris scissors. Rhomboid Transposition Flap Text: The defect edges were debeveled with a #15 scalpel blade.  Given the location of the defect and the proximity to free margins a rhomboid transposition flap was deemed most appropriate.  Using a sterile surgical marker, an appropriate rhomboid flap was drawn incorporating the defect.    The area thus outlined was incised deep to adipose tissue with a #15 scalpel blade.  The skin margins were undermined to an appropriate distance in all directions utilizing iris scissors. Bi-Rhombic Flap Text: The defect edges were debeveled with a #15 scalpel blade.  Given the location of the defect and the proximity to free margins a bi-rhombic flap was deemed most appropriate.  Using a sterile surgical marker, an appropriate rhombic flap was drawn incorporating the defect. The area thus outlined was incised deep to adipose tissue with a #15 scalpel blade.  The skin margins were undermined to an appropriate distance in all directions utilizing iris scissors. Helical Rim Advancement Flap Text: The defect edges were debeveled with a #15 blade scalpel.  Given the location of the defect and the proximity to free margins (helical rim) a double helical rim advancement flap was deemed most appropriate.  Using a sterile surgical marker, the appropriate advancement flaps were drawn incorporating the defect and placing the expected incisions between the helical rim and antihelix where possible.  The area thus outlined was incised through and through with a #15 scalpel blade.  With a skin hook and iris scissors, the flaps were gently and sharply undermined and freed up. Bilateral Helical Rim Advancement Flap Text: The defect edges were debeveled with a #15 blade scalpel.  Given the location of the defect and the proximity to free margins (helical rim) a bilateral helical rim advancement flap was deemed most appropriate.  Using a sterile surgical marker, the appropriate advancement flaps were drawn incorporating the defect and placing the expected incisions between the helical rim and antihelix where possible.  The area thus outlined was incised through and through with a #15 scalpel blade.  With a skin hook and iris scissors, the flaps were gently and sharply undermined and freed up. Ear Star Wedge Flap Text: The defect edges were debeveled with a #15 blade scalpel.  Given the location of the defect and the proximity to free margins (helical rim) an ear star wedge flap was deemed most appropriate.  Using a sterile surgical marker, the appropriate flap was drawn incorporating the defect and placing the expected incisions between the helical rim and antihelix where possible.  The area thus outlined was incised through and through with a #15 scalpel blade. Banner Transposition Flap Text: The defect edges were debeveled with a #15 scalpel blade.  Given the location of the defect and the proximity to free margins a Banner transposition flap was deemed most appropriate.  Using a sterile surgical marker, an appropriate flap drawn around the defect. The area thus outlined was incised deep to adipose tissue with a #15 scalpel blade.  The skin margins were undermined to an appropriate distance in all directions utilizing iris scissors. Bilobed Flap Text: The defect edges were debeveled with a #15 scalpel blade.  Given the location of the defect and the proximity to free margins a bilobe flap was deemed most appropriate.  Using a sterile surgical marker, an appropriate bilobe flap drawn around the defect.    The area thus outlined was incised deep to adipose tissue with a #15 scalpel blade.  The skin margins were undermined to an appropriate distance in all directions utilizing iris scissors. Bilobed Transposition Flap Text: The defect edges were debeveled with a #15 scalpel blade.  Given the location of the defect and the proximity to free margins a bilobed transposition flap was deemed most appropriate.  Using a sterile surgical marker, an appropriate bilobe flap drawn around the defect.    The area thus outlined was incised deep to adipose tissue with a #15 scalpel blade.  The skin margins were undermined to an appropriate distance in all directions utilizing iris scissors. Trilobed Flap Text: The defect edges were debeveled with a #15 scalpel blade.  Given the location of the defect and the proximity to free margins a trilobed flap was deemed most appropriate.  Using a sterile surgical marker, an appropriate trilobed flap drawn around the defect.    The area thus outlined was incised deep to adipose tissue with a #15 scalpel blade.  The skin margins were undermined to an appropriate distance in all directions utilizing iris scissors. Dorsal Nasal Flap Text: The defect edges were debeveled with a #15 scalpel blade.  Given the location of the defect and the proximity to free margins a dorsal nasal flap was deemed most appropriate.  Using a sterile surgical marker, an appropriate dorsal nasal flap was drawn around the defect.    The area thus outlined was incised deep to adipose tissue with a #15 scalpel blade.  The skin margins were undermined to an appropriate distance in all directions utilizing iris scissors. Island Pedicle Flap Text: The defect edges were debeveled with a #15 scalpel blade.  Given the location of the defect, shape of the defect and the proximity to free margins an island pedicle advancement flap was deemed most appropriate.  Using a sterile surgical marker, an appropriate advancement flap was drawn incorporating the defect, outlining the appropriate donor tissue and placing the expected incisions within the relaxed skin tension lines where possible.    The area thus outlined was incised deep to adipose tissue with a #15 scalpel blade.  The skin margins were undermined to an appropriate distance in all directions around the primary defect and laterally outward around the island pedicle utilizing iris scissors.  There was minimal undermining beneath the pedicle flap. Island Pedicle Flap With Canthal Suspension Text: The defect edges were debeveled with a #15 scalpel blade.  Given the location of the defect, shape of the defect and the proximity to free margins an island pedicle advancement flap was deemed most appropriate.  Using a sterile surgical marker, an appropriate advancement flap was drawn incorporating the defect, outlining the appropriate donor tissue and placing the expected incisions within the relaxed skin tension lines where possible. The area thus outlined was incised deep to adipose tissue with a #15 scalpel blade.  The skin margins were undermined to an appropriate distance in all directions around the primary defect and laterally outward around the island pedicle utilizing iris scissors.  There was minimal undermining beneath the pedicle flap. A suspension suture was placed in the canthal tendon to prevent tension and prevent ectropion. Alar Island Pedicle Flap Text: The defect edges were debeveled with a #15 scalpel blade.  Given the location of the defect, shape of the defect and the proximity to the alar rim an island pedicle advancement flap was deemed most appropriate.  Using a sterile surgical marker, an appropriate advancement flap was drawn incorporating the defect, outlining the appropriate donor tissue and placing the expected incisions within the nasal ala running parallel to the alar rim. The area thus outlined was incised with a #15 scalpel blade.  The skin margins were undermined minimally to an appropriate distance in all directions around the primary defect and laterally outward around the island pedicle utilizing iris scissors.  There was minimal undermining beneath the pedicle flap. Double Island Pedicle Flap Text: The defect edges were debeveled with a #15 scalpel blade.  Given the location of the defect, shape of the defect and the proximity to free margins a double island pedicle advancement flap was deemed most appropriate.  Using a sterile surgical marker, an appropriate advancement flap was drawn incorporating the defect, outlining the appropriate donor tissue and placing the expected incisions within the relaxed skin tension lines where possible.    The area thus outlined was incised deep to adipose tissue with a #15 scalpel blade.  The skin margins were undermined to an appropriate distance in all directions around the primary defect and laterally outward around the island pedicle utilizing iris scissors.  There was minimal undermining beneath the pedicle flap. Island Pedicle Flap-Requiring Vessel Identification Text: The defect edges were debeveled with a #15 scalpel blade.  Given the location of the defect, shape of the defect and the proximity to free margins an island pedicle advancement flap was deemed most appropriate.  Using a sterile surgical marker, an appropriate advancement flap was drawn, based on the axial vessel mentioned above, incorporating the defect, outlining the appropriate donor tissue and placing the expected incisions within the relaxed skin tension lines where possible.    The area thus outlined was incised deep to adipose tissue with a #15 scalpel blade.  The skin margins were undermined to an appropriate distance in all directions around the primary defect and laterally outward around the island pedicle utilizing iris scissors.  There was minimal undermining beneath the pedicle flap. Keystone Flap Text: The defect edges were debeveled with a #15 scalpel blade.  Given the location of the defect, shape of the defect a keystone flap was deemed most appropriate.  Using a sterile surgical marker, an appropriate keystone flap was drawn incorporating the defect, outlining the appropriate donor tissue and placing the expected incisions within the relaxed skin tension lines where possible. The area thus outlined was incised deep to adipose tissue with a #15 scalpel blade.  The skin margins were undermined to an appropriate distance in all directions around the primary defect and laterally outward around the flap utilizing iris scissors. O-T Plasty Text: The defect edges were debeveled with a #15 scalpel blade.  Given the location of the defect, shape of the defect and the proximity to free margins an O-T plasty was deemed most appropriate.  Using a sterile surgical marker, an appropriate O-T plasty was drawn incorporating the defect and placing the expected incisions within the relaxed skin tension lines where possible.    The area thus outlined was incised deep to adipose tissue with a #15 scalpel blade.  The skin margins were undermined to an appropriate distance in all directions utilizing iris scissors. O-Z Plasty Text: The defect edges were debeveled with a #15 scalpel blade.  Given the location of the defect, shape of the defect and the proximity to free margins an O-Z plasty (double transposition flap) was deemed most appropriate.  Using a sterile surgical marker, the appropriate transposition flaps were drawn incorporating the defect and placing the expected incisions within the relaxed skin tension lines where possible.    The area thus outlined was incised deep to adipose tissue with a #15 scalpel blade.  The skin margins were undermined to an appropriate distance in all directions utilizing iris scissors.  Hemostasis was achieved with electrocautery.  The flaps were then transposed into place, one clockwise and the other counterclockwise, and anchored with interrupted buried subcutaneous sutures. Double O-Z Plasty Text: The defect edges were debeveled with a #15 scalpel blade.  Given the location of the defect, shape of the defect and the proximity to free margins a Double O-Z plasty (double transposition flap) was deemed most appropriate.  Using a sterile surgical marker, the appropriate transposition flaps were drawn incorporating the defect and placing the expected incisions within the relaxed skin tension lines where possible. The area thus outlined was incised deep to adipose tissue with a #15 scalpel blade.  The skin margins were undermined to an appropriate distance in all directions utilizing iris scissors.  Hemostasis was achieved with electrocautery.  The flaps were then transposed into place, one clockwise and the other counterclockwise, and anchored with interrupted buried subcutaneous sutures. S Plasty Text: Given the location and shape of the defect, and the orientation of relaxed skin tension lines, an S-plasty was deemed most appropriate for repair.  Using a sterile surgical marker, the appropriate outline of the S-plasty was drawn, incorporating the defect and placing the expected incisions within the relaxed skin tension lines where possible.  The area thus outlined was incised deep to adipose tissue with a #15 scalpel blade.  The skin margins were undermined to an appropriate distance in all directions utilizing iris scissors. The skin flaps were advanced over the defect.  The opposing margins were then approximated with interrupted buried subcutaneous sutures. V-Y Plasty Text: The defect edges were debeveled with a #15 scalpel blade.  Given the location of the defect, shape of the defect and the proximity to free margins an V-Y advancement flap was deemed most appropriate.  Using a sterile surgical marker, an appropriate advancement flap was drawn incorporating the defect and placing the expected incisions within the relaxed skin tension lines where possible.    The area thus outlined was incised deep to adipose tissue with a #15 scalpel blade.  The skin margins were undermined to an appropriate distance in all directions utilizing iris scissors. H Plasty Text: Given the location of the defect, shape of the defect and the proximity to free margins a H-plasty was deemed most appropriate for repair.  Using a sterile surgical marker, the appropriate advancement arms of the H-plasty were drawn incorporating the defect and placing the expected incisions within the relaxed skin tension lines where possible. The area thus outlined was incised deep to adipose tissue with a #15 scalpel blade. The skin margins were undermined to an appropriate distance in all directions utilizing iris scissors.  The opposing advancement arms were then advanced into place in opposite direction and anchored with interrupted buried subcutaneous sutures. W Plasty Text: The lesion was extirpated to the level of the fat with a #15 scalpel blade.  Given the location of the defect, shape of the defect and the proximity to free margins a W-plasty was deemed most appropriate for repair.  Using a sterile surgical marker, the appropriate transposition arms of the W-plasty were drawn incorporating the defect and placing the expected incisions within the relaxed skin tension lines where possible.    The area thus outlined was incised deep to adipose tissue with a #15 scalpel blade.  The skin margins were undermined to an appropriate distance in all directions utilizing iris scissors.  The opposing transposition arms were then transposed into place in opposite direction and anchored with interrupted buried subcutaneous sutures. Z Plasty Text: The lesion was extirpated to the level of the fat with a #15 scalpel blade.  Given the location of the defect, shape of the defect and the proximity to free margins a Z-plasty was deemed most appropriate for repair.  Using a sterile surgical marker, the appropriate transposition arms of the Z-plasty were drawn incorporating the defect and placing the expected incisions within the relaxed skin tension lines where possible.    The area thus outlined was incised deep to adipose tissue with a #15 scalpel blade.  The skin margins were undermined to an appropriate distance in all directions utilizing iris scissors.  The opposing transposition arms were then transposed into place in opposite direction and anchored with interrupted buried subcutaneous sutures. Cheek Interpolation Flap Text: A decision was made to reconstruct the defect utilizing an interpolation axial flap and a staged reconstruction.  A telfa template was made of the defect.  This telfa template was then used to outline the Cheek Interpolation flap.  The donor area for the pedicle flap was then injected with anesthesia.  The flap was excised through the skin and subcutaneous tissue down to the layer of the underlying musculature.  The interpolation flap was carefully excised within this deep plane to maintain its blood supply.  The edges of the donor site were undermined.   The donor site was closed in a primary fashion.  The pedicle was then rotated into position and sutured.  Once the tube was sutured into place, adequate blood supply was confirmed with blanching and refill.  The pedicle was then wrapped with xeroform gauze and dressed appropriately with a telfa and gauze bandage to ensure continued blood supply and protect the attached pedicle. Cheek-To-Nose Interpolation Flap Text: A decision was made to reconstruct the defect utilizing an interpolation axial flap and a staged reconstruction.  A telfa template was made of the defect.  This telfa template was then used to outline the Cheek-To-Nose Interpolation flap.  The donor area for the pedicle flap was then injected with anesthesia.  The flap was excised through the skin and subcutaneous tissue down to the layer of the underlying musculature.  The interpolation flap was carefully excised within this deep plane to maintain its blood supply.  The edges of the donor site were undermined.   The donor site was closed in a primary fashion.  The pedicle was then rotated into position and sutured.  Once the tube was sutured into place, adequate blood supply was confirmed with blanching and refill.  The pedicle was then wrapped with xeroform gauze and dressed appropriately with a telfa and gauze bandage to ensure continued blood supply and protect the attached pedicle. Interpolation Flap Text: A decision was made to reconstruct the defect utilizing an interpolation axial flap and a staged reconstruction.  A telfa template was made of the defect.  This telfa template was then used to outline the interpolation flap.  The donor area for the pedicle flap was then injected with anesthesia.  The flap was excised through the skin and subcutaneous tissue down to the layer of the underlying musculature.  The interpolation flap was carefully excised within this deep plane to maintain its blood supply.  The edges of the donor site were undermined.   The donor site was closed in a primary fashion.  The pedicle was then rotated into position and sutured.  Once the tube was sutured into place, adequate blood supply was confirmed with blanching and refill.  The pedicle was then wrapped with xeroform gauze and dressed appropriately with a telfa and gauze bandage to ensure continued blood supply and protect the attached pedicle. Melolabial Interpolation Flap Text: A decision was made to reconstruct the defect utilizing an interpolation axial flap and a staged reconstruction.  A telfa template was made of the defect.  This telfa template was then used to outline the melolabial interpolation flap.  The donor area for the pedicle flap was then injected with anesthesia.  The flap was excised through the skin and subcutaneous tissue down to the layer of the underlying musculature.  The pedicle flap was carefully excised within this deep plane to maintain its blood supply.  The edges of the donor site were undermined.   The donor site was closed in a primary fashion.  The pedicle was then rotated into position and sutured.  Once the tube was sutured into place, adequate blood supply was confirmed with blanching and refill.  The pedicle was then wrapped with xeroform gauze and dressed appropriately with a telfa and gauze bandage to ensure continued blood supply and protect the attached pedicle. Mastoid Interpolation Flap Text: A decision was made to reconstruct the defect utilizing an interpolation axial flap and a staged reconstruction.  A telfa template was made of the defect.  This telfa template was then used to outline the mastoid interpolation flap.  The donor area for the pedicle flap was then injected with anesthesia.  The flap was excised through the skin and subcutaneous tissue down to the layer of the underlying musculature.  The pedicle flap was carefully excised within this deep plane to maintain its blood supply.  The edges of the donor site were undermined.   The donor site was closed in a primary fashion.  The pedicle was then rotated into position and sutured.  Once the tube was sutured into place, adequate blood supply was confirmed with blanching and refill.  The pedicle was then wrapped with xeroform gauze and dressed appropriately with a telfa and gauze bandage to ensure continued blood supply and protect the attached pedicle. Posterior Auricular Interpolation Flap Text: A decision was made to reconstruct the defect utilizing an interpolation axial flap and a staged reconstruction.  A telfa template was made of the defect.  This telfa template was then used to outline the posterior auricular interpolation flap.  The donor area for the pedicle flap was then injected with anesthesia.  The flap was excised through the skin and subcutaneous tissue down to the layer of the underlying musculature.  The pedicle flap was carefully excised within this deep plane to maintain its blood supply.  The edges of the donor site were undermined.   The donor site was closed in a primary fashion.  The pedicle was then rotated into position and sutured.  Once the tube was sutured into place, adequate blood supply was confirmed with blanching and refill.  The pedicle was then wrapped with xeroform gauze and dressed appropriately with a telfa and gauze bandage to ensure continued blood supply and protect the attached pedicle. Paramedian Forehead Flap Text: A decision was made to reconstruct the defect utilizing an interpolation axial flap and a staged reconstruction.  A telfa template was made of the defect.  This telfa template was then used to outline the paramedian forehead pedicle flap.  The donor area for the pedicle flap was then injected with anesthesia.  The flap was excised through the skin and subcutaneous tissue down to the layer of the underlying musculature.  The pedicle flap was carefully excised within this deep plane to maintain its blood supply.  The edges of the donor site were undermined.   The donor site was closed in a primary fashion.  The pedicle was then rotated into position and sutured.  Once the tube was sutured into place, adequate blood supply was confirmed with blanching and refill.  The pedicle was then wrapped with xeroform gauze and dressed appropriately with a telfa and gauze bandage to ensure continued blood supply and protect the attached pedicle. Lip Wedge Excision Repair Text: Given the location of the defect and the proximity to free margins a full thickness wedge repair was deemed most appropriate.  Using a sterile surgical marker, the appropriate repair was drawn incorporating the defect and placing the expected incisions perpendicular to the vermilion border.  The vermilion border was also meticulously outlined to ensure appropriate reapproximation during the repair.  The area thus outlined was incised through and through with a #15 scalpel blade.  The muscularis and dermis were reaproximated with deep sutures following hemostasis. Care was taken to realign the vermilion border before proceeding with the superficial closure.  Once the vermilion was realigned the superfical and mucosal closure was finished. Ftsg Text: The defect edges were debeveled with a #15 scalpel blade.  Given the location of the defect, shape of the defect and the proximity to free margins a full thickness skin graft was deemed most appropriate.  Using a sterile surgical marker, the primary defect shape was transferred to the donor site. The area thus outlined was incised deep to adipose tissue with a #15 scalpel blade.  The harvested graft was then trimmed of adipose tissue until only dermis and epidermis was left.  The skin margins of the secondary defect were undermined to an appropriate distance in all directions utilizing iris scissors.  The secondary defect was closed with interrupted buried subcutaneous sutures.  The skin edges were then re-apposed with running  sutures.  The skin graft was then placed in the primary defect and oriented appropriately. Split-Thickness Skin Graft Text: The defect edges were debeveled with a #15 scalpel blade.  Given the location of the defect, shape of the defect and the proximity to free margins a split thickness skin graft was deemed most appropriate.  Using a sterile surgical marker, the primary defect shape was transferred to the donor site. The split thickness graft was then harvested.  The skin graft was then placed in the primary defect and oriented appropriately. Cartilage Graft Text: The defect edges were debeveled with a #15 scalpel blade.  Given the location of the defect, shape of the defect, the fact the defect involved a full thickness cartilage defect a cartilage graft was deemed most appropriate.  An appropriate donor site was identified, cleansed, and anesthetized. The cartilage graft was then harvested and transferred to the recipient site, oriented appropriately and then sutured into place.  The secondary defect was then repaired using a primary closure. Composite Graft Text: The defect edges were debeveled with a #15 scalpel blade.  Given the location of the defect, shape of the defect, the proximity to free margins and the fact the defect was full thickness a composite graft was deemed most appropriate.  The defect was outline and then transferred to the donor site.  A full thickness graft was then excised from the donor site. The graft was then placed in the primary defect, oriented appropriately and then sutured into place.  The secondary defect was then repaired using a primary closure. Epidermal Autograft Text: The defect edges were debeveled with a #15 scalpel blade.  Given the location of the defect, shape of the defect and the proximity to free margins an epidermal autograft was deemed most appropriate.  Using a sterile surgical marker, the primary defect shape was transferred to the donor site. The epidermal graft was then harvested.  The skin graft was then placed in the primary defect and oriented appropriately. Dermal Autograft Text: The defect edges were debeveled with a #15 scalpel blade.  Given the location of the defect, shape of the defect and the proximity to free margins a dermal autograft was deemed most appropriate.  Using a sterile surgical marker, the primary defect shape was transferred to the donor site. The area thus outlined was incised deep to adipose tissue with a #15 scalpel blade.  The harvested graft was then trimmed of adipose and epidermal tissue until only dermis was left.  The skin graft was then placed in the primary defect and oriented appropriately. Skin Substitute Text: The defect edges were debeveled with a #15 scalpel blade.  Given the location of the defect, shape of the defect and the proximity to free margins a skin substitute graft was deemed most appropriate.  The graft material was trimmed to fit the size of the defect. The graft was then placed in the primary defect and oriented appropriately. Tissue Cultured Epidermal Autograft Text: The defect edges were debeveled with a #15 scalpel blade.  Given the location of the defect, shape of the defect and the proximity to free margins a tissue cultured epidermal autograft was deemed most appropriate.  The graft was then trimmed to fit the size of the defect.  The graft was then placed in the primary defect and oriented appropriately. Xenograft Text: The defect edges were debeveled with a #15 scalpel blade.  Given the location of the defect, shape of the defect and the proximity to free margins a xenograft was deemed most appropriate.  The graft was then trimmed to fit the size of the defect.  The graft was then placed in the primary defect and oriented appropriately. Purse String (Intermediate) Text: Given the location of the defect and the characteristics of the surrounding skin a pursestring intermediate closure was deemed most appropriate.  Undermining was performed circumfirentially around the surgical defect.  A purstring suture was then placed and tightened. Purse String (Simple) Text: Given the location of the defect and the characteristics of the surrounding skin a purse string simple closure was deemed most appropriate.  Undermining was performed circumferentially around the surgical defect.  A purse string suture was then placed and tightened. Partial Purse String (Intermediate) Text: Given the location of the defect and the characteristics of the surrounding skin an intermediate purse string closure was deemed most appropriate.  Undermining was performed circumferentially around the surgical defect.  A purse string suture was then placed and tightened. Wound tension of the circular defect prevented complete closure of the wound. Partial Purse String (Simple) Text: Given the location of the defect and the characteristics of the surrounding skin a simple purse string closure was deemed most appropriate.  Undermining was performed circumferentially around the surgical defect.  A purse string suture was then placed and tightened. Wound tension of the circular defect prevented complete closure of the wound. Complex Repair And Single Advancement Flap Text: The defect edges were debeveled with a #15 scalpel blade.  The primary defect was closed partially with a complex linear closure.  Given the location of the remaining defect, shape of the defect and the proximity to free margins a single advancement flap was deemed most appropriate for complete closure of the defect.  Using a sterile surgical marker, an appropriate advancement flap was drawn incorporating the defect and placing the expected incisions within the relaxed skin tension lines where possible.    The area thus outlined was incised deep to adipose tissue with a #15 scalpel blade.  The skin margins were undermined to an appropriate distance in all directions utilizing iris scissors. Complex Repair And Double Advancement Flap Text: The defect edges were debeveled with a #15 scalpel blade.  The primary defect was closed partially with a complex linear closure.  Given the location of the remaining defect, shape of the defect and the proximity to free margins a double advancement flap was deemed most appropriate for complete closure of the defect.  Using a sterile surgical marker, an appropriate advancement flap was drawn incorporating the defect and placing the expected incisions within the relaxed skin tension lines where possible.    The area thus outlined was incised deep to adipose tissue with a #15 scalpel blade.  The skin margins were undermined to an appropriate distance in all directions utilizing iris scissors. Complex Repair And Modified Advancement Flap Text: The defect edges were debeveled with a #15 scalpel blade.  The primary defect was closed partially with a complex linear closure.  Given the location of the remaining defect, shape of the defect and the proximity to free margins a modified advancement flap was deemed most appropriate for complete closure of the defect.  Using a sterile surgical marker, an appropriate advancement flap was drawn incorporating the defect and placing the expected incisions within the relaxed skin tension lines where possible.    The area thus outlined was incised deep to adipose tissue with a #15 scalpel blade.  The skin margins were undermined to an appropriate distance in all directions utilizing iris scissors. Complex Repair And A-T Advancement Flap Text: The defect edges were debeveled with a #15 scalpel blade.  The primary defect was closed partially with a complex linear closure.  Given the location of the remaining defect, shape of the defect and the proximity to free margins an A-T advancement flap was deemed most appropriate for complete closure of the defect.  Using a sterile surgical marker, an appropriate advancement flap was drawn incorporating the defect and placing the expected incisions within the relaxed skin tension lines where possible.    The area thus outlined was incised deep to adipose tissue with a #15 scalpel blade.  The skin margins were undermined to an appropriate distance in all directions utilizing iris scissors. Complex Repair And O-T Advancement Flap Text: The defect edges were debeveled with a #15 scalpel blade.  The primary defect was closed partially with a complex linear closure.  Given the location of the remaining defect, shape of the defect and the proximity to free margins an O-T advancement flap was deemed most appropriate for complete closure of the defect.  Using a sterile surgical marker, an appropriate advancement flap was drawn incorporating the defect and placing the expected incisions within the relaxed skin tension lines where possible.    The area thus outlined was incised deep to adipose tissue with a #15 scalpel blade.  The skin margins were undermined to an appropriate distance in all directions utilizing iris scissors. Complex Repair And O-L Flap Text: The defect edges were debeveled with a #15 scalpel blade.  The primary defect was closed partially with a complex linear closure.  Given the location of the remaining defect, shape of the defect and the proximity to free margins an O-L flap was deemed most appropriate for complete closure of the defect.  Using a sterile surgical marker, an appropriate flap was drawn incorporating the defect and placing the expected incisions within the relaxed skin tension lines where possible.    The area thus outlined was incised deep to adipose tissue with a #15 scalpel blade.  The skin margins were undermined to an appropriate distance in all directions utilizing iris scissors. Complex Repair And Bilobe Flap Text: The defect edges were debeveled with a #15 scalpel blade.  The primary defect was closed partially with a complex linear closure.  Given the location of the remaining defect, shape of the defect and the proximity to free margins a bilobe flap was deemed most appropriate for complete closure of the defect.  Using a sterile surgical marker, an appropriate advancement flap was drawn incorporating the defect and placing the expected incisions within the relaxed skin tension lines where possible.    The area thus outlined was incised deep to adipose tissue with a #15 scalpel blade.  The skin margins were undermined to an appropriate distance in all directions utilizing iris scissors. Complex Repair And Melolabial Flap Text: The defect edges were debeveled with a #15 scalpel blade.  The primary defect was closed partially with a complex linear closure.  Given the location of the remaining defect, shape of the defect and the proximity to free margins a melolabial flap was deemed most appropriate for complete closure of the defect.  Using a sterile surgical marker, an appropriate advancement flap was drawn incorporating the defect and placing the expected incisions within the relaxed skin tension lines where possible.    The area thus outlined was incised deep to adipose tissue with a #15 scalpel blade.  The skin margins were undermined to an appropriate distance in all directions utilizing iris scissors. Complex Repair And Rotation Flap Text: The defect edges were debeveled with a #15 scalpel blade.  The primary defect was closed partially with a complex linear closure.  Given the location of the remaining defect, shape of the defect and the proximity to free margins a rotation flap was deemed most appropriate for complete closure of the defect.  Using a sterile surgical marker, an appropriate advancement flap was drawn incorporating the defect and placing the expected incisions within the relaxed skin tension lines where possible.    The area thus outlined was incised deep to adipose tissue with a #15 scalpel blade.  The skin margins were undermined to an appropriate distance in all directions utilizing iris scissors. Complex Repair And Rhombic Flap Text: The defect edges were debeveled with a #15 scalpel blade.  The primary defect was closed partially with a complex linear closure.  Given the location of the remaining defect, shape of the defect and the proximity to free margins a rhombic flap was deemed most appropriate for complete closure of the defect.  Using a sterile surgical marker, an appropriate advancement flap was drawn incorporating the defect and placing the expected incisions within the relaxed skin tension lines where possible.    The area thus outlined was incised deep to adipose tissue with a #15 scalpel blade.  The skin margins were undermined to an appropriate distance in all directions utilizing iris scissors. Complex Repair And Transposition Flap Text: The defect edges were debeveled with a #15 scalpel blade.  The primary defect was closed partially with a complex linear closure.  Given the location of the remaining defect, shape of the defect and the proximity to free margins a transposition flap was deemed most appropriate for complete closure of the defect.  Using a sterile surgical marker, an appropriate advancement flap was drawn incorporating the defect and placing the expected incisions within the relaxed skin tension lines where possible.    The area thus outlined was incised deep to adipose tissue with a #15 scalpel blade.  The skin margins were undermined to an appropriate distance in all directions utilizing iris scissors. Complex Repair And V-Y Plasty Text: The defect edges were debeveled with a #15 scalpel blade.  The primary defect was closed partially with a complex linear closure.  Given the location of the remaining defect, shape of the defect and the proximity to free margins a V-Y plasty was deemed most appropriate for complete closure of the defect.  Using a sterile surgical marker, an appropriate advancement flap was drawn incorporating the defect and placing the expected incisions within the relaxed skin tension lines where possible.    The area thus outlined was incised deep to adipose tissue with a #15 scalpel blade.  The skin margins were undermined to an appropriate distance in all directions utilizing iris scissors. Complex Repair And M Plasty Text: The defect edges were debeveled with a #15 scalpel blade.  The primary defect was closed partially with a complex linear closure.  Given the location of the remaining defect, shape of the defect and the proximity to free margins an M plasty was deemed most appropriate for complete closure of the defect.  Using a sterile surgical marker, an appropriate advancement flap was drawn incorporating the defect and placing the expected incisions within the relaxed skin tension lines where possible.    The area thus outlined was incised deep to adipose tissue with a #15 scalpel blade.  The skin margins were undermined to an appropriate distance in all directions utilizing iris scissors. Complex Repair And Double M Plasty Text: The defect edges were debeveled with a #15 scalpel blade.  The primary defect was closed partially with a complex linear closure.  Given the location of the remaining defect, shape of the defect and the proximity to free margins a double M plasty was deemed most appropriate for complete closure of the defect.  Using a sterile surgical marker, an appropriate advancement flap was drawn incorporating the defect and placing the expected incisions within the relaxed skin tension lines where possible.    The area thus outlined was incised deep to adipose tissue with a #15 scalpel blade.  The skin margins were undermined to an appropriate distance in all directions utilizing iris scissors. Complex Repair And W Plasty Text: The defect edges were debeveled with a #15 scalpel blade.  The primary defect was closed partially with a complex linear closure.  Given the location of the remaining defect, shape of the defect and the proximity to free margins a W plasty was deemed most appropriate for complete closure of the defect.  Using a sterile surgical marker, an appropriate advancement flap was drawn incorporating the defect and placing the expected incisions within the relaxed skin tension lines where possible.    The area thus outlined was incised deep to adipose tissue with a #15 scalpel blade.  The skin margins were undermined to an appropriate distance in all directions utilizing iris scissors. Complex Repair And Z Plasty Text: The defect edges were debeveled with a #15 scalpel blade.  The primary defect was closed partially with a complex linear closure.  Given the location of the remaining defect, shape of the defect and the proximity to free margins a Z plasty was deemed most appropriate for complete closure of the defect.  Using a sterile surgical marker, an appropriate advancement flap was drawn incorporating the defect and placing the expected incisions within the relaxed skin tension lines where possible.    The area thus outlined was incised deep to adipose tissue with a #15 scalpel blade.  The skin margins were undermined to an appropriate distance in all directions utilizing iris scissors. Complex Repair And Dorsal Nasal Flap Text: The defect edges were debeveled with a #15 scalpel blade.  The primary defect was closed partially with a complex linear closure.  Given the location of the remaining defect, shape of the defect and the proximity to free margins a dorsal nasal flap was deemed most appropriate for complete closure of the defect.  Using a sterile surgical marker, an appropriate flap was drawn incorporating the defect and placing the expected incisions within the relaxed skin tension lines where possible.    The area thus outlined was incised deep to adipose tissue with a #15 scalpel blade.  The skin margins were undermined to an appropriate distance in all directions utilizing iris scissors. Complex Repair And Ftsg Text: The defect edges were debeveled with a #15 scalpel blade.  The primary defect was closed partially with a complex linear closure.  Given the location of the defect, shape of the defect and the proximity to free margins a full thickness skin graft was deemed most appropriate to repair the remaining defect.  The graft was trimmed to fit the size of the remaining defect.  The graft was then placed in the primary defect, oriented appropriately, and sutured into place. Complex Repair And Split-Thickness Skin Graft Text: The defect edges were debeveled with a #15 scalpel blade.  The primary defect was closed partially with a complex linear closure.  Given the location of the defect, shape of the defect and the proximity to free margins a split thickness skin graft was deemed most appropriate to repair the remaining defect.  The graft was trimmed to fit the size of the remaining defect.  The graft was then placed in the primary defect, oriented appropriately, and sutured into place. Complex Repair And Epidermal Autograft Text: The defect edges were debeveled with a #15 scalpel blade.  The primary defect was closed partially with a complex linear closure.  Given the location of the defect, shape of the defect and the proximity to free margins an epidermal autograft was deemed most appropriate to repair the remaining defect.  The graft was trimmed to fit the size of the remaining defect.  The graft was then placed in the primary defect, oriented appropriately, and sutured into place. Complex Repair And Dermal Autograft Text: The defect edges were debeveled with a #15 scalpel blade.  The primary defect was closed partially with a complex linear closure.  Given the location of the defect, shape of the defect and the proximity to free margins an dermal autograft was deemed most appropriate to repair the remaining defect.  The graft was trimmed to fit the size of the remaining defect.  The graft was then placed in the primary defect, oriented appropriately, and sutured into place. Complex Repair And Tissue Cultured Epidermal Autograft Text: The defect edges were debeveled with a #15 scalpel blade.  The primary defect was closed partially with a complex linear closure.  Given the location of the defect, shape of the defect and the proximity to free margins an tissue cultured epidermal autograft was deemed most appropriate to repair the remaining defect.  The graft was trimmed to fit the size of the remaining defect.  The graft was then placed in the primary defect, oriented appropriately, and sutured into place. Complex Repair And Xenograft Text: The defect edges were debeveled with a #15 scalpel blade.  The primary defect was closed partially with a complex linear closure.  Given the location of the defect, shape of the defect and the proximity to free margins a xenograft was deemed most appropriate to repair the remaining defect.  The graft was trimmed to fit the size of the remaining defect.  The graft was then placed in the primary defect, oriented appropriately, and sutured into place. Complex Repair And Skin Substitute Graft Text: The defect edges were debeveled with a #15 scalpel blade.  The primary defect was closed partially with a complex linear closure.  Given the location of the remaining defect, shape of the defect and the proximity to free margins a skin substitute graft was deemed most appropriate to repair the remaining defect.  The graft was trimmed to fit the size of the remaining defect.  The graft was then placed in the primary defect, oriented appropriately, and sutured into place. Path Notes (To The Dermatopathologist): Please check margins.  Superior notch Consent was obtained from the patient. The risks and benefits to therapy were discussed in detail. Specifically, the risks of infection, scarring, bleeding, prolonged wound healing, incomplete removal, allergy to anesthesia, nerve injury and recurrence were addressed. Prior to the procedure, the treatment site was clearly identified and confirmed by the patient. All components of Universal Protocol/PAUSE Rule completed. Render Post-Care Instructions In Note?: yes Post-Care Instructions: I reviewed with the patient in detail post-care instructions. Patient is not to engage in any heavy lifting, exercise, or swimming for the next 14 days. Should the patient develop any fevers, chills, bleeding, severe pain patient will contact the office immediately. Home Suture Removal Text: Patient was provided a home suture removal kit and will remove their sutures at home.  If they have any questions or difficulties they will call the office. Where Do You Want The Question To Include Opioid Counseling Located?: Case Summary Tab Information: Selecting Yes will display possible errors in your note based on the variables you have selected. This validation is only offered as a suggestion for you. PLEASE NOTE THAT THE VALIDATION TEXT WILL BE REMOVED WHEN YOU FINALIZE YOUR NOTE. IF YOU WANT TO FAX A PRELIMINARY NOTE YOU WILL NEED TO TOGGLE THIS TO 'NO' IF YOU DO NOT WANT IT IN YOUR FAXED NOTE.

## 2022-04-04 NOTE — TELEPHONE ENCOUNTER
Scheduled date of colonoscopy (as of today): 5/24/22  Physician performing colonoscopy: Dr Oliveira  Location of colonoscopy: [address] [place] [address]  Bowel prep reviewed with patient: Miralax/Dulcolax  Instructions reviewed with patient by: Yana/faraz  Clearances: Med Clearance faxed to [facility]

## 2022-04-06 ENCOUNTER — NEW PATIENT (OUTPATIENT)
Dept: URBAN - METROPOLITAN AREA CLINIC 6 | Facility: CLINIC | Age: 72
End: 2022-04-06

## 2022-04-06 DIAGNOSIS — H25.13: ICD-10-CM

## 2022-04-06 DIAGNOSIS — H50.10: ICD-10-CM

## 2022-04-06 DIAGNOSIS — H43.813: ICD-10-CM

## 2022-04-06 PROCEDURE — 99204 OFFICE O/P NEW MOD 45 MIN: CPT

## 2022-04-06 PROCEDURE — 92015 DETERMINE REFRACTIVE STATE: CPT

## 2022-04-06 ASSESSMENT — TONOMETRY
OD_IOP_MMHG: 9
OS_IOP_MMHG: 10

## 2022-04-06 ASSESSMENT — VISUAL ACUITY
OS_SC: 20/70
OS_PH: 20/40-2
OD_SC: 20/25-2
OU_CC: J3

## 2022-04-22 ENCOUNTER — APPOINTMENT (OUTPATIENT)
Dept: LAB | Facility: IMAGING CENTER | Age: 72
End: 2022-04-22
Payer: MEDICARE

## 2022-04-22 DIAGNOSIS — G35 MULTIPLE SCLEROSIS (HCC): ICD-10-CM

## 2022-04-22 LAB
BASOPHILS # BLD AUTO: 0.04 THOUSANDS/ΜL (ref 0–0.1)
BASOPHILS NFR BLD AUTO: 1 % (ref 0–1)
EOSINOPHIL # BLD AUTO: 0.16 THOUSAND/ΜL (ref 0–0.61)
EOSINOPHIL NFR BLD AUTO: 2 % (ref 0–6)
ERYTHROCYTE [DISTWIDTH] IN BLOOD BY AUTOMATED COUNT: 13.2 % (ref 11.6–15.1)
HCT VFR BLD AUTO: 41 % (ref 34.8–46.1)
HGB BLD-MCNC: 12.5 G/DL (ref 11.5–15.4)
IMM GRANULOCYTES # BLD AUTO: 0.04 THOUSAND/UL (ref 0–0.2)
IMM GRANULOCYTES NFR BLD AUTO: 1 % (ref 0–2)
LYMPHOCYTES # BLD AUTO: 1.05 THOUSANDS/ΜL (ref 0.6–4.47)
LYMPHOCYTES NFR BLD AUTO: 12 % (ref 14–44)
MCH RBC QN AUTO: 29.9 PG (ref 26.8–34.3)
MCHC RBC AUTO-ENTMCNC: 30.5 G/DL (ref 31.4–37.4)
MCV RBC AUTO: 98 FL (ref 82–98)
MONOCYTES # BLD AUTO: 0.47 THOUSAND/ΜL (ref 0.17–1.22)
MONOCYTES NFR BLD AUTO: 6 % (ref 4–12)
NEUTROPHILS # BLD AUTO: 6.72 THOUSANDS/ΜL (ref 1.85–7.62)
NEUTS SEG NFR BLD AUTO: 78 % (ref 43–75)
NRBC BLD AUTO-RTO: 0 /100 WBCS
PLATELET # BLD AUTO: 225 THOUSANDS/UL (ref 149–390)
PMV BLD AUTO: 10 FL (ref 8.9–12.7)
RBC # BLD AUTO: 4.18 MILLION/UL (ref 3.81–5.12)
WBC # BLD AUTO: 8.48 THOUSAND/UL (ref 4.31–10.16)

## 2022-04-22 PROCEDURE — 87186 SC STD MICRODIL/AGAR DIL: CPT

## 2022-04-22 PROCEDURE — 85025 COMPLETE CBC W/AUTO DIFF WBC: CPT

## 2022-04-22 PROCEDURE — 36415 COLL VENOUS BLD VENIPUNCTURE: CPT

## 2022-04-22 PROCEDURE — 87077 CULTURE AEROBIC IDENTIFY: CPT

## 2022-04-22 PROCEDURE — 87086 URINE CULTURE/COLONY COUNT: CPT

## 2022-04-25 LAB
BACTERIA UR CULT: ABNORMAL
BACTERIA UR CULT: ABNORMAL

## 2022-04-26 DIAGNOSIS — E55.9 VITAMIN D DEFICIENCY: ICD-10-CM

## 2022-04-26 DIAGNOSIS — M81.0 POSTMENOPAUSAL OSTEOPOROSIS: ICD-10-CM

## 2022-04-26 RX ORDER — ERGOCALCIFEROL 1.25 MG/1
50000 CAPSULE ORAL WEEKLY
Qty: 12 CAPSULE | Refills: 1 | Status: SHIPPED | OUTPATIENT
Start: 2022-04-26

## 2022-04-26 RX ORDER — ALENDRONATE SODIUM 70 MG/1
70 TABLET ORAL
Qty: 12 TABLET | Refills: 3 | Status: SHIPPED | OUTPATIENT
Start: 2022-04-26

## 2022-04-27 ENCOUNTER — TELEPHONE (OUTPATIENT)
Dept: INTERNAL MEDICINE CLINIC | Facility: OTHER | Age: 72
End: 2022-04-27

## 2022-04-27 DIAGNOSIS — N30.00 ACUTE CYSTITIS WITHOUT HEMATURIA: Primary | ICD-10-CM

## 2022-04-27 RX ORDER — CEPHALEXIN 500 MG/1
500 CAPSULE ORAL EVERY 6 HOURS SCHEDULED
Qty: 20 CAPSULE | Refills: 0 | Status: SHIPPED | OUTPATIENT
Start: 2022-04-27 | End: 2022-05-02

## 2022-04-27 NOTE — TELEPHONE ENCOUNTER
Please review urine and call patient back  She had this done last week   called wondering what results were

## 2022-04-27 NOTE — TELEPHONE ENCOUNTER
Notified patient she has a UTI and a prescription for Keflex will be sent to [facility]  She has taken the Keflex in the past   Dr Maria A Miller did not treat the current UTI

## 2022-04-27 NOTE — TELEPHONE ENCOUNTER
She is positive for a UTI   I will send keflex 4 times a day for 5 days to the pharmacy for her     Please just make sure she was not already started on antibiotics for this as this is dr Joe Payne patient and I do not see when or why this urine was ordered

## 2022-05-23 RX ORDER — SODIUM CHLORIDE 9 MG/ML
100 INJECTION, SOLUTION INTRAVENOUS CONTINUOUS
Status: CANCELLED | OUTPATIENT
Start: 2022-05-23

## 2022-05-24 ENCOUNTER — TELEPHONE (OUTPATIENT)
Dept: GASTROENTEROLOGY | Facility: CLINIC | Age: 72
End: 2022-05-24

## 2022-05-24 ENCOUNTER — PREP FOR PROCEDURE (OUTPATIENT)
Dept: GASTROENTEROLOGY | Facility: CLINIC | Age: 72
End: 2022-05-24

## 2022-05-24 DIAGNOSIS — Z12.11 COLON CANCER SCREENING: Primary | ICD-10-CM

## 2022-05-24 NOTE — TELEPHONE ENCOUNTER
Patients GI provider:  Dr Steve Duque    Number to return call: [phone]    Reason for call: Pt's  calling stating pt missed procedure due to going to wrong hospital  Looking to reschedule procedure for pt      Scheduled procedure/appointment date if applicable: N/A

## 2022-05-24 NOTE — TELEPHONE ENCOUNTER
Spoke with  states the [place] [facility] was closed and had no way of getting to [facility]   took him to [facility]  Scheduled date of colonoscopy (as of today): 6/7/22  Physician performing colonoscopy: Dr Brown  Location of colonoscopy: [facility]  Bowel prep reviewed with patient: Miralax/Dulcolax  Instructions reviewed with patient by: Yana/faraz  Clearances: Patient  is aware to hold Aggrenox 2 days prior to procedure

## 2022-05-25 NOTE — TELEPHONE ENCOUNTER
Patient's  is calling on behalf of patient  Patient has a colonoscopy with Dr Iman Huffman scheduled for 6/7  Instead of Miralax/ Dulcolax he would like Ivelisse sent to pharmacy on file  Kristy Tompkins [phone]

## 2022-05-26 DIAGNOSIS — R19.5 POSITIVE COLORECTAL CANCER SCREENING USING COLOGUARD TEST: Primary | ICD-10-CM

## 2022-05-26 RX ORDER — BISACODYL 5 MG/1
TABLET, DELAYED RELEASE ORAL
Qty: 2 TABLET | Refills: 0 | Status: SHIPPED | OUTPATIENT
Start: 2022-05-26 | End: 2023-09-06

## 2022-05-26 NOTE — TELEPHONE ENCOUNTER
Pt aware new prep scripts [facility] as well instructions requested to be emailed to [email or web address]  instructions emailed

## 2022-06-06 DIAGNOSIS — K21.9 GASTROESOPHAGEAL REFLUX DISEASE WITHOUT ESOPHAGITIS: ICD-10-CM

## 2022-06-06 RX ORDER — PANTOPRAZOLE SODIUM 40 MG/1
40 TABLET, DELAYED RELEASE ORAL DAILY
Qty: 90 TABLET | Refills: 0 | Status: SHIPPED | OUTPATIENT
Start: 2022-06-06

## 2022-06-14 ENCOUNTER — TELEPHONE (OUTPATIENT)
Dept: GASTROENTEROLOGY | Facility: CLINIC | Age: 72
End: 2022-06-14

## 2022-06-14 ENCOUNTER — PREP FOR PROCEDURE (OUTPATIENT)
Dept: GASTROENTEROLOGY | Facility: CLINIC | Age: 72
End: 2022-06-14

## 2022-06-14 DIAGNOSIS — R19.5 POSITIVE COLORECTAL CANCER SCREENING USING COLOGUARD TEST: Primary | ICD-10-CM

## 2022-06-14 NOTE — TELEPHONE ENCOUNTER
Patients GI provider:  Dr Rachael Alejandra    Number to return call: ([phone])    Reason for call:  calling to reschedule his wife's colonoscopy at the [facility]  Please call him to reschedule  Thank you!     Scheduled procedure/appointment date if applicable: Apt/procedure

## 2022-06-14 NOTE — TELEPHONE ENCOUNTER
Pt rescheduled colonoscopy on 8/25 with Dr Saleh at [place]  Pt confirmed she has prep instructions  Med Clearance faxed to [facility]

## 2022-06-22 ENCOUNTER — TELEPHONE (OUTPATIENT)
Dept: GASTROENTEROLOGY | Facility: CLINIC | Age: 72
End: 2022-06-22

## 2022-06-22 NOTE — TELEPHONE ENCOUNTER
Patients GI provider:  Dr Micki Moseley    Number to return call: (dtr Mauroneelima Can, [phone])    Reason for call: Daughter concerned because her mother had to be rescheduled for colonoscopy because she wasn't cleaned out and was put on for the end of August  Stating in the state of [place] when some ones colonoguard comes back positive for Cancer, shouldn't have to wait that long  Mom has MS, homebound  She is very concerned  Is there anyway she can be moved up  Please call the daughter  Thank you!     Scheduled procedure/appointment date if applicable: Apt/procedure 8/25/22

## 2022-06-22 NOTE — TELEPHONE ENCOUNTER
OV: 2/23/22 Dr Jh Francisco   Hx: Positive cologuard test    Spoke to pt's daughter who is very concerned about date of colonoscopy for 8/25 given positive cologuard test from 11/18/21  Informed daughter appears pt has been rescheduled a couple times and recently called in on 6/14 to make scope appt  Daughter is very concerned with [facility] standard of care regarding positive cologuard testing  Requesting a sooner date  Surgery schedulers: Is there a sooner date available?   Dr Jh Francisco: Please advise regarding appropriate schedule dates

## 2022-06-23 NOTE — TELEPHONE ENCOUNTER
Spoke to  and pt is scheduled for 06/27/2022 with Dr Nallely Haider @ [facility]     Scheduled date of Colonoscopy (as of today): 06/27/2022  Physician performing: Dr Nallely Haider  Location of procedure: [facility]  Bowel prep reviewed with patient: 2 DAY GOLYTELY/Trilyte  Instructions reviewed with patient by: Mary  Clearances: 2 day hold Aggrenox approved by [facility], pt notified  Pt is aware to hold Aggrenox 2 days prior to procedure  Went over prep instructions with  in detail and emailed as requested to [email or web address]     Pt's  has my direct office phone number for any additional questions or concerns       Procedure will be @ 12 pm due to patient request and was ok'ed by Gi lab Rhina Metzger due to patient's MS

## 2022-06-27 ENCOUNTER — ANESTHESIA EVENT (OUTPATIENT)
Dept: ANESTHESIOLOGY | Facility: HOSPITAL | Age: 72
End: 2022-06-27

## 2022-06-27 ENCOUNTER — ANESTHESIA (OUTPATIENT)
Dept: ANESTHESIOLOGY | Facility: HOSPITAL | Age: 72
End: 2022-06-27

## 2022-06-29 ENCOUNTER — TELEPHONE (OUTPATIENT)
Dept: OTHER | Facility: OTHER | Age: 72
End: 2022-06-29

## 2022-06-29 NOTE — TELEPHONE ENCOUNTER
Patient calling stating that she has an appointment with Dr Damian Tamayo, states that she had positive cologuard and is having pain, is unsure if Dr Damian Tamayo would want to see her prior to scheduled appointment or order any further testing prior to appointment, please advise

## 2022-07-05 ENCOUNTER — TELEPHONE (OUTPATIENT)
Dept: INTERNAL MEDICINE CLINIC | Facility: OTHER | Age: 72
End: 2022-07-05

## 2022-07-05 DIAGNOSIS — R39.9 UTI SYMPTOMS: Primary | ICD-10-CM

## 2022-07-05 NOTE — TELEPHONE ENCOUNTER
Patient called with complaint of uti, I told her she has to make carlos  She told me that dr Cipriano Dash sends in a rx for her bc she gets them often bc she has MS  ALSO:  cologaurd was positive 6 months ago  She has not seen  gastroenterology  What should she do?        PLEASE CALL BACK

## 2022-07-05 NOTE — TELEPHONE ENCOUNTER
Unfortunately I was asked to examine patient prior to sending in a prescription please set up an appointment  If she needs to be squeeze in tomorrow please let me know  We coul;d do virtual with someone bringing in a urine sample for her  If she would like to go that route, I can place an order for a urine dip and sent for culture and we can have it done before appointment tomorrow

## 2022-07-06 ENCOUNTER — TELEMEDICINE (OUTPATIENT)
Dept: INTERNAL MEDICINE CLINIC | Facility: OTHER | Age: 72
End: 2022-07-06
Payer: MEDICARE

## 2022-07-06 VITALS — WEIGHT: 200 LBS | BODY MASS INDEX: 28 KG/M2 | HEIGHT: 71 IN

## 2022-07-06 DIAGNOSIS — N31.9 NEUROGENIC BLADDER: ICD-10-CM

## 2022-07-06 DIAGNOSIS — R35.0 FREQUENT URINATION: Primary | ICD-10-CM

## 2022-07-06 DIAGNOSIS — N30.00 ACUTE CYSTITIS WITHOUT HEMATURIA: ICD-10-CM

## 2022-07-06 LAB
SL AMB  POCT GLUCOSE, UA: ABNORMAL
SL AMB LEUKOCYTE ESTERASE,UA: ABNORMAL
SL AMB POCT BILIRUBIN,UA: ABNORMAL
SL AMB POCT BLOOD,UA: ABNORMAL
SL AMB POCT CLARITY,UA: CLEAR
SL AMB POCT COLOR,UA: ABNORMAL
SL AMB POCT KETONES,UA: ABNORMAL
SL AMB POCT NITRITE,UA: POSITIVE
SL AMB POCT PH,UA: 5.5
SL AMB POCT SPECIFIC GRAVITY,UA: >=1.03
SL AMB POCT URINE PROTEIN: ABNORMAL
SL AMB POCT UROBILINOGEN: 0.2

## 2022-07-06 PROCEDURE — 87086 URINE CULTURE/COLONY COUNT: CPT | Performed by: NURSE PRACTITIONER

## 2022-07-06 PROCEDURE — 81003 URINALYSIS AUTO W/O SCOPE: CPT | Performed by: NURSE PRACTITIONER

## 2022-07-06 PROCEDURE — 99442 PR PHYS/QHP TELEPHONE EVALUATION 11-20 MIN: CPT | Performed by: NURSE PRACTITIONER

## 2022-07-06 PROCEDURE — 87077 CULTURE AEROBIC IDENTIFY: CPT | Performed by: NURSE PRACTITIONER

## 2022-07-06 PROCEDURE — 87186 SC STD MICRODIL/AGAR DIL: CPT | Performed by: NURSE PRACTITIONER

## 2022-07-06 RX ORDER — PAROXETINE 30 MG/1
1 TABLET, FILM COATED ORAL DAILY
COMMUNITY
Start: 2022-04-13

## 2022-07-06 RX ORDER — LEVOFLOXACIN 250 MG/1
250 TABLET ORAL EVERY 24 HOURS
Qty: 3 TABLET | Refills: 0 | Status: SHIPPED | OUTPATIENT
Start: 2022-07-06 | End: 2022-07-09

## 2022-07-06 RX ORDER — LINACLOTIDE 145 UG/1
CAPSULE, GELATIN COATED ORAL
COMMUNITY
Start: 2022-07-01

## 2022-07-06 NOTE — ASSESSMENT & PLAN NOTE
Will send urine for culture  Will send in Levaquin based off last urine culture  Encouraged increased fluid intake  Call back if worsen of symptoms or now relief of symptoms

## 2022-07-06 NOTE — PROGRESS NOTES
Virtual Regular Visit    Verification of patient location:    Patient is located in the following state in which I hold an active license [place]      Assessment/Plan:    Problem List Items Addressed This Visit        Genitourinary    Acute cystitis without hematuria     Will send urine for culture  Will send in Levaquin based off last urine culture  Encouraged increased fluid intake  Call back if worsen of symptoms or now relief of symptoms  Other    Neurogenic bladder     Advised to cath more frequently to prevent UTI  Other Visit Diagnoses     Frequent urination    -  Primary    Relevant Medications    levofloxacin (LEVAQUIN) 250 mg tablet    Other Relevant Orders    POCT urine dip auto non-scope (Completed)    Urine culture               Reason for visit is   Chief Complaint   Patient presents with    Urinary Tract Infection     phone only -- [id number] symptoms, started 2 weeks ago, burning  Frequency, and urgency  (dropping urine off)        Encounter provider JASON Holley    Provider located at [address] [place] [id number]      Recent Visits  Date Type Provider Dept   07/05/22 Telephone Ngozi Vera DO [facility]   Showing recent visits within past 7 days and meeting all other requirements  Today's Visits  Date Type Provider Dept   07/06/22 [facility] JASON Penaloza [facility]   Showing today's visits and meeting all other requirements  Future Appointments  No visits were found meeting these conditions  Showing future appointments within next 150 days and meeting all other requirements       The patient was identified by name and date of birth  Trini Barron was informed that this is a telemedicine visit and that the visit is being conducted through Telephone    My office door was closed  No one else was in the room  She acknowledged consent and understanding of privacy and security of the video platform  The patient has agreed to participate and understands they can discontinue the visit at any time  Patient is aware this is a billable service  Windy Krishnamurthy is a 66 y o  female    Patient presents with with UTI like symptoms  She self cath 4 times a day, large amounts  Urine is cloudy, and has a foul smell      Urinary Tract Infection   This is a new problem  Episode onset: 2 weeks ago  The problem has been gradually worsening  The quality of the pain is described as burning  There has been no fever  Associated symptoms include flank pain, frequency and urgency  Pertinent negatives include no chills, hematuria, nausea or vomiting  She has tried increased fluids for the symptoms  The treatment provided no relief  Her past medical history is significant for recurrent UTIs  Past Medical History:   Diagnosis Date    Acute cystitis     Anxiety     Arthritis     Cauda equina syndrome with neurogenic bladder ([facility] [address] )     Disease of thyroid gland     Dysuria     Multiple sclerosis (HCC)     Neurogenic bladder     Nocturia     Rheumatoid arthritis (HCC)     UTI (urinary tract infection)        Past Surgical History:   Procedure Laterality Date    BREAST EXCISIONAL BIOPSY Bilateral     greater than 12 years ago    CYSTOSCOPY  2012    KNEE SURGERY         Current Outpatient Medications   Medication Sig Dispense Refill    alendronate (FOSAMAX) 70 mg tablet Take 1 tablet (70 mg total) by mouth every 7 days Take with full glass water, on empty stomach, do not eat or lay down for 30 min   12 tablet 3    ALPRAZolam (XANAX) 1 mg tablet Take 1 tablet (1 mg total) by mouth 2 (two) times a day as needed for anxiety 60 tablet 1    aspirin-dipyridamole (AGGRENOX)  mg per 12 hr capsule Take 1 capsule by mouth every 12 (twelve) hours      baclofen 10 mg tablet Take 1 tablet (10 mg total) by mouth 4 (four) times a day 120 tablet 5    bisacodyl (DULCOLAX) 5 mg EC tablet Please follow prep instructions provided by the office  2 tablet 0    bisacodyl (DULCOLAX) 5 mg EC tablet Take as instructed by GI office for bowel prep for colonoscopy 2 tablet 0    buPROPion (WELLBUTRIN SR) 200 MG 12 hr tablet Take 1 tablet by mouth 2 (two) times a day        carBAMazepine (TEGretol XR) 200 mg 12 hr tablet Take 200 mg by mouth 2 (two) times a day        Cholecalciferol 2000 units CAPS Take 1 capsule by mouth      ergocalciferol (VITAMIN D2) 50,000 units Take 1 capsule (50,000 Units total) by mouth once a week 12 capsule 1    levofloxacin (LEVAQUIN) 250 mg tablet Take 1 tablet (250 mg total) by mouth every 24 hours for 3 days 3 tablet 0    levothyroxine 200 mcg tablet TAKE 1 TABLET BY MOUTH  DAILY      Linzess 145 MCG CAPS       methadone (DOLOPHINE) 10 mg tablet Take two tabs by mouth three times daily and three tabs at bedtime  Long term medication,      methylphenidate (RITALIN) 20 MG tablet Three tabs daily - Long term medication      morphine (MS CONTIN) 60 mg 12 hr tablet 2 tabs in am; 1 tab mid-morning; 1 tab mid afternoon; 2 tabs at bedtime  Long term medication      naloxegol oxalate (Movantik) 25 MG tablet Take 1 tablet (25 mg total) by mouth in the morning 30 tablet 1    Nutritional Supplements (ProSource No Carb) LIQD Take 30 mL by mouth daily 887 mL 1    pantoprazole (PROTONIX) 40 mg tablet Take 1 tablet (40 mg total) by mouth daily 90 tablet 0    PARoxetine (PAXIL) 30 mg tablet Take 1 tablet by mouth in the morning      polyethylene glycol (GLYCOLAX) 17 GM/SCOOP powder Take as directed by the office   238 g 0    polyethylene glycol (GOLYTELY) 4000 mL solution Take as instructed by GI office for bowel prep 4000 mL 0    pregabalin (LYRICA) 50 mg capsule Take 1 capsule (50 mg total) by mouth 2 (two) times a day 180 capsule 1    prochlorperazine (COMPAZINE) 25 mg suppository Insert 25 mg into the rectum daily as needed for nausea or vomiting      promethazine (PHENERGAN) 25 mg suppository Insert 1 suppository (25 mg total) into the rectum every 6 (six) hours as needed for nausea or vomiting 12 each 5    promethazine (PHENERGAN) 25 mg tablet Take 1 tablet (25 mg total) by mouth every 6 (six) hours as needed for nausea or vomiting 60 tablet 5    traZODone (DESYREL) 150 mg tablet Take 1 tablet (150 mg total) by mouth daily at bedtime 90 tablet 1     No current facility-administered medications for this visit  Allergies   Allergen Reactions    Codeine      Other reaction(s): Other (See Comments)  can take morphine  Other reaction(s): Other (See Comments)  can take morphine  Can take morphine    Penicillins        Review of Systems   Constitutional: Negative for activity change, appetite change, chills, diaphoresis and fever  HENT: Negative for congestion, ear discharge, ear pain, postnasal drip, rhinorrhea, sinus pressure, sinus pain and sore throat  Eyes: Negative for pain, discharge, itching and visual disturbance  Respiratory: Negative for cough, chest tightness, shortness of breath and wheezing  Cardiovascular: Negative for chest pain, palpitations and leg swelling  Gastrointestinal: Negative for abdominal pain, constipation, diarrhea, nausea and vomiting  Endocrine: Negative for polydipsia, polyphagia and polyuria  Genitourinary: Positive for dysuria, flank pain, frequency, pelvic pain and urgency  Negative for difficulty urinating and hematuria  Musculoskeletal: Negative for arthralgias, back pain and neck pain  Skin: Negative for rash and wound  Neurological: Negative for dizziness, weakness, numbness and headaches  Video Exam    Vitals:    07/06/22 1543   Weight: 90 7 kg (200 lb)   Height: 5' 11" (1 803 m)       Physical Exam  Neurological:      Mental Status: She is alert and oriented to person, place, and time  I spent 15 minutes directly with the patient during this visit    VIRTUAL VISIT [facility] verbally agrees to participate in [employer]  Pt is aware that [employer] could be limited without vital signs or the ability to perform a full hands-on physical Eva Betancourt understands she or the provider may request at any time to terminate the video visit and request the patient to seek care or treatment in person

## 2022-07-09 LAB
BACTERIA UR CULT: ABNORMAL
BACTERIA UR CULT: ABNORMAL

## 2022-07-11 ENCOUNTER — TELEPHONE (OUTPATIENT)
Dept: INTERNAL MEDICINE CLINIC | Facility: OTHER | Age: 72
End: 2022-07-11

## 2022-07-11 DIAGNOSIS — N30.00 ACUTE CYSTITIS WITHOUT HEMATURIA: Primary | ICD-10-CM

## 2022-07-11 RX ORDER — PHENAZOPYRIDINE HYDROCHLORIDE 200 MG/1
200 TABLET, FILM COATED ORAL
Qty: 10 TABLET | Refills: 0 | Status: SHIPPED | OUTPATIENT
Start: 2022-07-11 | End: 2023-02-09

## 2022-07-11 RX ORDER — LEVOFLOXACIN 250 MG/1
250 TABLET ORAL DAILY
Qty: 5 TABLET | Refills: 0 | Status: SHIPPED | OUTPATIENT
Start: 2022-07-11 | End: 2022-07-16

## 2022-07-11 NOTE — TELEPHONE ENCOUNTER
Spoke to pt and s/o in regarding to rescheduling  They would like the procedure scheduled at [facility]    They will check their schedule and we will look for availability 07/12/2022

## 2022-07-11 NOTE — TELEPHONE ENCOUNTER
Patient is wondering if she has to  the pyridium she thinks that it may be too expensive  Does she need to take both of them

## 2022-07-12 NOTE — TELEPHONE ENCOUNTER
Pt's spouse states she is symptomatic and has an appt with Dr Mardi Halsted in two days  Pt will discuss matter with   And schedule needed procedure at office visit

## 2022-07-14 ENCOUNTER — OFFICE VISIT (OUTPATIENT)
Dept: GASTROENTEROLOGY | Facility: CLINIC | Age: 72
End: 2022-07-14
Payer: MEDICARE

## 2022-07-14 VITALS
HEIGHT: 71 IN | SYSTOLIC BLOOD PRESSURE: 124 MMHG | DIASTOLIC BLOOD PRESSURE: 68 MMHG | WEIGHT: 190 LBS | TEMPERATURE: 96.7 F | BODY MASS INDEX: 26.6 KG/M2

## 2022-07-14 DIAGNOSIS — K59.03 DRUG-INDUCED CONSTIPATION: ICD-10-CM

## 2022-07-14 DIAGNOSIS — R19.5 POSITIVE COLORECTAL CANCER SCREENING USING COLOGUARD TEST: Primary | ICD-10-CM

## 2022-07-14 DIAGNOSIS — F11.20 CONTINUOUS OPIOID DEPENDENCE (HCC): ICD-10-CM

## 2022-07-14 PROCEDURE — 99213 OFFICE O/P EST LOW 20 MIN: CPT | Performed by: INTERNAL MEDICINE

## 2022-07-14 RX ORDER — NALOXEGOL OXALATE 25 MG/1
25 TABLET, FILM COATED ORAL DAILY
Qty: 30 TABLET | Refills: 1 | Status: SHIPPED | OUTPATIENT
Start: 2022-07-14
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
## 2022-07-14 NOTE — PATIENT INSTRUCTIONS
-for your colonoscopy you will need a two day preparation period  You will use over the counter miralax and magnesium citrate two days in a row  Mix the miralax with half a gallon of fluid (such as Gatorade)  For these two days as well, please adhere to a clear liquid diet   -You will also need to hold your anticoagulation for two days prior to your scheduled colonoscopy  -please  the [facility] [address] from your pharmacy  Please start taking Movantik once a day, along with the Linzess as you area already taking once a day in the morning  STOP taking dulcolax         Scheduled date of colonoscopy (as of today):  7/19/22  Physician performing colonoscopy: Dr Cuong Paniagua  Location of colonoscopy: [facility]   Bowel prep reviewed with patient: 2 day miralax/mag citrate  Instructions reviewed with patient by: Melva   Clearances:   [place] - Dr Sho Cobb
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
## 2022-07-15 ENCOUNTER — TELEPHONE (OUTPATIENT)
Dept: GASTROENTEROLOGY | Facility: CLINIC | Age: 72
End: 2022-07-15

## 2022-07-15 NOTE — TELEPHONE ENCOUNTER
Spoke with Dr Marylee Loader office and got verbal that patient may hold her Aggrenox 2 days prior to the procedure  Left detailed message for the patient regarding hold  Will monitor for signed clearance to be faxed over

## 2022-07-19 ENCOUNTER — ANESTHESIA EVENT (OUTPATIENT)
Dept: GASTROENTEROLOGY | Facility: HOSPITAL | Age: 72
End: 2022-07-19

## 2022-07-19 ENCOUNTER — ANESTHESIA (OUTPATIENT)
Dept: GASTROENTEROLOGY | Facility: HOSPITAL | Age: 72
End: 2022-07-19

## 2022-07-20 ENCOUNTER — TELEPHONE (OUTPATIENT)
Dept: GASTROENTEROLOGY | Facility: CLINIC | Age: 72
End: 2022-07-20

## 2022-07-20 DIAGNOSIS — R19.5 POSITIVE COLORECTAL CANCER SCREENING USING COLOGUARD TEST: ICD-10-CM

## 2022-07-20 NOTE — TELEPHONE ENCOUNTER
Patients GI provider:  Dr Currie Crease    Number to return call: [phone]    Reason for call: Pt calling to schedule procedure  Pt has paperwork from office stating procedure date was to be on 7/21/22  Pt is upset because procedure dates were wrong and procedure was canceled  Pt can reached in the afternoon  Scheduled procedure/appointment date if applicable: Procedure ? 7/21/22

## 2022-07-21 ENCOUNTER — TELEPHONE (OUTPATIENT)
Dept: GASTROENTEROLOGY | Facility: CLINIC | Age: 72
End: 2022-07-21

## 2022-07-21 NOTE — TELEPHONE ENCOUNTER
Procedure rescheduled to 7/27/22 with Dr Joe Velasquez at [facility]  Patient still has paperwork

## 2022-07-21 NOTE — TELEPHONE ENCOUNTER
Patients GI provider:  Dr Helder Farias    Number to return call: ([phone])    Reason for call:  calling because his wife was scheduled according to the paperwork for her colonoscopy 7/21 given at her appointment  She was on the schedule for 7/19 so she didn't prep for today and procedure was cancelled   asking for a call to reschedule and for an explanation as to what happened  Thank you!     Scheduled procedure/appointment date if applicable: Apt/procedure

## 2022-07-21 NOTE — TELEPHONE ENCOUNTER
Patient's  called and is asking for the Susie haileoution be sent into [facility] for patient's colonoscopy  Thank you!

## 2022-07-21 NOTE — TELEPHONE ENCOUNTER
Called and spoke with pt's   New 2-day bowel prep mailed and emailed [email or web address]) to pt  Golytely sent to pharmacy  Called pt back to confirm he received the 3 pages of prep but his computer was slow and he couldn't check now  Wanted to ensure pt is in for 1 pm, which Veverly Harmeet did put in appt notes  Pt's  had no further questions

## 2022-07-27 ENCOUNTER — HOSPITAL ENCOUNTER (OUTPATIENT)
Dept: GASTROENTEROLOGY | Facility: HOSPITAL | Age: 72
Setting detail: OUTPATIENT SURGERY
Discharge: HOME | End: 2022-07-27
Attending: INTERNAL MEDICINE
Payer: MEDICARE

## 2022-07-27 VITALS
HEIGHT: 71 IN | WEIGHT: 200 LBS | DIASTOLIC BLOOD PRESSURE: 59 MMHG | HEART RATE: 56 BPM | TEMPERATURE: 97.5 F | RESPIRATION RATE: 18 BRPM | OXYGEN SATURATION: 95 % | BODY MASS INDEX: 28 KG/M2 | SYSTOLIC BLOOD PRESSURE: 122 MMHG

## 2022-07-27 DIAGNOSIS — R19.5 POSITIVE COLORECTAL CANCER SCREENING USING COLOGUARD TEST: ICD-10-CM

## 2022-07-27 PROCEDURE — 45378 DIAGNOSTIC COLONOSCOPY: CPT | Performed by: INTERNAL MEDICINE

## 2022-07-27 RX ORDER — LIDOCAINE HYDROCHLORIDE 10 MG/ML
INJECTION, SOLUTION EPIDURAL; INFILTRATION; INTRACAUDAL; PERINEURAL AS NEEDED
Status: DISCONTINUED | OUTPATIENT
Start: 2022-07-27 | End: 2022-07-27

## 2022-07-27 RX ORDER — PROPOFOL 10 MG/ML
INJECTION, EMULSION INTRAVENOUS AS NEEDED
Status: DISCONTINUED | OUTPATIENT
Start: 2022-07-27 | End: 2022-07-27

## 2022-07-27 RX ORDER — SODIUM CHLORIDE 9 MG/ML
INJECTION, SOLUTION INTRAVENOUS CONTINUOUS PRN
Status: DISCONTINUED | OUTPATIENT
Start: 2022-07-27 | End: 2022-07-27

## 2022-07-27 RX ADMIN — SODIUM CHLORIDE: 0.9 INJECTION, SOLUTION INTRAVENOUS at 13:13

## 2022-07-27 RX ADMIN — PROPOFOL 30 MG: 10 INJECTION, EMULSION INTRAVENOUS at 13:33

## 2022-07-27 RX ADMIN — PROPOFOL 50 MG: 10 INJECTION, EMULSION INTRAVENOUS at 13:35

## 2022-07-27 RX ADMIN — PROPOFOL 30 MG: 10 INJECTION, EMULSION INTRAVENOUS at 13:37

## 2022-07-27 RX ADMIN — PROPOFOL 30 MG: 10 INJECTION, EMULSION INTRAVENOUS at 13:43

## 2022-07-27 RX ADMIN — PROPOFOL 50 MG: 10 INJECTION, EMULSION INTRAVENOUS at 13:40

## 2022-07-27 RX ADMIN — PROPOFOL 100 MG: 10 INJECTION, EMULSION INTRAVENOUS at 13:18

## 2022-07-27 RX ADMIN — LIDOCAINE HYDROCHLORIDE 50 MG: 10 INJECTION, SOLUTION EPIDURAL; INFILTRATION; INTRACAUDAL; PERINEURAL at 13:18

## 2022-07-27 RX ADMIN — PROPOFOL 50 MG: 10 INJECTION, EMULSION INTRAVENOUS at 13:27

## 2022-07-27 RX ADMIN — PROPOFOL 50 MG: 10 INJECTION, EMULSION INTRAVENOUS at 13:31

## 2022-07-27 RX ADMIN — PROPOFOL 50 MG: 10 INJECTION, EMULSION INTRAVENOUS at 13:22

## 2022-07-27 NOTE — INTERVAL H&P NOTE
H&P reviewed  After examining the patient I find no changes in the patients condition since the H&P had been written      Vitals:    07/27/22 1304   BP: (!) 173/77   Pulse: 69   Resp: 20   Temp: 99 3 °F (37 4 °C)   SpO2: 100%

## 2022-07-27 NOTE — ANESTHESIA POSTPROCEDURE EVALUATION
Post-Op Assessment Note    CV Status:  Stable    Pain management: adequate     Mental Status:  Alert and awake   Hydration Status:  Euvolemic   PONV Controlled:  Controlled   Airway Patency:  Patent      Post Op Vitals Reviewed: Yes      Staff: CRNA, Anesthesiologist         No complications documented      /56 (07/27/22 1350)    Temp 97 5 °F (36 4 °C) (07/27/22 1350)    Pulse 64 (07/27/22 1350)   Resp 16 (07/27/22 1350)    SpO2 93 % (07/27/22 1350)

## 2022-07-27 NOTE — ANESTHESIA PREPROCEDURE EVALUATION
Procedure:  COLONOSCOPY    Relevant Problems   ENDO   (+) Acquired hypothyroidism      NEURO/PSYCH   (+) Chronic pain   (+) Continuous opioid dependence (HCC)   (+) Depression, recurrent (HCC)             Anesthesia Plan  ASA Score- 3     Anesthesia Type- IV sedation with anesthesia with ASA Monitors  Additional Monitors:   Airway Plan:     Comment: IV sedation,  standard ASA monitors  Risks and benefits discussed with patient; patient consented and agrees to proceed  I saw and evaluated the patient  If seen with CRNA, we have discussed the anesthetic plan and I am in agreement that the plan is appropriate for the patient          Plan Factors-    Induction- intravenous  Postoperative Plan-     Informed Consent- Anesthetic plan and risks discussed with patient  I personally reviewed this patient with the CRNA  Discussed and agreed on the Anesthesia Plan with the CRNA  Terry Bland

## 2022-08-09 ENCOUNTER — RA CDI HCC (OUTPATIENT)
Dept: OTHER | Facility: HOSPITAL | Age: 72
End: 2022-08-09

## 2022-08-09 NOTE — PROGRESS NOTES
[address]  coding opportunities       Chart reviewed, no opportunity found: CHART REVIEWED, NO OPPORTUNITY FOUND        Patients Insurance     Medicare Insurance: Medicare

## 2022-08-12 ENCOUNTER — NURSE TRIAGE (OUTPATIENT)
Dept: OTHER | Facility: OTHER | Age: 72
End: 2022-08-12

## 2022-08-12 NOTE — TELEPHONE ENCOUNTER
Reason for Disposition   Patient wants to be seen    Answer Assessment - Initial Assessment Questions  1  STOOL PATTERN OR FREQUENCY: "How often do you pass bowel movements (BMs)?"  (Normal range: tid to q 3 days)  "When was the last BM passed?"        Yesterday   2  STRAINING: "Do you have to strain to have a BM?"       Yes  3  RECTAL PAIN: "Does your rectum hurt when the stool comes out?" If Yes, ask: "Do you have hemorrhoids? How bad is the pain?"  (Scale 1-10; or mild, moderate, severe)      No   4  STOOL COMPOSITION: "Are the stools hard?"       Sometimes yes  5  BLOOD ON STOOLS: "Has there been any blood on the toilet tissue or on the surface of the BM?" If Yes, ask: "When was the last time?"       Occasionally  6  CHRONIC CONSTIPATION: "Is this a new problem for you?"  If no, ask: "How long have you had this problem?" (days, weeks, months)       Yes   7  CHANGES IN DIET OR HYDRATION: "Have there been any recent changes in your diet?" "How much fluids are you drinking consuming on a daily basis?"  "How much have you had to drink today?"      No  8  MEDICATIONS: "Have you been taking any new medications?" "Are you taking any narcotic pain medications?" (e g , Vicoden, Percocet, morphine, dilaudid)      Yes started-Movantix 5 days              Stopped linzess 5day              Takes morphine and methadone  9  LAXATIVES: "Have you been using any stool softeners, laxatives, or enemas?"  If yes, ask "What, how often, and when was the last time?"        Stool softner and laxative  10  ACTIVITY:  "How much walking do you do every day? on a daily basis?"  "Has your activity level decreased in the past week?"          Unable to walk   11  CAUSE: "What do you think is causing the constipation?"         MS  12  OTHER SYMPTOMS: "Do you have any other symptoms?" (e g , abdominal pain, bloating, fever, vomiting)        nausea  13   MEDICAL HISTORY: "Do you have a history of hemorrhoids, rectal fissures, or rectal surgery or rectal abscess?"          MS  14   PREGNANCY: "Is there any chance you are pregnant?" "When was your last menstrual period?"        No    Protocols used: CONSTIPATION-ADULT-[place]

## 2022-08-12 NOTE — TELEPHONE ENCOUNTER
Patient is calling with complaints of abdominal cramping, and constipation  She is having partial bowel movements and she is currently on Monvantik  She started this medication 5 days ago  Patient has MS and takes Morphine and Methadone  She has chronic constipation problems  Patient states that she cannot make it into the office today to be seen but she would like to try next week to come in for this issue if the Movantik doesn't start working  If she has any other issues over the weekend patient states that she will seek care in the ED

## 2022-08-12 NOTE — TELEPHONE ENCOUNTER
Regarding: constipation and abdominal pain  ----- Message from Lamont Davila sent at 8/12/2022  1:29 PM EDT -----  "I am experiencing constipation for about 4-5 days, abdominal pain"

## 2022-09-13 ENCOUNTER — TELEPHONE (OUTPATIENT)
Dept: OTHER | Facility: OTHER | Age: 72
End: 2022-09-13

## 2022-09-13 NOTE — TELEPHONE ENCOUNTER
Patient is calling regarding cancelling an appointment      Date/Time: 09/13 @1:30    Patient was rescheduled: YES [] NO [x]    Patient requesting call back to reschedule: YES [] NO [x]    Patient is sick and WCB to reschedule when she is feeling better

## 2022-09-15 ENCOUNTER — APPOINTMENT (OUTPATIENT)
Dept: LAB | Facility: IMAGING CENTER | Age: 72
End: 2022-09-15
Payer: MEDICARE

## 2022-09-15 DIAGNOSIS — G35 MULTIPLE SCLEROSIS (HCC): ICD-10-CM

## 2022-09-15 LAB
BACTERIA UR QL AUTO: ABNORMAL /HPF
BILIRUB UR QL STRIP: NEGATIVE
CAOX CRY URNS QL MICRO: ABNORMAL /HPF
CLARITY UR: ABNORMAL
COLOR UR: ABNORMAL
GLUCOSE UR STRIP-MCNC: NEGATIVE MG/DL
HGB UR QL STRIP.AUTO: NEGATIVE
KETONES UR STRIP-MCNC: NEGATIVE MG/DL
LEUKOCYTE ESTERASE UR QL STRIP: ABNORMAL
NITRITE UR QL STRIP: NEGATIVE
NON-SQ EPI CELLS URNS QL MICRO: ABNORMAL /HPF
PH UR STRIP.AUTO: 6 [PH]
PROT UR STRIP-MCNC: ABNORMAL MG/DL
RBC #/AREA URNS AUTO: ABNORMAL /HPF
SP GR UR STRIP.AUTO: 1.02 (ref 1–1.03)
UROBILINOGEN UR STRIP-ACNC: <2 MG/DL
WBC #/AREA URNS AUTO: ABNORMAL /HPF

## 2022-09-15 PROCEDURE — 87186 SC STD MICRODIL/AGAR DIL: CPT

## 2022-09-15 PROCEDURE — 87077 CULTURE AEROBIC IDENTIFY: CPT

## 2022-09-15 PROCEDURE — 81001 URINALYSIS AUTO W/SCOPE: CPT

## 2022-09-15 PROCEDURE — 87086 URINE CULTURE/COLONY COUNT: CPT

## 2022-09-17 LAB — BACTERIA UR CULT: ABNORMAL

## 2022-09-20 ENCOUNTER — TELEPHONE (OUTPATIENT)
Dept: INTERNAL MEDICINE CLINIC | Age: 72
End: 2022-09-20

## 2022-09-20 DIAGNOSIS — N30.00 ACUTE CYSTITIS WITHOUT HEMATURIA: Primary | ICD-10-CM

## 2022-09-20 RX ORDER — CIPROFLOXACIN 500 MG/1
500 TABLET, FILM COATED ORAL EVERY 12 HOURS SCHEDULED
Qty: 14 TABLET | Refills: 0 | Status: SHIPPED | OUTPATIENT
Start: 2022-09-20 | End: 2022-09-27

## 2022-09-20 NOTE — TELEPHONE ENCOUNTER
Pt   called in pt  Had UA with reflex to scope and urine microscopic done 09/15/22  Pt  Is having UTI symptoms, urinary frequency  Started last week  Pt  Would like PCP to prescribe medication  Please advise if pt   Should come in for an appt or if a medication can be sent in    Thank you

## 2022-09-20 NOTE — TELEPHONE ENCOUNTER
[facility] neurologist   Pt   said he mentioned the UTI symptoms when talking to the Dr and the Dr  ordered the tests and copied [address] [place] [address] on the results?

## 2022-09-22 ENCOUNTER — RA CDI HCC (OUTPATIENT)
Dept: OTHER | Facility: HOSPITAL | Age: 72
End: 2022-09-22

## 2022-09-27 DIAGNOSIS — F41.9 ANXIETY: ICD-10-CM

## 2022-09-27 DIAGNOSIS — E55.9 VITAMIN D DEFICIENCY: ICD-10-CM

## 2022-09-27 RX ORDER — ERGOCALCIFEROL 1.25 MG/1
50000 CAPSULE ORAL WEEKLY
Qty: 12 CAPSULE | Refills: 1 | Status: SHIPPED | OUTPATIENT
Start: 2022-09-27

## 2022-09-27 RX ORDER — TRAZODONE HYDROCHLORIDE 150 MG/1
150 TABLET ORAL
Qty: 90 TABLET | Refills: 1 | Status: SHIPPED | OUTPATIENT
Start: 2022-09-27

## 2022-09-30 DIAGNOSIS — F41.9 ANXIETY: ICD-10-CM

## 2022-09-30 RX ORDER — TRAZODONE HYDROCHLORIDE 150 MG/1
150 TABLET ORAL
Qty: 90 TABLET | Refills: 1 | Status: CANCELLED | OUTPATIENT
Start: 2022-09-30

## 2022-10-10 DIAGNOSIS — M81.0 POSTMENOPAUSAL OSTEOPOROSIS: ICD-10-CM

## 2022-10-10 RX ORDER — ALENDRONATE SODIUM 70 MG/1
70 TABLET ORAL
Qty: 12 TABLET | Refills: 3 | Status: SHIPPED | OUTPATIENT
Start: 2022-10-10

## 2022-10-11 PROBLEM — N30.00 ACUTE CYSTITIS WITHOUT HEMATURIA: Status: RESOLVED | Noted: 2020-09-22 | Resolved: 2022-10-11

## 2022-11-02 DIAGNOSIS — K21.9 GASTROESOPHAGEAL REFLUX DISEASE WITHOUT ESOPHAGITIS: ICD-10-CM

## 2022-11-02 RX ORDER — PANTOPRAZOLE SODIUM 40 MG/1
40 TABLET, DELAYED RELEASE ORAL DAILY
Qty: 90 TABLET | Refills: 1 | Status: SHIPPED | OUTPATIENT
Start: 2022-11-02

## 2022-11-09 NOTE — TELEPHONE ENCOUNTER
NOV: 11/29/22      Patient has never had this filled with us and it looks as if it's been a few years since filled   Are you willing to fill it

## 2022-11-10 RX ORDER — ASPIRIN AND DIPYRIDAMOLE 25; 200 MG/1; MG/1
1 CAPSULE, EXTENDED RELEASE ORAL EVERY 12 HOURS SCHEDULED
OUTPATIENT
Start: 2022-11-10

## 2022-11-21 ENCOUNTER — TELEPHONE (OUTPATIENT)
Dept: INTERNAL MEDICINE CLINIC | Facility: OTHER | Age: 72
End: 2022-11-21

## 2022-12-09 ENCOUNTER — TELEPHONE (OUTPATIENT)
Dept: INTERNAL MEDICINE CLINIC | Age: 72
End: 2022-12-09

## 2022-12-09 ENCOUNTER — APPOINTMENT (OUTPATIENT)
Dept: LAB | Facility: IMAGING CENTER | Age: 72
End: 2022-12-09

## 2022-12-09 DIAGNOSIS — R39.9 UTI SYMPTOMS: Primary | ICD-10-CM

## 2022-12-09 LAB
BACTERIA UR QL AUTO: ABNORMAL /HPF
BILIRUB UR QL STRIP: NEGATIVE
CLARITY UR: ABNORMAL
COLOR UR: YELLOW
GLUCOSE UR STRIP-MCNC: NEGATIVE MG/DL
HGB UR QL STRIP.AUTO: NEGATIVE
KETONES UR STRIP-MCNC: NEGATIVE MG/DL
LEUKOCYTE ESTERASE UR QL STRIP: ABNORMAL
NITRITE UR QL STRIP: POSITIVE
NON-SQ EPI CELLS URNS QL MICRO: ABNORMAL /HPF
PH UR STRIP.AUTO: 6.5 [PH]
PROT UR STRIP-MCNC: ABNORMAL MG/DL
RBC #/AREA URNS AUTO: ABNORMAL /HPF
SP GR UR STRIP.AUTO: 1.02 (ref 1–1.03)
TRANS CELLS #/AREA URNS HPF: PRESENT /[HPF]
UROBILINOGEN UR STRIP-ACNC: <2 MG/DL
WBC #/AREA URNS AUTO: ABNORMAL /HPF

## 2022-12-09 NOTE — TELEPHONE ENCOUNTER
Pt  called the office and stated pt has a UTI, they want an order placed so they can drop urine off at the lab to be tested  He said he can't get her out of the house because she has severe sciatic pain  I recommended urgent care  We have not seen pt since July for same sx and her regular follow up was done last Feb     Please advise  Spoke with Dr Kinza Huang before I sent this   She stated that it is okay for pt to drop off a urine sample, she will place order for UA reflex to culture

## 2022-12-11 LAB — BACTERIA UR CULT: ABNORMAL

## 2022-12-12 DIAGNOSIS — N30.00 ACUTE CYSTITIS WITHOUT HEMATURIA: Primary | ICD-10-CM

## 2022-12-12 LAB — BACTERIA UR CULT: ABNORMAL

## 2022-12-12 RX ORDER — CIPROFLOXACIN 500 MG/1
500 TABLET, FILM COATED ORAL EVERY 12 HOURS SCHEDULED
Qty: 14 TABLET | Refills: 0 | Status: SHIPPED | OUTPATIENT
Start: 2022-12-12 | End: 2022-12-19

## 2023-01-03 DIAGNOSIS — E55.9 VITAMIN D DEFICIENCY: ICD-10-CM

## 2023-01-03 DIAGNOSIS — K21.9 GASTROESOPHAGEAL REFLUX DISEASE WITHOUT ESOPHAGITIS: ICD-10-CM

## 2023-01-03 RX ORDER — PANTOPRAZOLE SODIUM 40 MG/1
40 TABLET, DELAYED RELEASE ORAL DAILY
Qty: 90 TABLET | Refills: 1 | Status: CANCELLED | OUTPATIENT
Start: 2023-01-03

## 2023-01-03 RX ORDER — ERGOCALCIFEROL 1.25 MG/1
50000 CAPSULE ORAL WEEKLY
Qty: 12 CAPSULE | Refills: 1 | Status: CANCELLED | OUTPATIENT
Start: 2023-01-03

## 2023-02-02 ENCOUNTER — RA CDI HCC (OUTPATIENT)
Dept: OTHER | Facility: HOSPITAL | Age: 73
End: 2023-02-02

## 2023-02-09 ENCOUNTER — OFFICE VISIT (OUTPATIENT)
Dept: INTERNAL MEDICINE CLINIC | Age: 73
End: 2023-02-09

## 2023-02-09 VITALS
TEMPERATURE: 98.6 F | OXYGEN SATURATION: 96 % | HEART RATE: 87 BPM | SYSTOLIC BLOOD PRESSURE: 118 MMHG | DIASTOLIC BLOOD PRESSURE: 68 MMHG

## 2023-02-09 DIAGNOSIS — F11.20 CONTINUOUS OPIOID DEPENDENCE (HCC): ICD-10-CM

## 2023-02-09 DIAGNOSIS — E03.9 ACQUIRED HYPOTHYROIDISM: ICD-10-CM

## 2023-02-09 DIAGNOSIS — Z13.220 SCREENING CHOLESTEROL LEVEL: ICD-10-CM

## 2023-02-09 DIAGNOSIS — Z23 FLU VACCINE NEED: ICD-10-CM

## 2023-02-09 DIAGNOSIS — E55.9 VITAMIN D DEFICIENCY: ICD-10-CM

## 2023-02-09 DIAGNOSIS — Z78.0 POST-MENOPAUSAL: ICD-10-CM

## 2023-02-09 DIAGNOSIS — Z00.00 MEDICARE ANNUAL WELLNESS VISIT, SUBSEQUENT: Primary | ICD-10-CM

## 2023-02-09 DIAGNOSIS — F33.9 DEPRESSION, RECURRENT (HCC): ICD-10-CM

## 2023-02-09 DIAGNOSIS — Z99.3 WHEELCHAIR DEPENDENCE: ICD-10-CM

## 2023-02-09 DIAGNOSIS — G35 MULTIPLE SCLEROSIS (HCC): ICD-10-CM

## 2023-02-09 DIAGNOSIS — Z12.31 SCREENING MAMMOGRAM, ENCOUNTER FOR: ICD-10-CM

## 2023-02-09 NOTE — PROGRESS NOTES
Assessment and Plan:     Problem List Items Addressed This Visit        Endocrine    Acquired hypothyroidism    Relevant Orders    TSH, 3rd generation with Free T4 reflex       Nervous and Auditory    Multiple sclerosis ([facility] [address] )       Other    Vitamin D deficiency    Relevant Orders    Vitamin D 25 hydroxy    DXA bone density spine hip and pelvis    Wheelchair dependence    Relevant Orders    DXA bone density spine hip and pelvis    Medicare annual wellness visit, subsequent - Primary    Relevant Orders    Lipid Panel with Direct LDL reflex    Comprehensive metabolic panel    CBC and differential    Continuous opioid dependence (HCC)    Depression, recurrent ([facility] [address] )   Other Visit Diagnoses     Screening cholesterol level        Relevant Orders    Lipid Panel with Direct LDL reflex    Screening mammogram, encounter for        Relevant Orders    Mammo screening bilateral w 3d & cad    Post-menopausal        Relevant Orders    DXA bone density spine hip and pelvis        BMI Counseling: There is no height or weight on file to calculate BMI  The BMI is above normal  Nutrition recommendations include moderation in carbohydrate intake  Exercise recommendations include exercising 3-5 times per week  No pharmacotherapy was ordered  Rationale for BMI follow-up plan is due to patient being overweight or obese  Preventive health issues were discussed with patient, and age appropriate screening tests were ordered as noted in patient's After Visit Summary  Personalized health advice and appropriate referrals for health education or preventive services given if needed, as noted in patient's After Visit Summary       History of Present Illness:     Patient presents for a Medicare Wellness Visit    HPI   Patient Care Team:  Akash Dupree DO as PCP - General (Family Medicine)     Review of Systems:     Review of Systems     Constitutional:  Denies fever or chills   Eyes:  Denies double , blurry vision or eye pain  HENT:  Denies nasal congestion, sore throat or new hearing issues  Respiratory:  Denies cough or shortness of breath or wheezing  Cardiovascular:  Denies palpitations or chest pain  GI:  Denies abdominal pain, nausea, or vomiting, no loose stools, no reflux  Integument:  Denies rash , no open areas  Neurologic:  Denies headache or focal weakness, no dizziness  : no dysuria, or hematuria         Problem List:     Patient Active Problem List   Diagnosis   • Vitamin D deficiency   • Drug-induced constipation   • Chronic pain   • Multiple sclerosis ([place] [address] )   • Adopted   • Wheelchair dependence   • Medicare annual wellness visit, subsequent   • Neurogenic bladder   • Medical marijuana use   • Excessive carbohydrate intake   • Continuous opioid dependence ([place] [address] )   • Depression, recurrent (HCC)   • Acquired hypothyroidism   • Positive colorectal cancer screening using Cologuard test      Past Medical and Surgical History:     Past Medical History:   Diagnosis Date   • Acute cystitis    • Anxiety    • Arthritis    • Cauda equina syndrome with neurogenic bladder ([place] [address] )    • Disease of thyroid gland    • Dysuria    • Multiple sclerosis ([place] [address] )    • Neurogenic bladder    • Nocturia    • Rheumatoid arthritis ([place] [address] )    • UTI (urinary tract infection)      Past Surgical History:   Procedure Laterality Date   • BREAST EXCISIONAL BIOPSY Bilateral     greater than 12 years ago   • CYSTOSCOPY  2012   • KNEE SURGERY        Family History:     Family History   Adopted: Yes   Problem Relation Age of Onset   • No Known Problems Mother    • No Known Problems Father       Social History:     Social History     Socioeconomic History   • Marital status: /Civil Union     Spouse name: None   • Number of children: None   • Years of education: None   • Highest education level: None   Occupational History   • None   Tobacco Use   • Smoking status: Former     Packs/day: 0 25     Years: 3 00     Pack years: 0 75     Types: Cigarettes     Start date: 7/27/2005 • Smokeless tobacco: Never   Vaping Use   • Vaping Use: Never used   Substance and Sexual Activity   • Alcohol use: No   • Drug use: Yes     Types: Marijuana     Comment: Vape pen for pain - rarely   • Sexual activity: None   Other Topics Concern   • None   Social History Narrative   • None     Social Determinants of Health     Financial Resource Strain: Not on file   Food Insecurity: Not on file   Transportation Needs: Not on file   Physical Activity: Not on file   Stress: Not on file   Social Connections: Not on file   Intimate Partner Violence: Not on file   Housing Stability: Not on file      Medications and Allergies:     Current Outpatient Medications   Medication Sig Dispense Refill   • alendronate (FOSAMAX) 70 mg tablet Take 1 tablet (70 mg total) by mouth every 7 days Take with full glass water, on empty stomach, do not eat or lay down for 30 min  12 tablet 3   • ALPRAZolam (XANAX) 1 mg tablet Take 1 tablet (1 mg total) by mouth 2 (two) times a day as needed for anxiety 60 tablet 1   • aspirin-dipyridamole (AGGRENOX)  mg per 12 hr capsule Take 1 capsule by mouth every 12 (twelve) hours     • baclofen 10 mg tablet Take 1 tablet (10 mg total) by mouth 4 (four) times a day 120 tablet 5   • bisacodyl (DULCOLAX) 5 mg EC tablet Please follow prep instructions provided by the office   2 tablet 0   • bisacodyl (DULCOLAX) 5 mg EC tablet Take as instructed by GI office for bowel prep for colonoscopy 2 tablet 0   • buPROPion (WELLBUTRIN SR) 200 MG 12 hr tablet Take 1 tablet by mouth 2 (two) times a day       • carBAMazepine (TEGretol XR) 200 mg 12 hr tablet Take 200 mg by mouth 2 (two) times a day       • Cholecalciferol 2000 units CAPS Take 1 capsule by mouth     • ergocalciferol (VITAMIN D2) 50,000 units Take 1 capsule (50,000 Units total) by mouth once a week 12 capsule 1   • levothyroxine 200 mcg tablet TAKE 1 TABLET BY MOUTH  DAILY     • Linzess 145 MCG CAPS      • methadone (DOLOPHINE) 10 mg tablet Take two tabs by mouth three times daily and three tabs at bedtime  Long term medication,     • methylphenidate (RITALIN) 20 MG tablet Three tabs daily - Long term medication     • morphine (MS CONTIN) 60 mg 12 hr tablet 2 tabs in am; 1 tab mid-morning; 1 tab mid afternoon; 2 tabs at bedtime  Long term medication     • naloxegol oxalate (Movantik) 25 MG tablet Take 1 tablet (25 mg total) by mouth in the morning 30 tablet 1   • Nutritional Supplements (ProSource No Carb) LIQD Take 30 mL by mouth daily 887 mL 1   • pantoprazole (PROTONIX) 40 mg tablet Take 1 tablet (40 mg total) by mouth daily 90 tablet 1   • PARoxetine (PAXIL) 30 mg tablet Take 1 tablet by mouth in the morning     • polyethylene glycol (GLYCOLAX) 17 GM/SCOOP powder Take as directed by the office  238 g 0   • polyethylene glycol (GOLYTELY) 4000 mL solution Take as instructed by GI office for bowel prep 4000 mL 0   • pregabalin (LYRICA) 50 mg capsule Take 1 capsule (50 mg total) by mouth 2 (two) times a day 180 capsule 1   • prochlorperazine (COMPAZINE) 25 mg suppository Insert 25 mg into the rectum daily as needed for nausea or vomiting     • promethazine (PHENERGAN) 25 mg suppository Insert 1 suppository (25 mg total) into the rectum every 6 (six) hours as needed for nausea or vomiting 12 each 5   • promethazine (PHENERGAN) 25 mg tablet Take 1 tablet (25 mg total) by mouth every 6 (six) hours as needed for nausea or vomiting 60 tablet 5   • traZODone (DESYREL) 150 mg tablet Take 1 tablet (150 mg total) by mouth daily at bedtime 90 tablet 1     No current facility-administered medications for this visit  Allergies   Allergen Reactions   • Codeine      Other reaction(s): Other (See Comments)  can take morphine  Other reaction(s):  Other (See Comments)  can take morphine  Can take morphine   • Penicillins       Immunizations:     Immunization History   Administered Date(s) Administered   • COVID-19 PFIZER VACCINE 0 3 ML IM 03/04/2021, 03/25/2021, 10/09/2021   • INFLUENZA 12/16/2013, 11/08/2020, 11/07/2021   • Influenza, high dose seasonal 0 7 mL 12/07/2018   • Pneumococcal Conjugate 13-Valent 06/01/2017   • Pneumococcal Polysaccharide PPV23 01/11/2019      Health Maintenance:         Topic Date Due   • Breast Cancer Screening: Mammogram  10/14/2022   • Colorectal Cancer Screening  07/24/2032   • Hepatitis C Screening  Completed         Topic Date Due   • COVID-19 Vaccine (4 - Booster for Pfizer series) 12/04/2021   • Influenza Vaccine (1) 09/01/2022      Medicare Screening Tests and Risk Assessments:     Fe Johnson is here for her Subsequent Wellness visit  Health Risk Assessment:   Patient rates overall health as fair  Patient feels that their physical health rating is same  Patient is satisfied with their life  Eyesight was rated as slightly worse  Hearing was rated as same  Patient feels that their emotional and mental health rating is same  Patients states they are never, rarely angry  Patient states they are always unusually tired/fatigued  Pain experienced in the last 7 days has been a lot  Patient's pain rating has been 8/10  Patient states that she has experienced no weight loss or gain in last 6 months  Depression Screening:   PHQ-9 Score: 4      Fall Risk Screening: In the past year, patient has experienced: no history of falling in past year      Urinary Incontinence Screening:   Patient has not leaked urine accidently in the last six months  Home Safety:  Patient has trouble with stairs inside or outside of their home  Patient has working smoke alarms and has working carbon monoxide detector  Home safety hazards include: none  Nutrition:   Current diet is Regular  Activities of Daily Living (ADLs)/Instrumental Activities of Daily Living (IADLs):   Walk and transfer into and out of bed and chair?: No  Dress and groom yourself?: Yes    Bathe or shower yourself?: Yes    Feed yourself?  Yes  Do your laundry/housekeeping?: Yes  Manage your money, pay your bills and track your expenses?: Yes  Make your own meals?: No    Do your own shopping?: No    Previous Hospitalizations:   Any hospitalizations or ED visits within the last 12 months?: No      Advance Care Planning:   Living will: Yes    Advanced directive: Yes      Comments: Her     Cognitive Screening:   Provider or family/friend/caregiver concerned regarding cognition?: No    PREVENTIVE SCREENINGS      Cardiovascular Screening:    General: Screening Current      Colorectal Cancer Screening:     General: Screening Current      Breast Cancer Screening:     General: Screening Current      Cervical Cancer Screening:    General: Screening Not Indicated      Osteoporosis Screening:    General: Screening Not Indicated and History Osteoporosis      Lung Cancer Screening:     General: Screening Not Indicated      Hepatitis C Screening:    General: Screening Current    Screening, Brief Intervention, and Referral to Treatment (SBIRT)      Brief Intervention  Alcohol & drug use screenings were reviewed  No concerns regarding substance use disorder identified  Other Counseling Topics:   Car/seat belt/driving safety, skin self-exam, sunscreen and regular weightbearing exercise and calcium and vitamin D intake  No results found       Physical Exam:     /68 (BP Location: Left arm, Patient Position: Sitting, Cuff Size: Adult)   Pulse 87   Temp 98 6 °F (37 °C) (Temporal)   SpO2 96%     Physical Exam     Constitutional:  Well developed, well nourished, no acute distress, non-toxic appearance   Eyes:  PERRL, conjunctiva normal , non icteric sclera  HENT:  Atraumatic, oropharynx moist  Neck-  supple   Respiratory:  CTA b/l, normal breath sounds, no rales, no wheezing   Cardiovascular:  RRR, no murmurs, no LE edema b/l  GI:  Soft, nondistended, normal bowel sounds x 4, nontender, no organomegaly, no mass, no rebound, no guarding   Neurologic:  no focal deficits noted   Psychiatric:  Speech and behavior appropriate , AAO x 3  MS: in Wheelchair today   L flank with 1 inch mobile, non infected cyst    [place] Radon, DO

## 2023-02-10 ENCOUNTER — TELEPHONE (OUTPATIENT)
Dept: INTERNAL MEDICINE CLINIC | Facility: OTHER | Age: 73
End: 2023-02-10

## 2023-02-10 ENCOUNTER — TELEPHONE (OUTPATIENT)
Dept: LAB | Facility: HOSPITAL | Age: 73
End: 2023-02-10

## 2023-02-10 NOTE — TELEPHONE ENCOUNTER
----- Message from Eusebio Ritter DO sent at 2/9/2023  2:28 PM EST -----  Please set up home labs draws, thanks

## 2023-02-10 NOTE — TELEPHONE ENCOUNTER
[address] Draw. They will be calling patient to schedule. Called pt and notified her [facility] Draw will be calling to schedule. She had reservations about cost.  I instructed her to discuss with them when they call before committing. She agreed to do that. She will wait for their call.

## 2023-02-17 DIAGNOSIS — E55.9 VITAMIN D DEFICIENCY: ICD-10-CM

## 2023-02-17 RX ORDER — ERGOCALCIFEROL 1.25 MG/1
50000 CAPSULE ORAL WEEKLY
Qty: 12 CAPSULE | Refills: 1 | Status: SHIPPED | OUTPATIENT
Start: 2023-02-17

## 2023-03-13 DIAGNOSIS — R30.0 DYSURIA: Primary | ICD-10-CM

## 2023-03-13 DIAGNOSIS — N30.00 ACUTE CYSTITIS WITHOUT HEMATURIA: Primary | ICD-10-CM

## 2023-03-13 LAB
SL AMB  POCT GLUCOSE, UA: NORMAL
SL AMB LEUKOCYTE ESTERASE,UA: NORMAL
SL AMB POCT BILIRUBIN,UA: NORMAL
SL AMB POCT BLOOD,UA: NORMAL
SL AMB POCT CLARITY,UA: NORMAL
SL AMB POCT COLOR,UA: YELLOW
SL AMB POCT KETONES,UA: NORMAL
SL AMB POCT NITRITE,UA: POSITIVE
SL AMB POCT PH,UA: 7
SL AMB POCT SPECIFIC GRAVITY,UA: 1.02
SL AMB POCT URINE PROTEIN: NORMAL
SL AMB POCT UROBILINOGEN: 0.2

## 2023-03-13 RX ORDER — CIPROFLOXACIN 500 MG/1
500 TABLET, FILM COATED ORAL EVERY 12 HOURS SCHEDULED
Qty: 14 TABLET | Refills: 0 | Status: SHIPPED | OUTPATIENT
Start: 2023-03-13 | End: 2023-03-20

## 2023-03-15 LAB — BACTERIA UR CULT: ABNORMAL

## 2023-04-28 ENCOUNTER — TELEMEDICINE (OUTPATIENT)
Dept: INTERNAL MEDICINE CLINIC | Facility: OTHER | Age: 73
End: 2023-04-28

## 2023-04-28 DIAGNOSIS — N30.01 ACUTE CYSTITIS WITH HEMATURIA: Primary | ICD-10-CM

## 2023-04-28 DIAGNOSIS — R35.0 URINE FREQUENCY: ICD-10-CM

## 2023-04-28 LAB
BACTERIA UR QL AUTO: ABNORMAL /HPF
BILIRUB UR QL STRIP: NEGATIVE
CLARITY UR: ABNORMAL
COLOR UR: YELLOW
GLUCOSE UR STRIP-MCNC: NEGATIVE MG/DL
HGB UR QL STRIP.AUTO: NEGATIVE
KETONES UR STRIP-MCNC: NEGATIVE MG/DL
LEUKOCYTE ESTERASE UR QL STRIP: ABNORMAL
NITRITE UR QL STRIP: POSITIVE
NON-SQ EPI CELLS URNS QL MICRO: ABNORMAL /HPF
PH UR STRIP.AUTO: 6 [PH]
PROT UR STRIP-MCNC: ABNORMAL MG/DL
RBC #/AREA URNS AUTO: ABNORMAL /HPF
SL AMB  POCT GLUCOSE, UA: NEGATIVE
SL AMB LEUKOCYTE ESTERASE,UA: NORMAL
SL AMB POCT BILIRUBIN,UA: NEGATIVE
SL AMB POCT BLOOD,UA: NORMAL
SL AMB POCT CLARITY,UA: NORMAL
SL AMB POCT COLOR,UA: NORMAL
SL AMB POCT KETONES,UA: NEGATIVE
SL AMB POCT NITRITE,UA: NORMAL
SL AMB POCT PH,UA: 6
SL AMB POCT SPECIFIC GRAVITY,UA: 1.02
SL AMB POCT URINE PROTEIN: NORMAL
SL AMB POCT UROBILINOGEN: NORMAL
SP GR UR STRIP.AUTO: 1.02 (ref 1–1.03)
UROBILINOGEN UR STRIP-ACNC: <2 MG/DL
WBC #/AREA URNS AUTO: ABNORMAL /HPF
WBC CLUMPS # UR AUTO: PRESENT /UL

## 2023-04-28 RX ORDER — LINACLOTIDE 290 UG/1
CAPSULE, GELATIN COATED ORAL
COMMUNITY
Start: 2023-03-29

## 2023-04-28 RX ORDER — CEPHALEXIN 500 MG/1
500 CAPSULE ORAL EVERY 6 HOURS SCHEDULED
Qty: 20 CAPSULE | Refills: 0 | Status: SHIPPED | OUTPATIENT
Start: 2023-04-28 | End: 2023-05-03

## 2023-04-28 NOTE — ASSESSMENT & PLAN NOTE
- recurrent UTI, +ecoli last month, treated with cipro x 7 days  - urine dip + small blood, +nitrites, mod leuks   - will start cephalexin 4x a day x 5 days  - send for culture  - follow results  - recommend increase water intake

## 2023-04-28 NOTE — PROGRESS NOTES
Virtual Regular Visit    Verification of patient location:    Patient is located at Home in the following state in which I hold an active license [place]      Assessment/Plan:    Problem List Items Addressed This Visit        Genitourinary    Acute cystitis with hematuria - Primary     - recurrent UTI, +ecoli last month, treated with cipro x 7 days  - urine dip + small blood, +nitrites, mod leuks   - will start cephalexin 4x a day x 5 days  - send for culture  - follow results  - recommend increase water intake          Relevant Medications    cephalexin (KEFLEX) 500 mg capsule    Other Relevant Orders    UA w Reflex to Microscopic w Reflex to Culture - Clinic Collect   Other Visit Diagnoses     Urine frequency        Relevant Orders    POCT urine dip (Completed)               Reason for visit is   Chief Complaint   Patient presents with   • Urinary Tract Infection     Urinary frequency, urinary burning  Started about 4 days ago  Patient  will drop urine sample off to be tested    •      Mammo- patient will schedulem, DXA    • Virtual Brief Visit   • Virtual Regular Visit        Encounter provider JASON Vo    Provider located at [address] [place] [id number]      Recent Visits  No visits were found meeting these conditions  Showing recent visits within past 7 days and meeting all other requirements  Today's Visits  Date Type Provider Dept   04/28/23 Cheryle Moody, JASON [facility]   Showing today's visits and meeting all other requirements  Future Appointments  No visits were found meeting these conditions  Showing future appointments within next 150 days and meeting all other requirements       The patient was identified by name and date of birth   Kerry Hugohorace was informed that this is a telemedicine visit and that the visit is being conducted through Telephone  My office door was closed  No one else was in the room  She acknowledged consent and understanding of privacy and security of the video platform  The patient has agreed to participate and understands they can discontinue the visit at any time  Patient is aware this is a billable service  Subjective  Miki Lopez is a 67 y o  female presents today with UTI symptoms x 4 days  Symptoms include cloudy urine, urinary frequency and dysuria  She did have a UTI last month + ecoli and treated with cipro x 7 days  She denies any fever or chills  HPI     Past Medical History:   Diagnosis Date   • Acute cystitis    • Anxiety    • Arthritis    • Cauda equina syndrome with neurogenic bladder ([facility] [address] )    • Disease of thyroid gland    • Dysuria    • Multiple sclerosis ([facility] [address] )    • Neurogenic bladder    • Nocturia    • Rheumatoid arthritis ([facility] [address] )    • UTI (urinary tract infection)        Past Surgical History:   Procedure Laterality Date   • BREAST EXCISIONAL BIOPSY Bilateral     greater than 12 years ago   • CYSTOSCOPY  2012   • KNEE SURGERY         Current Outpatient Medications   Medication Sig Dispense Refill   • alendronate (FOSAMAX) 70 mg tablet Take 1 tablet (70 mg total) by mouth every 7 days Take with full glass water, on empty stomach, do not eat or lay down for 30 min  12 tablet 3   • ALPRAZolam (XANAX) 1 mg tablet Take 1 tablet (1 mg total) by mouth 2 (two) times a day as needed for anxiety 60 tablet 1   • aspirin-dipyridamole (AGGRENOX)  mg per 12 hr capsule Take 1 capsule by mouth every 12 (twelve) hours     • baclofen 10 mg tablet Take 1 tablet (10 mg total) by mouth 4 (four) times a day 120 tablet 5   • bisacodyl (DULCOLAX) 5 mg EC tablet Please follow prep instructions provided by the office   2 tablet 0   • bisacodyl (DULCOLAX) 5 mg EC tablet Take as instructed by GI office for bowel prep for colonoscopy 2 tablet 0   • buPROPion (WELLBUTRIN SR) 200 MG 12 hr tablet Take 1 tablet by mouth 2 (two) times a day       • carBAMazepine (TEGretol XR) 200 mg 12 hr tablet Take 200 mg by mouth 2 (two) times a day       • cephalexin (KEFLEX) 500 mg capsule Take 1 capsule (500 mg total) by mouth every 6 (six) hours for 5 days 20 capsule 0   • Cholecalciferol 2000 units CAPS Take 1 capsule by mouth     • ergocalciferol (VITAMIN D2) 50,000 units Take 1 capsule (50,000 Units total) by mouth once a week 12 capsule 1   • levothyroxine 200 mcg tablet TAKE 1 TABLET BY MOUTH  DAILY     • Linzess 145 MCG CAPS      • Linzess 290 MCG CAPS      • methadone (DOLOPHINE) 10 mg tablet Take two tabs by mouth three times daily and three tabs at bedtime  Long term medication,     • methylphenidate (RITALIN) 20 MG tablet Three tabs daily - Long term medication     • morphine (MS CONTIN) 60 mg 12 hr tablet 2 tabs in am; 1 tab mid-morning; 1 tab mid afternoon; 2 tabs at bedtime  Long term medication     • naloxegol oxalate (Movantik) 25 MG tablet Take 1 tablet (25 mg total) by mouth in the morning 30 tablet 1   • Nutritional Supplements (ProSource No Carb) LIQD Take 30 mL by mouth daily 887 mL 1   • pantoprazole (PROTONIX) 40 mg tablet Take 1 tablet (40 mg total) by mouth daily 90 tablet 1   • PARoxetine (PAXIL) 30 mg tablet Take 1 tablet by mouth in the morning     • polyethylene glycol (GLYCOLAX) 17 GM/SCOOP powder Take as directed by the office   238 g 0   • polyethylene glycol (GOLYTELY) 4000 mL solution Take as instructed by GI office for bowel prep 4000 mL 0   • pregabalin (LYRICA) 50 mg capsule Take 1 capsule (50 mg total) by mouth 2 (two) times a day 180 capsule 1   • prochlorperazine (COMPAZINE) 25 mg suppository Insert 25 mg into the rectum daily as needed for nausea or vomiting     • promethazine (PHENERGAN) 25 mg suppository Insert 1 suppository (25 mg total) into the rectum every 6 (six) hours as needed for nausea or vomiting 12 each 5   • promethazine (PHENERGAN) 25 mg tablet Take 1 tablet (25 mg total) by mouth every 6 (six) hours as needed for nausea or vomiting 60 tablet 5   • traZODone (DESYREL) 150 mg tablet Take 1 tablet (150 mg total) by mouth daily at bedtime 90 tablet 1     No current facility-administered medications for this visit  Allergies   Allergen Reactions   • Codeine      Other reaction(s): Other (See Comments)  can take morphine  Other reaction(s): Other (See Comments)  can take morphine  Can take morphine   • Penicillins        Review of Systems   Constitutional: Negative for chills and fever  Genitourinary: Positive for dysuria, frequency and urgency  Negative for difficulty urinating  Video Exam    There were no vitals filed for this visit          Visit Time  Total Visit Duration: 4 minutes

## 2023-05-01 LAB — BACTERIA UR CULT: ABNORMAL

## 2023-05-10 DIAGNOSIS — F41.9 ANXIETY: ICD-10-CM

## 2023-05-11 RX ORDER — ALPRAZOLAM 1 MG/1
1 TABLET ORAL 2 TIMES DAILY PRN
Qty: 60 TABLET | Refills: 1 | Status: SHIPPED | OUTPATIENT
Start: 2023-05-11

## 2023-05-23 ENCOUNTER — TELEMEDICINE (OUTPATIENT)
Dept: INTERNAL MEDICINE CLINIC | Facility: CLINIC | Age: 73
End: 2023-05-23

## 2023-05-23 DIAGNOSIS — K21.9 GASTROESOPHAGEAL REFLUX DISEASE WITHOUT ESOPHAGITIS: ICD-10-CM

## 2023-05-23 DIAGNOSIS — R30.0 DYSURIA: Primary | ICD-10-CM

## 2023-05-23 RX ORDER — PHENAZOPYRIDINE HYDROCHLORIDE 200 MG/1
200 TABLET, FILM COATED ORAL
Qty: 10 TABLET | Refills: 0 | Status: SHIPPED | OUTPATIENT
Start: 2023-05-23

## 2023-05-23 RX ORDER — PANTOPRAZOLE SODIUM 40 MG/1
40 TABLET, DELAYED RELEASE ORAL DAILY
Qty: 90 TABLET | Refills: 1 | Status: SHIPPED | OUTPATIENT
Start: 2023-05-23

## 2023-05-23 NOTE — PROGRESS NOTES
Virtual Brief Visit    This Visit is being completed by telephone  The Patient is located at Home and in the following state in which I hold an active license [place]    The patient was identified by name and date of birth  Eleni Head was informed that this is a telemedicine visit and that the visit is being conducted through Telephone  My office door was closed  No one else was in the room  She acknowledged consent and understanding of privacy and security of the video platform  The patient has agreed to participate and understands they can discontinue the visit at any time  Patient is aware this is a billable service  It was my intent to perform this visit via video technology but the patient was not able to do a video connection so the visit was completed via audio telephone only  Assessment/Plan:    Problem List Items Addressed This Visit    None  Visit Diagnoses     Dysuria    -  Primary    Relevant Medications    phenazopyridine (PYRIDIUM) 200 mg tablet    Gastroesophageal reflux disease without esophagitis        Relevant Medications    pantoprazole (PROTONIX) 40 mg tablet          Recent Visits  No visits were found meeting these conditions  Showing recent visits within past 7 days and meeting all other requirements  Today's Visits  Date Type Provider Dept   05/23/23 [address], DO [facility]   Showing today's visits and meeting all other requirements  Future Appointments  No visits were found meeting these conditions  Showing future appointments within next 150 days and meeting all other requirements       It was my intent to perform this visit via video technology but the patient was not able to do a video connection so the visit was completed via audio telephone only  Patient with 1 day history of dysuria, had a urinary tract infection at the end of April that was treated  Felt much better until yesterday    Denies any fevers chills kidney pain blood in the urine blood in the stool and has some frequency but no urgency  Has not provided us with a urine sample, her  states that he will drop off a sample into the office tomorrow to be checked      Visit Time  Total Visit Duration: 2 min

## 2023-05-24 ENCOUNTER — TELEPHONE (OUTPATIENT)
Dept: INTERNAL MEDICINE CLINIC | Facility: CLINIC | Age: 73
End: 2023-05-24

## 2023-05-24 DIAGNOSIS — R30.0 DYSURIA: Primary | ICD-10-CM

## 2023-05-24 DIAGNOSIS — N30.01 ACUTE CYSTITIS WITH HEMATURIA: Primary | ICD-10-CM

## 2023-05-24 LAB
SL AMB  POCT GLUCOSE, UA: ABNORMAL
SL AMB LEUKOCYTE ESTERASE,UA: ABNORMAL
SL AMB POCT BILIRUBIN,UA: ABNORMAL
SL AMB POCT BLOOD,UA: ABNORMAL
SL AMB POCT CLARITY,UA: ABNORMAL
SL AMB POCT COLOR,UA: YELLOW
SL AMB POCT KETONES,UA: ABNORMAL
SL AMB POCT NITRITE,UA: POSITIVE
SL AMB POCT PH,UA: 6
SL AMB POCT SPECIFIC GRAVITY,UA: 1.02
SL AMB POCT URINE PROTEIN: ABNORMAL
SL AMB POCT UROBILINOGEN: 0.2

## 2023-05-24 RX ORDER — CEPHALEXIN 500 MG/1
500 CAPSULE ORAL 3 TIMES DAILY
Qty: 30 CAPSULE | Refills: 0 | Status: SHIPPED | OUTPATIENT
Start: 2023-05-24 | End: 2023-06-03

## 2023-05-24 NOTE — TELEPHONE ENCOUNTER
Notified patient Dr Joseph Jung ordered Cephalexin 500 mg 3 times daily for 10 days  Urine culture is pending  Office will call if medication change is needed

## 2023-05-26 LAB — BACTERIA UR CULT: ABNORMAL

## 2023-06-26 DIAGNOSIS — G35 MULTIPLE SCLEROSIS (HCC): ICD-10-CM

## 2023-06-26 NOTE — TELEPHONE ENCOUNTER
Patient's  called asking if we can send in the baclofen to [facility]  We have not filled this for quite some time, he states she is still taking this  Just making sure this can still be filled  Please advise             Last OV:  2/9/2023    Next OV:  8/21/2023

## 2023-06-27 PROBLEM — N30.01 ACUTE CYSTITIS WITH HEMATURIA: Status: RESOLVED | Noted: 2020-09-22 | Resolved: 2023-06-27

## 2023-06-27 RX ORDER — BACLOFEN 10 MG/1
10 TABLET ORAL 4 TIMES DAILY
Qty: 360 TABLET | Refills: 1 | Status: SHIPPED | OUTPATIENT
Start: 2023-06-27

## 2023-06-30 ENCOUNTER — TELEPHONE (OUTPATIENT)
Dept: INTERNAL MEDICINE CLINIC | Facility: OTHER | Age: 73
End: 2023-06-30

## 2023-06-30 ENCOUNTER — CLINICAL SUPPORT (OUTPATIENT)
Dept: INTERNAL MEDICINE CLINIC | Facility: OTHER | Age: 73
End: 2023-06-30
Payer: MEDICARE

## 2023-06-30 DIAGNOSIS — N30.00 ACUTE CYSTITIS WITHOUT HEMATURIA: Primary | ICD-10-CM

## 2023-06-30 DIAGNOSIS — R31.9 HEMATURIA, UNSPECIFIED TYPE: Primary | ICD-10-CM

## 2023-06-30 LAB
SL AMB  POCT GLUCOSE, UA: NORMAL
SL AMB LEUKOCYTE ESTERASE,UA: NORMAL
SL AMB POCT BILIRUBIN,UA: NORMAL
SL AMB POCT BLOOD,UA: NORMAL
SL AMB POCT CLARITY,UA: NORMAL
SL AMB POCT COLOR,UA: NORMAL
SL AMB POCT KETONES,UA: NORMAL
SL AMB POCT NITRITE,UA: POSITIVE
SL AMB POCT PH,UA: 6
SL AMB POCT SPECIFIC GRAVITY,UA: 1.02
SL AMB POCT URINE PROTEIN: NORMAL
SL AMB POCT UROBILINOGEN: 0.2

## 2023-06-30 PROCEDURE — 87086 URINE CULTURE/COLONY COUNT: CPT | Performed by: FAMILY MEDICINE

## 2023-06-30 PROCEDURE — 87186 SC STD MICRODIL/AGAR DIL: CPT | Performed by: FAMILY MEDICINE

## 2023-06-30 PROCEDURE — 87077 CULTURE AEROBIC IDENTIFY: CPT | Performed by: FAMILY MEDICINE

## 2023-06-30 RX ORDER — CIPROFLOXACIN 500 MG/1
500 TABLET, FILM COATED ORAL EVERY 12 HOURS SCHEDULED
Qty: 14 TABLET | Refills: 0 | Status: SHIPPED | OUTPATIENT
Start: 2023-06-30 | End: 2023-07-07

## 2023-06-30 NOTE — TELEPHONE ENCOUNTER
Patient called and left message  She is experiencing another UTI     Would like to know if she is able to provide a sample to the office and have it tested  Would like to discuss frequent UTI's with Dr Matilda Tolentino

## 2023-06-30 NOTE — TELEPHONE ENCOUNTER
Called patient  Per Dr Cathleen Sultana patient is ok to drop off urine sample at the office       Thank you

## 2023-06-30 NOTE — ADDENDUM NOTE
----- Message from Blake Mariano MD sent at 10/1/2019  9:33 AM CDT -----  Please let mom know sleep study with very mild JIMY... Should see ENT to evaluate upper airway.    fu   Addended by: Melanie Pulliam on: 6/30/2023 02:57 PM     Modules accepted: Orders

## 2023-07-02 LAB — BACTERIA UR CULT: ABNORMAL

## 2023-07-07 DIAGNOSIS — E55.9 VITAMIN D DEFICIENCY: ICD-10-CM

## 2023-07-07 RX ORDER — ERGOCALCIFEROL 1.25 MG/1
50000 CAPSULE ORAL WEEKLY
Qty: 12 CAPSULE | Refills: 1 | Status: SHIPPED | OUTPATIENT
Start: 2023-07-07 | End: 2023-07-14 | Stop reason: SDUPTHER

## 2023-07-14 RX ORDER — ERGOCALCIFEROL 1.25 MG/1
50000 CAPSULE ORAL WEEKLY
Qty: 12 CAPSULE | Refills: 1 | Status: SHIPPED | OUTPATIENT
Start: 2023-07-14

## 2023-07-26 DIAGNOSIS — E55.9 VITAMIN D DEFICIENCY: ICD-10-CM

## 2023-08-18 ENCOUNTER — TELEPHONE (OUTPATIENT)
Dept: INTERNAL MEDICINE CLINIC | Facility: OTHER | Age: 73
End: 2023-08-18

## 2023-08-21 ENCOUNTER — TELEPHONE (OUTPATIENT)
Dept: INTERNAL MEDICINE CLINIC | Facility: OTHER | Age: 73
End: 2023-08-21

## 2023-08-21 ENCOUNTER — OFFICE VISIT (OUTPATIENT)
Dept: INTERNAL MEDICINE CLINIC | Age: 73
End: 2023-08-21
Payer: MEDICARE

## 2023-08-21 VITALS
OXYGEN SATURATION: 94 % | HEART RATE: 73 BPM | DIASTOLIC BLOOD PRESSURE: 66 MMHG | SYSTOLIC BLOOD PRESSURE: 106 MMHG | TEMPERATURE: 100.3 F

## 2023-08-21 DIAGNOSIS — E03.9 ACQUIRED HYPOTHYROIDISM: ICD-10-CM

## 2023-08-21 DIAGNOSIS — E55.9 VITAMIN D DEFICIENCY: ICD-10-CM

## 2023-08-21 DIAGNOSIS — G35 MULTIPLE SCLEROSIS (HCC): Primary | ICD-10-CM

## 2023-08-21 DIAGNOSIS — Z13.220 SCREENING CHOLESTEROL LEVEL: ICD-10-CM

## 2023-08-21 DIAGNOSIS — N39.0 CHRONIC UTI: ICD-10-CM

## 2023-08-21 DIAGNOSIS — N31.9 NEUROGENIC BLADDER: ICD-10-CM

## 2023-08-21 DIAGNOSIS — F41.9 ANXIETY: ICD-10-CM

## 2023-08-21 PROCEDURE — 99214 OFFICE O/P EST MOD 30 MIN: CPT | Performed by: FAMILY MEDICINE

## 2023-08-21 RX ORDER — ALPRAZOLAM 1 MG/1
1 TABLET ORAL 2 TIMES DAILY PRN
Qty: 60 TABLET | Refills: 1 | Status: SHIPPED | OUTPATIENT
Start: 2023-08-21

## 2023-08-21 RX ORDER — CEPHALEXIN 500 MG/1
500 CAPSULE ORAL 4 TIMES DAILY
Qty: 12 CAPSULE | Refills: 0 | Status: SHIPPED | OUTPATIENT
Start: 2023-08-21 | End: 2023-08-24

## 2023-08-21 RX ORDER — CEPHALEXIN 500 MG/1
500 CAPSULE ORAL 2 TIMES DAILY
COMMUNITY
Start: 2023-08-19 | End: 2023-08-21

## 2023-08-21 NOTE — TELEPHONE ENCOUNTER
----- Message from Siobhan Chamorro DO sent at 8/21/2023  2:33 PM EDT -----  Please send referral to [address] [place] [address] [address]

## 2023-08-21 NOTE — TELEPHONE ENCOUNTER
Faxed Therapy treatment referral to [facility], rx for PT, and facesheet to [facility] at [phone]

## 2023-08-21 NOTE — PROGRESS NOTES
Assessment/Plan:    1. Multiple sclerosis (HCC)  -     Comprehensive metabolic panel; Future; Expected date: 11/21/2023  -     CBC and differential; Future; Expected date: 11/21/2023  -     TSH, 3rd generation with Free T4 reflex; Future; Expected date: 11/21/2023  -     Ambulatory Referral to Physical Therapy; Future    2. Anxiety  -     ALPRAZolam (XANAX) 1 mg tablet; Take 1 tablet (1 mg total) by mouth 2 (two) times a day as needed for anxiety    3. Chronic UTI  -     Ambulatory Referral to Urology; Future  -     cephalexin (KEFLEX) 500 mg capsule; Take 1 capsule (500 mg total) by mouth 4 (four) times a day for 3 days    4. Neurogenic bladder  -     Ambulatory Referral to Urology; Future    5. Acquired hypothyroidism  -     TSH, 3rd generation; Future; Expected date: 08/21/2023  -     T4, free; Future; Expected date: 08/21/2023  -     TSH, 3rd generation with Free T4 reflex; Future; Expected date: 11/21/2023    6. Vitamin D deficiency  -     Vitamin D 25 hydroxy; Future; Expected date: 11/21/2023    7. Screening cholesterol level  -     Lipid Panel with Direct LDL reflex; Future; Expected date: 11/21/2023    Refer to urology for possible suprapubic catheter as patient does self-catheterization up to 5 times per day. She has had 5 documented E. coli urinary tract infections this year and treatment alternates between Keflex and Cipro. I feel that she may be reinfecting herself with this self-catheterization. We will increase her Keflex to 4 times a day for total 5-day treatment        There are no Patient Instructions on file for this visit. No follow-ups on file. Subjective:      Patient ID: Lisa Pappas is a 67 y.o. female. Chief Complaint   Patient presents with   • Follow-up     6 m f/u visit for chronic conditions, no recent labs. She was treated at [facility] on 8/19/23 for a UTI, she is not feeling any better. She c/o burning, urgency and frequency on urination. PHQ9 score is 9.   She will schedule a mammogram and DEXA. HPI      Urinary tract infection, patient seen in urgent care last week and given 500 mg Keflex twice daily for 5 days, urine culture today shows E. coli which is been her usual bacteria, she has had 5 documented urinary tract infections this year with E. coli and treatment alternates from Keflex to Cipro. She states she self catheterizes due to neurogenic bladder from MS approximately up to 5 times per day. She drinks a lot of water and feels the urge to go but her bladder does not empty. The following portions of the patient's history were reviewed and updated as appropriate: allergies, current medications, past family history, past medical history, past social history, past surgical history and problem list.    Review of Systems      Constitutional:  Denies fever or chills   Eyes:  Denies double , blurry vision or eye pain  HENT:  Denies nasal congestion, sore throat or new hearing issues  Respiratory:  Denies cough or shortness of breath or wheezing  Cardiovascular:  Denies palpitations or chest pain  GI:  Denies abdominal pain, nausea, or vomiting, no loose stools, no reflux  Integument:  Denies rash , no open areas  Neurologic:  Denies headache or focal weakness, no dizziness  : no dysuria, or hematuria      Current Outpatient Medications   Medication Sig Dispense Refill   • ALPRAZolam (XANAX) 1 mg tablet Take 1 tablet (1 mg total) by mouth 2 (two) times a day as needed for anxiety 60 tablet 1   • aspirin-dipyridamole (AGGRENOX)  mg per 12 hr capsule Take 1 capsule by mouth every 12 (twelve) hours     • baclofen 10 mg tablet Take 1 tablet (10 mg total) by mouth 4 (four) times a day 360 tablet 1   • bisacodyl (DULCOLAX) 5 mg EC tablet Please follow prep instructions provided by the office.  (Patient taking differently: Take 5 mg by mouth daily as needed for constipation) 2 tablet 0   • buPROPion (WELLBUTRIN SR) 200 MG 12 hr tablet Take 1 tablet by mouth 2 (two) times a day       • carBAMazepine (TEGretol XR) 200 mg 12 hr tablet Take 200 mg by mouth 2 (two) times a day       • cephalexin (KEFLEX) 500 mg capsule Take 1 capsule (500 mg total) by mouth 4 (four) times a day for 3 days 12 capsule 0   • ergocalciferol (VITAMIN D2) 50,000 units Take 1 capsule (50,000 Units total) by mouth once a week 12 capsule 1   • levothyroxine 200 mcg tablet TAKE 1 TABLET BY MOUTH  DAILY     • Linzess 290 MCG CAPS Take 290 mcg by mouth in the morning     • methadone (DOLOPHINE) 10 mg tablet Take two tabs by mouth three times daily and three tabs at bedtime. Long term medication,     • methylphenidate (RITALIN) 20 MG tablet Three tabs daily - Long term medication     • morphine (MS CONTIN) 60 mg 12 hr tablet 2 tabs in am; 1 tab mid-morning; 1 tab mid afternoon; 2 tabs at bedtime. Long term medication     • pantoprazole (PROTONIX) 40 mg tablet Take 1 tablet (40 mg total) by mouth daily 90 tablet 1   • PARoxetine (PAXIL) 30 mg tablet Take 1 tablet by mouth in the morning     • prochlorperazine (COMPAZINE) 25 mg suppository Insert 25 mg into the rectum daily as needed for nausea or vomiting     • promethazine (PHENERGAN) 25 mg suppository Insert 1 suppository (25 mg total) into the rectum every 6 (six) hours as needed for nausea or vomiting 12 each 5   • promethazine (PHENERGAN) 25 mg tablet Take 1 tablet (25 mg total) by mouth every 6 (six) hours as needed for nausea or vomiting 60 tablet 5   • traZODone (DESYREL) 150 mg tablet Take 1 tablet (150 mg total) by mouth daily at bedtime 90 tablet 1   • alendronate (FOSAMAX) 70 mg tablet Take 1 tablet (70 mg total) by mouth every 7 days Take with full glass water, on empty stomach, do not eat or lay down for 30 min.  (Patient not taking: Reported on 8/21/2023) 12 tablet 3   • bisacodyl (DULCOLAX) 5 mg EC tablet Take as instructed by GI office for bowel prep for colonoscopy 2 tablet 0   • Cholecalciferol 2000 units CAPS Take 1 capsule by mouth • Linzess 145 MCG CAPS Take 145 mcg by mouth in the morning     • naloxegol oxalate (Movantik) 25 MG tablet Take 1 tablet (25 mg total) by mouth in the morning 30 tablet 1   • Nutritional Supplements (ProSource No Carb) LIQD Take 30 mL by mouth daily 887 mL 1   • polyethylene glycol (GLYCOLAX) 17 GM/SCOOP powder Take as directed by the office. 238 g 0   • polyethylene glycol (GOLYTELY) 4000 mL solution Take as instructed by GI office for bowel prep 4000 mL 0   • pregabalin (LYRICA) 50 mg capsule Take 1 capsule (50 mg total) by mouth 2 (two) times a day 180 capsule 1   • Psyllium (VEGETABLE LAXATIVE PO) Take 2 tablets by mouth 3 (three) times a day Top Care Brand       No current facility-administered medications for this visit.        Objective:    /66 (BP Location: Left arm, Patient Position: Sitting, Cuff Size: Standard)   Pulse 73   Temp 100.3 °F (37.9 °C) (Tympanic)   SpO2 94%        Physical Exam      Constitutional:  Well developed, well nourished, no acute distress, non-toxic appearance   Eyes:  PERRL, conjunctiva normal , non icteric sclera  HENT:  Atraumatic, oropharynx moist. Neck-  supple   Respiratory:  CTA b/l, normal breath sounds, no rales, no wheezing   Cardiovascular:  RRR, no murmurs, no LE edema b/l  GI:  Soft, nondistended, normal bowel sounds x 4, nontender, no organomegaly, no mass, no rebound, no guarding   Neurologic:  no focal deficits noted   Psychiatric:  Speech and behavior appropriate , AAO x 3           Reny Danielle, DO

## 2023-09-05 ENCOUNTER — CLINICAL SUPPORT (OUTPATIENT)
Dept: INTERNAL MEDICINE CLINIC | Facility: OTHER | Age: 73
End: 2023-09-05
Payer: MEDICARE

## 2023-09-05 ENCOUNTER — TELEPHONE (OUTPATIENT)
Dept: INTERNAL MEDICINE CLINIC | Facility: OTHER | Age: 73
End: 2023-09-05

## 2023-09-05 DIAGNOSIS — N30.00 ACUTE CYSTITIS WITHOUT HEMATURIA: Primary | ICD-10-CM

## 2023-09-05 DIAGNOSIS — R31.9 URINARY TRACT INFECTION WITH HEMATURIA, SITE UNSPECIFIED: ICD-10-CM

## 2023-09-05 DIAGNOSIS — R82.90 CLOUDY URINE: Primary | ICD-10-CM

## 2023-09-05 DIAGNOSIS — N39.0 URINARY TRACT INFECTION WITH HEMATURIA, SITE UNSPECIFIED: ICD-10-CM

## 2023-09-05 LAB
SL AMB  POCT GLUCOSE, UA: NEGATIVE
SL AMB LEUKOCYTE ESTERASE,UA: ABNORMAL
SL AMB POCT BILIRUBIN,UA: NEGATIVE
SL AMB POCT BLOOD,UA: ABNORMAL
SL AMB POCT CLARITY,UA: ABNORMAL
SL AMB POCT COLOR,UA: YELLOW
SL AMB POCT KETONES,UA: NEGATIVE
SL AMB POCT NITRITE,UA: NEGATIVE
SL AMB POCT PH,UA: 5
SL AMB POCT SPECIFIC GRAVITY,UA: >=1.03
SL AMB POCT URINE PROTEIN: ABNORMAL
SL AMB POCT UROBILINOGEN: ABNORMAL

## 2023-09-05 PROCEDURE — 87077 CULTURE AEROBIC IDENTIFY: CPT | Performed by: INTERNAL MEDICINE

## 2023-09-05 PROCEDURE — 87186 SC STD MICRODIL/AGAR DIL: CPT | Performed by: INTERNAL MEDICINE

## 2023-09-05 PROCEDURE — 87086 URINE CULTURE/COLONY COUNT: CPT | Performed by: INTERNAL MEDICINE

## 2023-09-05 PROCEDURE — 81003 URINALYSIS AUTO W/O SCOPE: CPT

## 2023-09-05 RX ORDER — CEPHALEXIN 500 MG/1
500 CAPSULE ORAL 2 TIMES DAILY
Qty: 14 CAPSULE | Refills: 0 | Status: SHIPPED | OUTPATIENT
Start: 2023-09-05 | End: 2023-09-12

## 2023-09-05 NOTE — TELEPHONE ENCOUNTER
Pt's  dropped off urine, dipped in [facility] office results given to Dr Thania Ybarra.   Sent for culture to [facility]

## 2023-09-05 NOTE — TELEPHONE ENCOUNTER
Pt's  will be dropping off urine sample at [facility] office today 9/5/2023 to be dipped in office for virtual appt tomorrow with Dr Mariel Street.

## 2023-09-05 NOTE — TELEPHONE ENCOUNTER
Spoke to patient. Faxed referral to [facility] on 8/21. Pt has not heard from [facility] yet. Called [facility] and was informed a therapist was assigned and will be contacting pt in a "few days". I asked why it is taking so long, and they did not provide me with any true reason. Rolanda Morgan it was long weekend and said a "therapist was assigned to case and will be contacting patient in a few days".

## 2023-09-06 ENCOUNTER — TELEMEDICINE (OUTPATIENT)
Dept: INTERNAL MEDICINE CLINIC | Facility: OTHER | Age: 73
End: 2023-09-06
Payer: MEDICARE

## 2023-09-06 VITALS — BODY MASS INDEX: 28 KG/M2 | HEIGHT: 71 IN | WEIGHT: 200 LBS

## 2023-09-06 DIAGNOSIS — N30.00 ACUTE CYSTITIS WITHOUT HEMATURIA: Primary | ICD-10-CM

## 2023-09-06 PROCEDURE — 99442 PR PHYS/QHP TELEPHONE EVALUATION 11-20 MIN: CPT | Performed by: INTERNAL MEDICINE

## 2023-09-06 NOTE — PROGRESS NOTES
Virtual Regular Visit    Verification of patient location:    Patient is located at Home in the following state in which I hold an active license [place]      Assessment/Plan:    Problem List Items Addressed This Visit        Genitourinary    Acute cystitis without hematuria - Primary     Will order her on Keflex and follow-up urine culture. Encouraged she increase her oral hydration. Reason for visit is   Chief Complaint   Patient presents with   • Urinary Tract Infection     Possible- patient has cath -urine provided yesterday and started on AB will  today. Urgency and pressure has had this off and on and has been on Ab before   • Virtual Regular Visit        Encounter provider Yesenia Self MD    Provider located at [address] [id number]      Recent Visits  Date Type Provider Dept   09/05/23 Telephone Ace Cricket Franks  [facility]   Showing recent visits within past 7 days and meeting all other requirements  Today's Visits  Date Type Provider Dept   09/06/23 Telemedicine Yesenia Self MD  [facility]   Showing today's visits and meeting all other requirements  Future Appointments  No visits were found meeting these conditions. Showing future appointments within next 150 days and meeting all other requirements       The patient was identified by name and date of birth. Nancy Peralta was informed that this is a telemedicine visit and that the visit is being conducted through Telephone. My office door was closed. No one else was in the room. She acknowledged consent and understanding of privacy and security of the video platform. The patient has agreed to participate and understands they can discontinue the visit at any time.     It was my intent to perform this visit via video technology but the patient was not able to do a video connection so the visit was completed via audio telephone only. Patient is aware this is a billable service. Subjective  Sameera Poll   67-year-old female seen today with concern for recurrent urinary tract infection. She had recurrent urinary tract infections, most recently treated at the end of June 2023. Symptoms have been gradually progressing over the past 1-2 weeks. Urinary Tract Infection   This is a recurrent problem. The current episode started in the past 7 days. The problem occurs every urination. The patient is experiencing no pain. There has been no fever. She is not sexually active. There is no history of pyelonephritis. Associated symptoms include urgency. Pertinent negatives include no chills, hematuria, nausea or vomiting. She has tried nothing for the symptoms. Past Medical History:   Diagnosis Date   • Acute cystitis    • Anxiety    • Arthritis    • Cauda equina syndrome with neurogenic bladder ([address])    • Disease of thyroid gland    • Dysuria    • Multiple sclerosis (HCC)    • Neurogenic bladder    • Nocturia    • Rheumatoid arthritis ([address])    • UTI (urinary tract infection)        Past Surgical History:   Procedure Laterality Date   • BREAST EXCISIONAL BIOPSY Bilateral     greater than 12 years ago   • CYSTOSCOPY  2012   • KNEE SURGERY         Current Outpatient Medications   Medication Sig Dispense Refill   • ALPRAZolam (XANAX) 1 mg tablet Take 1 tablet (1 mg total) by mouth 2 (two) times a day as needed for anxiety 60 tablet 1   • aspirin-dipyridamole (AGGRENOX)  mg per 12 hr capsule Take 1 capsule by mouth every 12 (twelve) hours     • baclofen 10 mg tablet Take 1 tablet (10 mg total) by mouth 4 (four) times a day 360 tablet 1   • bisacodyl (DULCOLAX) 5 mg EC tablet Please follow prep instructions provided by the office.  (Patient taking differently: Take 5 mg by mouth daily as needed for constipation) 2 tablet 0   • buPROPion (WELLBUTRIN SR) 200 MG 12 hr tablet Take 1 tablet by mouth 2 (two) times a day       • carBAMazepine (TEGretol XR) 200 mg 12 hr tablet Take 200 mg by mouth 2 (two) times a day       • ergocalciferol (VITAMIN D2) 50,000 units Take 1 capsule (50,000 Units total) by mouth once a week 12 capsule 1   • levothyroxine 200 mcg tablet TAKE 1 TABLET BY MOUTH  DAILY     • Linzess 290 MCG CAPS Take 290 mcg by mouth in the morning     • methadone (DOLOPHINE) 10 mg tablet Take two tabs by mouth three times daily and three tabs at bedtime. Long term medication,     • methylphenidate (RITALIN) 20 MG tablet Three tabs daily - Long term medication     • morphine (MS CONTIN) 60 mg 12 hr tablet 2 tabs in am; 1 tab mid-morning; 1 tab mid afternoon; 2 tabs at bedtime. Long term medication     • naloxegol oxalate (Movantik) 25 MG tablet Take 1 tablet (25 mg total) by mouth in the morning 30 tablet 1   • pantoprazole (PROTONIX) 40 mg tablet Take 1 tablet (40 mg total) by mouth daily 90 tablet 1   • PARoxetine (PAXIL) 30 mg tablet Take 1 tablet by mouth in the morning     • prochlorperazine (COMPAZINE) 25 mg suppository Insert 25 mg into the rectum daily as needed for nausea or vomiting     • promethazine (PHENERGAN) 25 mg suppository Insert 1 suppository (25 mg total) into the rectum every 6 (six) hours as needed for nausea or vomiting 12 each 5   • traZODone (DESYREL) 150 mg tablet Take 1 tablet (150 mg total) by mouth daily at bedtime 90 tablet 1   • cephalexin (KEFLEX) 500 mg capsule Take 1 capsule (500 mg total) by mouth 2 (two) times a day for 7 days 14 capsule 0     No current facility-administered medications for this visit. Allergies   Allergen Reactions   • Codeine      Other reaction(s): Other (See Comments)  can take morphine  Other reaction(s):  Other (See Comments)  can take morphine  Can take morphine   • Penicillins        Review of Systems   Constitutional: Negative for activity change, appetite change, chills, diaphoresis, fatigue and fever.   HENT: Negative for congestion, postnasal drip, rhinorrhea, sinus pressure, sinus pain, sneezing and sore throat. Eyes: Negative for visual disturbance. Respiratory: Negative for apnea, cough, choking, chest tightness, shortness of breath and wheezing. Cardiovascular: Negative for chest pain, palpitations and leg swelling. Gastrointestinal: Negative for abdominal distention, abdominal pain, anal bleeding, blood in stool, constipation, diarrhea, nausea and vomiting. Endocrine: Negative for cold intolerance and heat intolerance. Genitourinary: Positive for urgency. Negative for difficulty urinating, dysuria and hematuria. Musculoskeletal: Negative. Skin: Negative. Neurological: Negative for dizziness, weakness, light-headedness, numbness and headaches. Hematological: Negative for adenopathy. Psychiatric/Behavioral: Negative for agitation, sleep disturbance and suicidal ideas. All other systems reviewed and are negative. Video Exam    Vitals:    09/06/23 1102   Weight: 90.7 kg (200 lb)   Height: 5' 11" (1.803 m)       Physical Exam  Vitals and nursing note reviewed.           Visit Time  Total Visit Duration: 15

## 2023-09-07 LAB — BACTERIA UR CULT: ABNORMAL

## 2023-09-13 NOTE — TELEPHONE ENCOUNTER
MARYJO for pt to call me at [facility] office    Called pt two more additional times to verify if [facility] eventually came out to home but she did not return call

## 2023-09-22 ENCOUNTER — TELEPHONE (OUTPATIENT)
Dept: INTERNAL MEDICINE CLINIC | Facility: OTHER | Age: 73
End: 2023-09-22

## 2023-09-22 DIAGNOSIS — N39.0 CHRONIC UTI: Primary | ICD-10-CM

## 2023-09-22 LAB
BACTERIA UR QL AUTO: ABNORMAL /HPF
BILIRUB UR QL STRIP: NEGATIVE
CLARITY UR: ABNORMAL
COLOR UR: ABNORMAL
GLUCOSE UR STRIP-MCNC: NEGATIVE MG/DL
HGB UR QL STRIP.AUTO: NEGATIVE
KETONES UR STRIP-MCNC: NEGATIVE MG/DL
LEUKOCYTE ESTERASE UR QL STRIP: ABNORMAL
NITRITE UR QL STRIP: POSITIVE
NON-SQ EPI CELLS URNS QL MICRO: ABNORMAL /HPF
PH UR STRIP.AUTO: 5.5 [PH]
PROT UR STRIP-MCNC: ABNORMAL MG/DL
RBC #/AREA URNS AUTO: ABNORMAL /HPF
SP GR UR STRIP.AUTO: 1.02 (ref 1–1.03)
UROBILINOGEN UR STRIP-ACNC: <2 MG/DL
WBC #/AREA URNS AUTO: ABNORMAL /HPF

## 2023-09-22 NOTE — TELEPHONE ENCOUNTER
Patients  calling because patient is having UTI symptoms again and he wants to drop a sample off at the office because he is unable to get her into the office. Are you able to place an order in her chart?

## 2023-09-25 LAB — BACTERIA UR CULT: ABNORMAL

## 2023-09-28 ENCOUNTER — TELEPHONE (OUTPATIENT)
Dept: INTERNAL MEDICINE CLINIC | Facility: OTHER | Age: 73
End: 2023-09-28

## 2023-09-29 NOTE — TELEPHONE ENCOUNTER
Patient called again today for urine culture results from 9/22/23. She is having UTI symptoms.     Dr Darin Holland is unavailable

## 2023-09-29 NOTE — TELEPHONE ENCOUNTER
Per Dr. Do Lykens patient on Keflex which is susceptible to the bacteria shown on the urine culture. Please see result note.

## 2023-09-29 NOTE — TELEPHONE ENCOUNTER
Dr Welby Saint ordered Keflex on 9/5/23 - pt took for 7 days. She had urine retested on 9/22/23 because she continues to have UTI symptoms. She call today and continues to have UTI symptoms and is requesting treatment.

## 2023-10-02 DIAGNOSIS — N39.0 CHRONIC UTI: Primary | ICD-10-CM

## 2023-10-02 RX ORDER — CIPROFLOXACIN 500 MG/1
500 TABLET, FILM COATED ORAL EVERY 12 HOURS SCHEDULED
Qty: 14 TABLET | Refills: 0 | Status: SHIPPED | OUTPATIENT
Start: 2023-10-02 | End: 2023-10-09

## 2023-10-27 ENCOUNTER — TELEPHONE (OUTPATIENT)
Dept: INTERNAL MEDICINE CLINIC | Age: 73
End: 2023-10-27

## 2023-10-27 ENCOUNTER — CLINICAL SUPPORT (OUTPATIENT)
Dept: INTERNAL MEDICINE CLINIC | Age: 73
End: 2023-10-27

## 2023-10-27 DIAGNOSIS — N30.00 ACUTE CYSTITIS WITHOUT HEMATURIA: Primary | ICD-10-CM

## 2023-10-27 LAB
BACTERIA UR QL AUTO: ABNORMAL /HPF
BILIRUB UR QL STRIP: NEGATIVE
CLARITY UR: CLEAR
COLOR UR: YELLOW
GLUCOSE UR STRIP-MCNC: NEGATIVE MG/DL
HGB UR QL STRIP.AUTO: NEGATIVE
KETONES UR STRIP-MCNC: NEGATIVE MG/DL
LEUKOCYTE ESTERASE UR QL STRIP: ABNORMAL
NITRITE UR QL STRIP: NEGATIVE
NON-SQ EPI CELLS URNS QL MICRO: ABNORMAL /HPF
PH UR STRIP.AUTO: 5.5 [PH]
PROT UR STRIP-MCNC: ABNORMAL MG/DL
RBC #/AREA URNS AUTO: ABNORMAL /HPF
SP GR UR STRIP.AUTO: 1.02 (ref 1–1.03)
UROBILINOGEN UR STRIP-ACNC: <2 MG/DL
WBC #/AREA URNS AUTO: ABNORMAL /HPF

## 2023-10-27 PROCEDURE — 81001 URINALYSIS AUTO W/SCOPE: CPT

## 2023-10-27 NOTE — TELEPHONE ENCOUNTER
Patients significant other dropped off a urine sample because he was told by someone in the [place] office to come drop it off here since there is a doctor here. There are no orders in the system at this time. Patient has recurring UTI's and was referred to urology but they are unable to make an appointment with them at this time. Andrés Sterling states that she currently has a UTI. Would you like to place order for UA? Please advise.

## 2023-10-30 ENCOUNTER — OFFICE VISIT (OUTPATIENT)
Dept: INTERNAL MEDICINE CLINIC | Facility: OTHER | Age: 73
End: 2023-10-30
Payer: MEDICARE

## 2023-10-30 VITALS
HEART RATE: 73 BPM | DIASTOLIC BLOOD PRESSURE: 86 MMHG | SYSTOLIC BLOOD PRESSURE: 124 MMHG | OXYGEN SATURATION: 93 % | TEMPERATURE: 98.5 F | WEIGHT: 200 LBS | BODY MASS INDEX: 28 KG/M2 | HEIGHT: 71 IN

## 2023-10-30 DIAGNOSIS — Z13.0 SCREENING, IRON DEFICIENCY ANEMIA: ICD-10-CM

## 2023-10-30 DIAGNOSIS — G60.3 IDIOPATHIC PROGRESSIVE NEUROPATHY: ICD-10-CM

## 2023-10-30 DIAGNOSIS — R22.1 NECK MASS: ICD-10-CM

## 2023-10-30 DIAGNOSIS — E03.9 ACQUIRED HYPOTHYROIDISM: ICD-10-CM

## 2023-10-30 DIAGNOSIS — N31.9 NEUROGENIC BLADDER: ICD-10-CM

## 2023-10-30 DIAGNOSIS — E55.9 VITAMIN D DEFICIENCY: ICD-10-CM

## 2023-10-30 DIAGNOSIS — Z23 ENCOUNTER FOR IMMUNIZATION: ICD-10-CM

## 2023-10-30 DIAGNOSIS — F41.9 ANXIETY: ICD-10-CM

## 2023-10-30 DIAGNOSIS — R30.0 DYSURIA: ICD-10-CM

## 2023-10-30 DIAGNOSIS — Z99.3 WHEELCHAIR DEPENDENCE: ICD-10-CM

## 2023-10-30 DIAGNOSIS — G62.9 NEUROPATHY: Primary | ICD-10-CM

## 2023-10-30 LAB
SL AMB  POCT GLUCOSE, UA: NEGATIVE
SL AMB LEUKOCYTE ESTERASE,UA: NEGATIVE
SL AMB POCT BILIRUBIN,UA: NEGATIVE
SL AMB POCT BLOOD,UA: NEGATIVE
SL AMB POCT CLARITY,UA: CLEAR
SL AMB POCT COLOR,UA: YELLOW
SL AMB POCT KETONES,UA: NEGATIVE
SL AMB POCT NITRITE,UA: NEGATIVE
SL AMB POCT PH,UA: 5.5
SL AMB POCT SPECIFIC GRAVITY,UA: >=1.03
SL AMB POCT URINE PROTEIN: NEGATIVE
SL AMB POCT UROBILINOGEN: NORMAL

## 2023-10-30 PROCEDURE — 87186 SC STD MICRODIL/AGAR DIL: CPT

## 2023-10-30 PROCEDURE — 90662 IIV NO PRSV INCREASED AG IM: CPT

## 2023-10-30 PROCEDURE — 87077 CULTURE AEROBIC IDENTIFY: CPT

## 2023-10-30 PROCEDURE — 87147 CULTURE TYPE IMMUNOLOGIC: CPT

## 2023-10-30 PROCEDURE — G0008 ADMIN INFLUENZA VIRUS VAC: HCPCS

## 2023-10-30 PROCEDURE — 99214 OFFICE O/P EST MOD 30 MIN: CPT | Performed by: INTERNAL MEDICINE

## 2023-10-30 PROCEDURE — 87086 URINE CULTURE/COLONY COUNT: CPT

## 2023-10-30 PROCEDURE — 81003 URINALYSIS AUTO W/O SCOPE: CPT | Performed by: INTERNAL MEDICINE

## 2023-10-31 ENCOUNTER — HOME CARE VISIT (OUTPATIENT)
Dept: HOME HEALTH SERVICES | Facility: HOME HEALTHCARE | Age: 73
End: 2023-10-31

## 2023-10-31 ENCOUNTER — HOME HEALTH ADMISSION (OUTPATIENT)
Dept: HOME HEALTH SERVICES | Facility: HOME HEALTHCARE | Age: 73
End: 2023-10-31
Payer: MEDICARE

## 2023-10-31 ENCOUNTER — PATIENT OUTREACH (OUTPATIENT)
Dept: INTERNAL MEDICINE CLINIC | Facility: CLINIC | Age: 73
End: 2023-10-31

## 2023-10-31 RX ORDER — ALPRAZOLAM 1 MG/1
1 TABLET ORAL 2 TIMES DAILY PRN
Qty: 60 TABLET | Refills: 1 | Status: SHIPPED | OUTPATIENT
Start: 2023-10-31

## 2023-10-31 NOTE — PROGRESS NOTES
[address], [place]  Clinic Visit Note  Kae Jacome 75 y.o. female   MRN: [id number]    Assessment and Plan    Diagnoses and all orders for this visit:    Neuropathy  -     Check iron, B12, TSH, Vitamin D for ruling out other contributing etiologies   Patient already on multiple medication regimens for pain; caution with additional medications to avoid polypharmacy and multiple drug interactions  Depending on lab results, will treat for deficiencies. Otherwise will recommend follow up with neurology as they plan to see patient Dec 5 this year. Idiopathic progressive neuropathy  -     Vitamin B12; Future  Screening, iron deficiency anemia  -     Iron Panel (Includes Ferritin, Iron Sat%, Iron, and TIBC); Future  Acquired hypothyroidism  -     TSH, 3rd generation with Free T4 reflex; Future  Vitamin D deficiency  -     Vitamin D 25 hydroxy; Future    Encounter for immunization  -    Given influenza vaccine, high-dose, PF 0.7 mL (FLUZONE HIGH-DOSE)    Neck mass  - Posterior upper aspect of SCM. Present for months, but tender. About 3-4 cm in size. Possible muscle spasm vs. suspicious mass as it has persisted beyond an acute infectious phase; r/o malignancy w/U/S       US head neck soft tissue; Future    Wheelchair dependence  -     Ambulatory Referral to Social Work Care Management Program; Future  Referral to case management for Windy Erps can -  who accompanies patient today is her  and he admits to very poor vision. He does not feel comfortable driving patient beyond the 4 miles it takes to travel from home to our office.   -     Referral to [facility]; Future --> for PT OT    Neurogenic bladder  -     Ambulatory Referral to Urology; Future    Dysuria  - UA 3 days prior to presentation negative for leukocytes, leuk esterase, nitrates  - POCT UA today also negative for above. Do not supect UTI. No role for abx.    Suspect the pain and frequency is due to straight-catheterization 4-5x/day, rather than the more commonly recommended 3x/day. Advised pt can take cranberry extract for UTI ppx but not for treatment if she wishes to engage in preventative behaviors that reduce/avoid direct trauma to the area. Follow up in our clinic in Jan 2nd 2024 for 5 month follow up w/Dr. Dennys Mullins. Subjective   CHIEF COMPLAINT:   Chief Complaint   Patient presents with   • Foot Injury     Bilateral foot pain, legs, lower back for 3 months. • Urinary Tract Infection      HISTORY OF PRESENT ILLNESS:  Nesha Parrish is a 75 y.o. yo female with pmhx of multiple sclerosis, chronic pain, drug-induced chronic constipation, neurogenic bladder, hypothyroidism, depression, wheelchair dependence, vitamin D deficiency, presenting to clinic today for evaluation of BLE pain x 3 months. Pain is most present on plantar surface of the feet during MS flares (relapsing-remitting subtype), but this 3 month pain has also involved all of the skin territories below both knees. Pain is more severe in L>R lower extremity. Symptoms first started 3 months ago without a known trigger (no travel, illness, acute stress episodes). Severity is rated as severe (7-8/10). Timing is constant in LLE for past 3 months. Overall it is the same. Symptoms start at the feet and move upward and terminate at the knee. The upper extremities are unaffected. The patient denies any sick contacts. Denies any recent travel. Patient has tried heat/cold compresses without relief, prescribed MS contin without relief, and remains on carbamazepine, methadone, baclofen, xanax qHS, aspirin-dipyridamole per neurology and pain management. Patient has seen both specialists and has felt that their scripts have not provided adequate relief. Pt has also had multiple EL w/pain management without improvement.    Patient denies fevers/chills, chest pain, shortness of breath, heart palpitations, nausea, vomiting, abdominal pain. Patient also c/o dysuria and straight caths for neurogenic bladder 4-5x/day. She does not see urology for this, but is managed by neuro thus far. Objective     Vitals:    10/30/23 1440   BP: 124/86   BP Location: Left arm   Patient Position: Sitting   Cuff Size: Standard   Pulse: 73   Temp: 98.5 °F (36.9 °C)   TempSrc: Temporal   SpO2: 93%   Weight: 90.7 kg (200 lb)   Height: 5' 11" (1.803 m)     Physical Exam  Vitals reviewed. Constitutional:       General: She is not in acute distress. Appearance: She is not ill-appearing. Comments: Resting comfortably in wheelchair   Eyes:      General: No scleral icterus. Pupils: Pupils are equal, round, and reactive to light. Comments: L eye exotropia   Neck:      Thyroid: No thyroid mass, thyromegaly or thyroid tenderness. Trachea: Trachea normal.        Comments: No thyroid nodules appreciated on exam  Cardiovascular:      Rate and Rhythm: Normal rate and regular rhythm. Pulmonary:      Effort: Pulmonary effort is normal. No respiratory distress. Breath sounds: No wheezing or rales. Comments: thoracic kyphosis noted on exam  Abdominal:      General: There is no distension. Palpations: Abdomen is soft. Tenderness: There is no abdominal tenderness. There is no guarding. Musculoskeletal:      Right lower leg: No edema. Left lower leg: No edema. Comments: Passive ROM in BLE mildly limited by increased tonicity; left knee 1+/5, right knee 2/5 on modified mary scale. No painful termination. FROM in both knees w/extension and flexion. Mild pain with hip joint inversion b/l    Lymphadenopathy:      Head:      Right side of head: No submental or submandibular adenopathy. Left side of head: No submental or submandibular adenopathy. Skin:     General: Skin is warm and dry. Capillary Refill: Capillary refill takes less than 2 seconds. Coloration: Skin is pale. Findings: No rash. Neurological:      General: No focal deficit present. Mental Status: She is alert and oriented to person, place, and time. Comments: Pain to palpation over anterior tibia b/l, over gastrocnemius b/l, and dorsum of both feet. No medial vs. lateral or dermatomal preference. No decrease in sensation/paresthesia or anaesthesia    Psychiatric:         Mood and Affect: Mood normal.         Behavior: Behavior normal.       History     Current Outpatient Medications:   •  ALPRAZolam (XANAX) 1 mg tablet, Take 1 tablet (1 mg total) by mouth 2 (two) times a day as needed for anxiety, Disp: 60 tablet, Rfl: 1  •  aspirin-dipyridamole (AGGRENOX)  mg per 12 hr capsule, Take 1 capsule by mouth every 12 (twelve) hours, Disp: , Rfl:   •  baclofen 10 mg tablet, Take 1 tablet (10 mg total) by mouth 4 (four) times a day, Disp: 360 tablet, Rfl: 1  •  bisacodyl (DULCOLAX) 5 mg EC tablet, Please follow prep instructions provided by the office. (Patient taking differently: Take 5 mg by mouth daily as needed for constipation), Disp: 2 tablet, Rfl: 0  •  buPROPion (WELLBUTRIN SR) 200 MG 12 hr tablet, Take 1 tablet by mouth 2 (two) times a day  , Disp: , Rfl:   •  carBAMazepine (TEGretol XR) 200 mg 12 hr tablet, Take 200 mg by mouth 2 (two) times a day  , Disp: , Rfl:   •  ergocalciferol (VITAMIN D2) 50,000 units, Take 1 capsule (50,000 Units total) by mouth once a week, Disp: 12 capsule, Rfl: 1  •  levothyroxine 200 mcg tablet, TAKE 1 TABLET BY MOUTH  DAILY, Disp: , Rfl:   •  Linzess 290 MCG CAPS, Take 290 mcg by mouth in the morning, Disp: , Rfl:   •  methadone (DOLOPHINE) 10 mg tablet, Take two tabs by mouth three times daily and three tabs at bedtime. Long term medication,, Disp: , Rfl:   •  methylphenidate (RITALIN) 20 MG tablet, Three tabs daily - Long term medication, Disp: , Rfl:   •  morphine (MS CONTIN) 60 mg 12 hr tablet, 2 tabs in am; 1 tab mid-morning; 1 tab mid afternoon; 2 tabs at bedtime. Long term medication, Disp: , Rfl:   •  pantoprazole (PROTONIX) 40 mg tablet, Take 1 tablet (40 mg total) by mouth daily, Disp: 90 tablet, Rfl: 1  •  PARoxetine (PAXIL) 30 mg tablet, Take 1 tablet by mouth in the morning, Disp: , Rfl:   •  prochlorperazine (COMPAZINE) 25 mg suppository, Insert 25 mg into the rectum daily as needed for nausea or vomiting, Disp: , Rfl:   •  promethazine (PHENERGAN) 25 mg suppository, Insert 1 suppository (25 mg total) into the rectum every 6 (six) hours as needed for nausea or vomiting, Disp: 12 each, Rfl: 5  •  traZODone (DESYREL) 150 mg tablet, Take 1 tablet (150 mg total) by mouth daily at bedtime, Disp: 90 tablet, Rfl: 1  Past Medical History:   Diagnosis Date   • Acute cystitis    • Anxiety    • Arthritis    • Cauda equina syndrome with neurogenic bladder (HCC)    • Disease of thyroid gland    • Dysuria    • Multiple sclerosis (HCC)    • Neurogenic bladder    • Nocturia    • Rheumatoid arthritis (HCC)    • UTI (urinary tract infection)      Past Surgical History:   Procedure Laterality Date   • BREAST EXCISIONAL BIOPSY Bilateral     greater than 12 years ago   • CYSTOSCOPY     • KNEE SURGERY       Social History     Socioeconomic History   • Marital status: /Civil Union     Spouse name: Not on file   • Number of children: Not on file   • Years of education: Not on file   • Highest education level: Not on file   Occupational History   • Not on file   Tobacco Use   • Smoking status: Former     Packs/day: 0.25     Years: 3.00     Total pack years: 0.75     Types: Cigarettes     Start date: 0     Quit date:      Years since quittin.8   • Smokeless tobacco: Never   Vaping Use   • Vaping Use: Some days   • Substances: THC   Substance and Sexual Activity   • Alcohol use: No   • Drug use: Yes     Types: Marijuana     Comment: Vape pen for pain - rarely - medical marijuana   • Sexual activity: Not on file   Other Topics Concern   • Not on file   Social History Narrative   • Not on file     Social Determinants of Health     Financial Resource Strain: Not on file   Food Insecurity: Not on file   Transportation Needs: Not on file   Physical Activity: Not on file   Stress: Not on file   Social Connections: Not on file   Intimate Partner Violence: Not on file   Housing Stability: Not on file     Family History   Adopted: Yes   Problem Relation Age of Onset   • No Known Problems Mother    • No Known Problems Father          Ammy Velez MD  [facility] Internal Medicine PGY-3  [facility]  [address] [address]         PLEASE NOTE:  This encounter was completed utilizing the [employer] Direct Speech Voice Recognition Software. Grammatical errors, random word insertions, pronoun errors and incomplete sentences are occasional consequences of the system due to software limitations, ambient noise and hardware issues. These may be missed by proof reading prior to affixing electronic signature. Any questions or concerns about the content, text or information contained within the body of this dictation should be directly addressed to the physician for clarification. Please do not hesitate to call me directly if you have any any questions or concerns.

## 2023-10-31 NOTE — CASE COMMUNICATION
PC made to attempt to schedule home PT [facility] visit for today and voicemail message left. Pt did not return my phone call to schedule.     Thank you,  Yayo FUENTEST

## 2023-11-01 ENCOUNTER — HOME CARE VISIT (OUTPATIENT)
Dept: HOME HEALTH SERVICES | Facility: HOME HEALTHCARE | Age: 73
End: 2023-11-01

## 2023-11-01 ENCOUNTER — PATIENT OUTREACH (OUTPATIENT)
Dept: INTERNAL MEDICINE CLINIC | Facility: OTHER | Age: 73
End: 2023-11-01

## 2023-11-01 LAB — BACTERIA UR CULT: ABNORMAL

## 2023-11-02 ENCOUNTER — PATIENT OUTREACH (OUTPATIENT)
Dept: INTERNAL MEDICINE CLINIC | Facility: CLINIC | Age: 73
End: 2023-11-02

## 2023-11-02 NOTE — LETTER
November 2, 2023     Estrellita Saldaña  [address]  [address]    Patient: Estrellita Saldaña   YOB: 1950           Dear  Clarenceivis Nailamargaret:    I am the  that covers [place]. I called several times to see if you need assistance. Please feel free to contact me at [phone].      Sincerely,        BERNABE Voss        CC: No Recipients

## 2023-11-05 PROBLEM — N30.00 ACUTE CYSTITIS WITHOUT HEMATURIA: Status: RESOLVED | Noted: 2020-09-22 | Resolved: 2023-11-05

## 2023-11-07 ENCOUNTER — HOME CARE VISIT (OUTPATIENT)
Dept: HOME HEALTH SERVICES | Facility: HOME HEALTHCARE | Age: 73
End: 2023-11-07

## 2023-11-07 ENCOUNTER — TELEPHONE (OUTPATIENT)
Dept: INTERNAL MEDICINE CLINIC | Facility: OTHER | Age: 73
End: 2023-11-07

## 2023-11-07 NOTE — CASE COMMUNICATION
Received call from dtr [address] who [place]. Her concern is pt and her S.O. are forgetful and she would like to be contacted for now regarding vs/times. She is requesting a MSW referral. Please call her after your initial vs so she can give you a better idea of what is happening in home. Her number is Tori .

## 2023-11-07 NOTE — Clinical Note
Received call from dtr [address] who lives [place] state. Her concern is pt and her S.O. are forgetful and she would like to be contacted for now regarding vs/times. She is requesting a MSW referral.  Please call her after your initial vs so she can give you a better idea of what is happening in home.   Her number is

## 2023-11-08 ENCOUNTER — HOME CARE VISIT (OUTPATIENT)
Dept: HOME HEALTH SERVICES | Facility: HOME HEALTHCARE | Age: 73
End: 2023-11-08
Payer: MEDICARE

## 2023-11-08 VITALS — SYSTOLIC BLOOD PRESSURE: 126 MMHG | HEART RATE: 74 BPM | DIASTOLIC BLOOD PRESSURE: 82 MMHG | OXYGEN SATURATION: 98 %

## 2023-11-08 PROCEDURE — 10330081 VN NO-PAY CLAIM PROCEDURE

## 2023-11-08 PROCEDURE — G0151 HHCP-SERV OF PT,EA 15 MIN: HCPCS

## 2023-11-08 PROCEDURE — 400013 VN SOC

## 2023-11-10 DIAGNOSIS — E55.9 VITAMIN D DEFICIENCY: ICD-10-CM

## 2023-11-10 RX ORDER — ERGOCALCIFEROL 1.25 MG/1
50000 CAPSULE ORAL WEEKLY
Qty: 12 CAPSULE | Refills: 1 | Status: SHIPPED | OUTPATIENT
Start: 2023-11-10

## 2023-11-13 ENCOUNTER — HOME CARE VISIT (OUTPATIENT)
Dept: HOME HEALTH SERVICES | Facility: HOME HEALTHCARE | Age: 73
End: 2023-11-13
Payer: MEDICARE

## 2023-11-13 PROCEDURE — G0151 HHCP-SERV OF PT,EA 15 MIN: HCPCS

## 2023-11-14 VITALS — HEART RATE: 70 BPM | OXYGEN SATURATION: 98 %

## 2023-11-16 ENCOUNTER — HOME CARE VISIT (OUTPATIENT)
Dept: HOME HEALTH SERVICES | Facility: HOME HEALTHCARE | Age: 73
End: 2023-11-16
Payer: MEDICARE

## 2023-11-17 PROCEDURE — G0180 MD CERTIFICATION HHA PATIENT: HCPCS | Performed by: INTERNAL MEDICINE

## 2023-11-20 ENCOUNTER — HOME CARE VISIT (OUTPATIENT)
Dept: HOME HEALTH SERVICES | Facility: HOME HEALTHCARE | Age: 73
End: 2023-11-20
Payer: MEDICARE

## 2023-11-21 NOTE — CASE COMMUNICATION
T.C.  to  confirm  scheduled  visit. [facility]  SO  declining  visit  for  today.   Will  attempt  to  visit  on  11/22

## 2023-11-22 ENCOUNTER — HOME CARE VISIT (OUTPATIENT)
Dept: HOME HEALTH SERVICES | Facility: HOME HEALTHCARE | Age: 73
End: 2023-11-22
Payer: MEDICARE

## 2023-11-22 PROCEDURE — G0151 HHCP-SERV OF PT,EA 15 MIN: HCPCS

## 2023-11-24 VITALS — HEART RATE: 72 BPM | OXYGEN SATURATION: 97 %

## 2023-11-27 ENCOUNTER — HOME CARE VISIT (OUTPATIENT)
Dept: HOME HEALTH SERVICES | Facility: HOME HEALTHCARE | Age: 73
End: 2023-11-27
Payer: MEDICARE

## 2023-11-27 NOTE — CASE COMMUNICATION
T.C.  received  from  [facility]  SO  requesting  visit  for  today  be  canceled.   SO  reports  patient  had  a  stressful  weekend  and  is  not  up  for  a  visit  today

## 2023-11-29 ENCOUNTER — HOME CARE VISIT (OUTPATIENT)
Dept: HOME HEALTH SERVICES | Facility: HOME HEALTHCARE | Age: 73
End: 2023-11-29
Payer: MEDICARE

## 2023-11-29 NOTE — CASE COMMUNICATION
Patient  declined  visit  for  today  and  requested  visit  tomorow.   Visit  scheduled  for tomorrow  2pm

## 2023-11-30 ENCOUNTER — HOME CARE VISIT (OUTPATIENT)
Dept: HOME HEALTH SERVICES | Facility: HOME HEALTHCARE | Age: 73
End: 2023-11-30
Payer: MEDICARE

## 2023-11-30 VITALS — OXYGEN SATURATION: 98 % | HEART RATE: 72 BPM

## 2023-11-30 PROCEDURE — G0151 HHCP-SERV OF PT,EA 15 MIN: HCPCS

## 2023-12-01 ENCOUNTER — TELEPHONE (OUTPATIENT)
Dept: INTERNAL MEDICINE CLINIC | Age: 73
End: 2023-12-01

## 2023-12-01 DIAGNOSIS — Z99.3 WHEELCHAIR DEPENDENCE: ICD-10-CM

## 2023-12-01 DIAGNOSIS — G35 MULTIPLE SCLEROSIS (HCC): Primary | ICD-10-CM

## 2023-12-01 DIAGNOSIS — G89.4 CHRONIC PAIN SYNDROME: ICD-10-CM

## 2023-12-01 NOTE — CASE COMMUNICATION
Patient  non  compliant  with  both  treatment  protocol and  treatment  frequency  as  agreeed  upon  at  [facility].   Patient  discharged  from  [facility]  services  as  of  today  and  will  make  referral  to  [facility]  for  treatment  under  Medicare  B

## 2023-12-01 NOTE — TELEPHONE ENCOUNTER
Patient just finished with Ktaiana WOOD and they are requesting that patient continue PHYSICAL THERAPY and OCCUPATIONAL THERAPY with [facility] (OUTPATIENT CAMPUS).      If you would kindly place the orders I will then fax them to:    [phone] you are not in the office until Monday

## 2023-12-04 NOTE — TELEPHONE ENCOUNTER
Referral for OT and PT placed for [facility] (OUTPATIENT CAMPUS) for pt. Printed at [place] [facility].

## 2024-01-02 ENCOUNTER — OFFICE VISIT (OUTPATIENT)
Dept: INTERNAL MEDICINE CLINIC | Age: 74
End: 2024-01-02
Payer: MEDICARE

## 2024-01-02 VITALS
TEMPERATURE: 97.5 F | OXYGEN SATURATION: 91 % | HEART RATE: 112 BPM | SYSTOLIC BLOOD PRESSURE: 100 MMHG | DIASTOLIC BLOOD PRESSURE: 72 MMHG

## 2024-01-02 DIAGNOSIS — E03.9 ACQUIRED HYPOTHYROIDISM: ICD-10-CM

## 2024-01-02 DIAGNOSIS — Z02.82 ADOPTED: ICD-10-CM

## 2024-01-02 DIAGNOSIS — Z12.31 ENCOUNTER FOR SCREENING MAMMOGRAM FOR MALIGNANT NEOPLASM OF BREAST: ICD-10-CM

## 2024-01-02 DIAGNOSIS — G35 MULTIPLE SCLEROSIS (HCC): Primary | ICD-10-CM

## 2024-01-02 DIAGNOSIS — K59.03 DRUG-INDUCED CONSTIPATION: ICD-10-CM

## 2024-01-02 DIAGNOSIS — K21.9 GASTROESOPHAGEAL REFLUX DISEASE WITHOUT ESOPHAGITIS: ICD-10-CM

## 2024-01-02 DIAGNOSIS — G89.4 CHRONIC PAIN SYNDROME: ICD-10-CM

## 2024-01-02 DIAGNOSIS — N39.0 CHRONIC UTI: ICD-10-CM

## 2024-01-02 DIAGNOSIS — F11.20 CONTINUOUS OPIOID DEPENDENCE (HCC): ICD-10-CM

## 2024-01-02 DIAGNOSIS — F33.9 DEPRESSION, RECURRENT (HCC): ICD-10-CM

## 2024-01-02 DIAGNOSIS — N31.9 NEUROGENIC BLADDER: ICD-10-CM

## 2024-01-02 DIAGNOSIS — E55.9 VITAMIN D DEFICIENCY: ICD-10-CM

## 2024-01-02 DIAGNOSIS — Z99.3 WHEELCHAIR DEPENDENCE: ICD-10-CM

## 2024-01-02 DIAGNOSIS — Z78.9 SELF-CATHETERIZES URINARY BLADDER: ICD-10-CM

## 2024-01-02 PROCEDURE — 99214 OFFICE O/P EST MOD 30 MIN: CPT | Performed by: FAMILY MEDICINE

## 2024-01-02 RX ORDER — PANTOPRAZOLE SODIUM 40 MG/1
40 TABLET, DELAYED RELEASE ORAL DAILY
Qty: 90 TABLET | Refills: 1 | Status: SHIPPED | OUTPATIENT
Start: 2024-01-02

## 2024-01-02 RX ORDER — BACLOFEN 10 MG/1
10 TABLET ORAL 4 TIMES DAILY
Qty: 360 TABLET | Refills: 1 | Status: SHIPPED | OUTPATIENT
Start: 2024-01-02

## 2024-01-02 RX ORDER — DULOXETIN HYDROCHLORIDE 30 MG/1
30 CAPSULE, DELAYED RELEASE ORAL DAILY
Qty: 30 CAPSULE | Refills: 5 | Status: SHIPPED | OUTPATIENT
Start: 2024-01-02

## 2024-01-02 NOTE — PROGRESS NOTES
Assessment/Plan:    1. Multiple sclerosis (HCC)  -     Ambulatory referral to Spine & Pain Management; Future  -     baclofen 10 mg tablet; Take 1 tablet (10 mg total) by mouth 4 (four) times a day    2. Encounter for screening mammogram for malignant neoplasm of breast  -     Mammo screening bilateral w 3d & cad; Future; Expected date: 01/02/2024    3. Continuous opioid dependence (HCC)    4. Depression, recurrent (HCC)    5. Acquired hypothyroidism    6. Chronic UTI    7. Drug-induced constipation    8. Vitamin D deficiency    9. Wheelchair dependence    10. Adopted    11. Chronic pain syndrome  -     Ambulatory referral to Spine & Pain Management; Future  -     DULoxetine (CYMBALTA) 30 mg delayed release capsule; Take 1 capsule (30 mg total) by mouth daily    12. Self-catheterizes urinary bladder    13. Neurogenic bladder    14. Gastroesophageal reflux disease without esophagitis  -     pantoprazole (PROTONIX) 40 mg tablet; Take 1 tablet (40 mg total) by mouth daily    Advised patient that we can set up home lab draws over her next visit in February as she must have her lab work drawn from October 2023 and August 2023.        There are no Patient Instructions on file for this visit.    Return for Next scheduled follow up.    Subjective:      Patient ID: [place] Bunn is a 73 y.o. female.    Chief Complaint   Patient presents with    Follow-up     Patient complains of chronic pain in legs & feet, no recent labs    Depression     PHQ 16 - Moderately Severe Depression. Per Peng, caregiver, patient's symptoms are due to her MS        HPI  Insomnia, patient has been having trouble sleeping for several months due to stress at home and was taking her 's trazodone 150 mg tablet since he did not need it.  It did help her sleep.  At last visit I prescribed 50 mg for her since this is a new medication for her technically and she states now it has not been helping her sleep.  She would like an increase in her  dose  February 2022, taking 100 mg of trazodone and still napping in the daytime but does sleep a few hours at night.  Once and increased to 150 mg  January 2024, doing well with trazodone, no recent issues.     Vitamin-D deficiency.  Denies high fatty starchy diet level checked recently is at 19.  She is not taking any over-the-counter medications.  Two thousand nineteen, did not having lab work done July 2021, stop taking vitamin D and has not had any lab work done since fall 2020 February 2022, according to her she is not taking any vitamin-D supplementation.  Last levels were lower checked 1 year ago.  No updated accurate blood work  January 2024, taking vitamin D, no blood work since almost 2 years now     Hypercholesterolemia, unknown family history from mom and dad since she is adopted however her biological sister has high cholesterol is being treated for.  She is not sure where her level has been in the past.  LDL is elevated as well as triglycerides  July 2021, due for labs   February 2022, no updated blood work  January 2024, no recent blood work     Opiate induced constipation, much better and requiring less opioids.  Started medical marijuana and doing well  February 2022, uses Dulcolax 8 tablets to have a successful bowel movement.  Uses magnesium citrate as a last resort.  Has never tried Senokot  January 2024, uses supplementation to help with bowel movements     Neuropathy, started medical marijuana and neuropathy symptoms have significantly improved.  Requiring less pain medication.  Did well with increased dose of Lyrica to twice a day and would like to continue with this dose.  January 2024, looking for pain management doctor that is close to her home as her  has issues traveling and driving.  Thinks her neuropathy may be getting worse, never tried Cymbalta      The following portions of the patient's history were reviewed and updated as appropriate: allergies, current medications, past  family history, past medical history, past social history, past surgical history and problem list.    Review of Systems    Constitutional:  Denies fever or chills   Eyes:  Denies double , blurry vision or eye pain  HENT:  Denies nasal congestion, sore throat or new hearing issues  Respiratory:  Denies cough or shortness of breath or wheezing  Cardiovascular:  Denies palpitations or chest pain  GI:  Denies abdominal pain, nausea, or vomiting, no loose stools, no reflux  Integument:  Denies rash , no open areas  Neurologic:  Denies headache or focal weakness, no dizziness  : no dysuria, or hematuria    Current Outpatient Medications   Medication Sig Dispense Refill    ALPRAZolam (XANAX) 1 mg tablet Take 1 tablet (1 mg total) by mouth 2 (two) times a day as needed for anxiety 60 tablet 1    aspirin-dipyridamole (AGGRENOX)  mg per 12 hr capsule Take 1 capsule by mouth every 12 (twelve) hours      baclofen 10 mg tablet Take 1 tablet (10 mg total) by mouth 4 (four) times a day 360 tablet 1    bisacodyl (DULCOLAX) 5 mg EC tablet Please follow prep instructions provided by the office. (Patient taking differently: Take 5 mg by mouth daily as needed for constipation) 2 tablet 0    buPROPion (WELLBUTRIN SR) 200 MG 12 hr tablet Take 1 tablet by mouth 2 (two) times a day        carBAMazepine (TEGretol XR) 200 mg 12 hr tablet Take 200 mg by mouth 2 (two) times a day        DULoxetine (CYMBALTA) 30 mg delayed release capsule Take 1 capsule (30 mg total) by mouth daily 30 capsule 5    ergocalciferol (VITAMIN D2) 50,000 units Take 1 capsule (50,000 Units total) by mouth once a week 12 capsule 1    levothyroxine 200 mcg tablet TAKE 1 TABLET BY MOUTH  DAILY      Linzess 290 MCG CAPS Take 290 mcg by mouth in the morning      methadone (DOLOPHINE) 10 mg tablet Take two tabs by mouth three times daily and three tabs at bedtime.  Long term medication,      methylphenidate (RITALIN) 20 MG tablet Three tabs daily - Long term  medication      morphine (MS CONTIN) 60 mg 12 hr tablet 2 tabs in am; 1 tab mid-morning; 1 tab mid afternoon; 2 tabs at bedtime.  Long term medication      pantoprazole (PROTONIX) 40 mg tablet Take 1 tablet (40 mg total) by mouth daily 90 tablet 1    PARoxetine (PAXIL) 30 mg tablet Take 1 tablet by mouth in the morning      prochlorperazine (COMPAZINE) 25 mg suppository Insert 25 mg into the rectum daily as needed for nausea or vomiting      traZODone (DESYREL) 150 mg tablet Take 1 tablet (150 mg total) by mouth daily at bedtime 90 tablet 1     No current facility-administered medications for this visit.       Objective:    /72 (BP Location: Left arm, Cuff Size: Adult)   Pulse (!) 112   Temp 97.5 °F (36.4 °C) (Temporal)   SpO2 91%        Physical Exam       Constitutional:  Well developed, well nourished, no acute distress, non-toxic appearance   Eyes:  PERRL, conjunctiva normal , non icteric sclera  HENT:  Atraumatic, oropharynx moist. Neck-  supple   Respiratory:  CTA b/l, normal breath sounds, no rales, no wheezing   Cardiovascular:  RRR, no murmurs, no LE edema b/l  GI:  Soft, nondistended, normal bowel sounds x 4, nontender, no organomegaly, no mass, no rebound, no guarding   Neurologic:  no focal deficits noted   Psychiatric:  Speech and behavior appropriate , AAO x 3  Musculoskeletal, in wheelchair, hypertonic sternocleidomastoid muscle with tenderness to palpation.  Slightly decreased range of motion in rotation.  Patient not able to walk but can pivot and transfer in and out of the car.    Jemma Goldsmith DO

## 2024-01-05 ENCOUNTER — TELEPHONE (OUTPATIENT)
Dept: LAB | Facility: HOSPITAL | Age: 74
End: 2024-01-05

## 2024-01-05 ENCOUNTER — TELEPHONE (OUTPATIENT)
Dept: INTERNAL MEDICINE CLINIC | Facility: OTHER | Age: 74
End: 2024-01-05

## 2024-01-05 NOTE — TELEPHONE ENCOUNTER
Called and spoke to Candice.  She will have scheduling department call pt and set up mobile lab draw.  Stressed importance of drawing both sets of labs ordered on both 10/30/2023 and 8/21/2023

## 2024-01-05 NOTE — TELEPHONE ENCOUNTER
----- Message from Jemma Goldsmith DO sent at 1/2/2024  2:51 PM EST -----  Please schedule home lab draw for blood work ordered in August and in October.  Thank you

## 2024-01-12 NOTE — TELEPHONE ENCOUNTER
Spoke to  and requests you call in afternoon.  He has been having trouble with phone.  Afternoon in best to reach him.

## 2024-01-12 NOTE — TELEPHONE ENCOUNTER
Called and spoke to Candice again.  I see appt not scheduled.  Candice explained pt has to return their call and schedule appt.  They do not hold spots.  Candice will send another message to have scheduling reach out to patient.      Sabrina asked me to reach out to patient and have her be available and answer phone when the schedulers call.   Called and spouse spoke to him and he said afternoon is best to call.  Informed Candice

## 2024-01-15 ENCOUNTER — TELEPHONE (OUTPATIENT)
Dept: LAB | Facility: HOSPITAL | Age: 74
End: 2024-01-15

## 2024-01-15 NOTE — TELEPHONE ENCOUNTER
See Dr Goldsmith's message.  Pt is not have have any refills until labs are done  in process of scheduling with home draw.

## 2024-01-15 NOTE — TELEPHONE ENCOUNTER
Spoke to [place] - she wants us to speak to her  who was not home at the time of call - they will call back

## 2024-01-25 ENCOUNTER — APPOINTMENT (OUTPATIENT)
Dept: LAB | Facility: HOSPITAL | Age: 74
End: 2024-01-25
Attending: FAMILY MEDICINE
Payer: MEDICARE

## 2024-01-25 DIAGNOSIS — E55.9 VITAMIN D DEFICIENCY: ICD-10-CM

## 2024-01-25 DIAGNOSIS — G60.3 IDIOPATHIC PROGRESSIVE NEUROPATHY: ICD-10-CM

## 2024-01-25 DIAGNOSIS — Z00.00 MEDICARE ANNUAL WELLNESS VISIT, SUBSEQUENT: ICD-10-CM

## 2024-01-25 DIAGNOSIS — G62.9 NEUROPATHY: ICD-10-CM

## 2024-01-25 DIAGNOSIS — Z13.0 SCREENING, IRON DEFICIENCY ANEMIA: ICD-10-CM

## 2024-01-25 DIAGNOSIS — G35 MULTIPLE SCLEROSIS (HCC): ICD-10-CM

## 2024-01-25 DIAGNOSIS — Z13.220 SCREENING CHOLESTEROL LEVEL: ICD-10-CM

## 2024-01-25 DIAGNOSIS — E03.9 ACQUIRED HYPOTHYROIDISM: Primary | ICD-10-CM

## 2024-01-25 DIAGNOSIS — E03.9 ACQUIRED HYPOTHYROIDISM: ICD-10-CM

## 2024-01-25 LAB
25(OH)D3 SERPL-MCNC: 72.6 NG/ML (ref 30–100)
ALBUMIN SERPL BCP-MCNC: 3.8 G/DL (ref 3.5–5)
ALP SERPL-CCNC: 59 U/L (ref 34–104)
ALT SERPL W P-5'-P-CCNC: 7 U/L (ref 7–52)
ANION GAP SERPL CALCULATED.3IONS-SCNC: 10 MMOL/L
AST SERPL W P-5'-P-CCNC: 12 U/L (ref 13–39)
BASOPHILS # BLD AUTO: 0.04 THOUSANDS/ÂΜL (ref 0–0.1)
BASOPHILS NFR BLD AUTO: 1 % (ref 0–1)
BILIRUB SERPL-MCNC: 0.31 MG/DL (ref 0.2–1)
BUN SERPL-MCNC: 19 MG/DL (ref 5–25)
CALCIUM SERPL-MCNC: 9.5 MG/DL (ref 8.4–10.2)
CHLORIDE SERPL-SCNC: 104 MMOL/L (ref 96–108)
CHOLEST SERPL-MCNC: 233 MG/DL
CO2 SERPL-SCNC: 28 MMOL/L (ref 21–32)
CREAT SERPL-MCNC: 1.25 MG/DL (ref 0.6–1.3)
EOSINOPHIL # BLD AUTO: 0.1 THOUSAND/ÂΜL (ref 0–0.61)
EOSINOPHIL NFR BLD AUTO: 2 % (ref 0–6)
ERYTHROCYTE [DISTWIDTH] IN BLOOD BY AUTOMATED COUNT: 13.1 % (ref 11.6–15.1)
FERRITIN SERPL-MCNC: 97 NG/ML (ref 11–307)
GFR SERPL CREATININE-BSD FRML MDRD: 42 ML/MIN/1.73SQ M
GLUCOSE SERPL-MCNC: 77 MG/DL (ref 65–140)
HCT VFR BLD AUTO: 36.8 % (ref 34.8–46.1)
HDLC SERPL-MCNC: 57 MG/DL
HGB BLD-MCNC: 11.5 G/DL (ref 11.5–15.4)
IMM GRANULOCYTES # BLD AUTO: 0.03 THOUSAND/UL (ref 0–0.2)
IMM GRANULOCYTES NFR BLD AUTO: 1 % (ref 0–2)
IRON SATN MFR SERPL: 31 % (ref 15–50)
IRON SERPL-MCNC: 62 UG/DL (ref 50–212)
LDLC SERPL CALC-MCNC: 154 MG/DL (ref 0–100)
LYMPHOCYTES # BLD AUTO: 2.19 THOUSANDS/ÂΜL (ref 0.6–4.47)
LYMPHOCYTES NFR BLD AUTO: 34 % (ref 14–44)
MCH RBC QN AUTO: 32.9 PG (ref 26.8–34.3)
MCHC RBC AUTO-ENTMCNC: 31.3 G/DL (ref 31.4–37.4)
MCV RBC AUTO: 105 FL (ref 82–98)
MONOCYTES # BLD AUTO: 0.38 THOUSAND/ÂΜL (ref 0.17–1.22)
MONOCYTES NFR BLD AUTO: 6 % (ref 4–12)
NEUTROPHILS # BLD AUTO: 3.76 THOUSANDS/ÂΜL (ref 1.85–7.62)
NEUTS SEG NFR BLD AUTO: 56 % (ref 43–75)
NRBC BLD AUTO-RTO: 0 /100 WBCS
PLATELET # BLD AUTO: 229 THOUSANDS/UL (ref 149–390)
PMV BLD AUTO: 9.2 FL (ref 8.9–12.7)
POTASSIUM SERPL-SCNC: 4.4 MMOL/L (ref 3.5–5.3)
PROT SERPL-MCNC: 6.6 G/DL (ref 6.4–8.4)
RBC # BLD AUTO: 3.5 MILLION/UL (ref 3.81–5.12)
SODIUM SERPL-SCNC: 142 MMOL/L (ref 135–147)
T4 FREE SERPL-MCNC: 0.25 NG/DL (ref 0.61–1.12)
TIBC SERPL-MCNC: 199 UG/DL (ref 250–450)
TRIGL SERPL-MCNC: 108 MG/DL
TSH SERPL DL<=0.05 MIU/L-ACNC: 67.63 UIU/ML (ref 0.45–4.5)
UIBC SERPL-MCNC: 137 UG/DL (ref 155–355)
WBC # BLD AUTO: 6.5 THOUSAND/UL (ref 4.31–10.16)

## 2024-01-25 PROCEDURE — 83550 IRON BINDING TEST: CPT

## 2024-01-25 PROCEDURE — 82607 VITAMIN B-12: CPT

## 2024-01-25 PROCEDURE — 84443 ASSAY THYROID STIM HORMONE: CPT

## 2024-01-25 PROCEDURE — 36415 COLL VENOUS BLD VENIPUNCTURE: CPT

## 2024-01-25 PROCEDURE — 82306 VITAMIN D 25 HYDROXY: CPT

## 2024-01-25 PROCEDURE — 82728 ASSAY OF FERRITIN: CPT

## 2024-01-25 PROCEDURE — 83540 ASSAY OF IRON: CPT

## 2024-01-25 PROCEDURE — 85025 COMPLETE CBC W/AUTO DIFF WBC: CPT

## 2024-01-25 PROCEDURE — 84439 ASSAY OF FREE THYROXINE: CPT

## 2024-01-25 PROCEDURE — 80053 COMPREHEN METABOLIC PANEL: CPT

## 2024-01-25 PROCEDURE — 80061 LIPID PANEL: CPT

## 2024-01-25 RX ORDER — LEVOTHYROXINE SODIUM 0.1 MG/1
TABLET ORAL
Qty: 90 TABLET | Refills: 1 | Status: SHIPPED | OUTPATIENT
Start: 2024-01-25

## 2024-01-26 LAB — VIT B12 SERPL-MCNC: 181 PG/ML (ref 180–914)

## 2024-01-30 ENCOUNTER — TELEPHONE (OUTPATIENT)
Dept: LAB | Facility: HOSPITAL | Age: 74
End: 2024-01-30

## 2024-02-05 ENCOUNTER — RA CDI HCC (OUTPATIENT)
Dept: OTHER | Facility: HOSPITAL | Age: 74
End: 2024-02-05

## 2024-02-12 ENCOUNTER — OFFICE VISIT (OUTPATIENT)
Dept: INTERNAL MEDICINE CLINIC | Age: 74
End: 2024-02-12
Payer: MEDICARE

## 2024-02-12 VITALS
DIASTOLIC BLOOD PRESSURE: 90 MMHG | OXYGEN SATURATION: 98 % | TEMPERATURE: 97.8 F | SYSTOLIC BLOOD PRESSURE: 140 MMHG | HEART RATE: 88 BPM

## 2024-02-12 DIAGNOSIS — Z79.899 MEDICAL MARIJUANA USE: ICD-10-CM

## 2024-02-12 DIAGNOSIS — Z78.9 SELF-CATHETERIZES URINARY BLADDER: ICD-10-CM

## 2024-02-12 DIAGNOSIS — Z00.00 MEDICARE ANNUAL WELLNESS VISIT, SUBSEQUENT: ICD-10-CM

## 2024-02-12 DIAGNOSIS — N31.9 NEUROGENIC BLADDER: ICD-10-CM

## 2024-02-12 DIAGNOSIS — E03.9 ACQUIRED HYPOTHYROIDISM: ICD-10-CM

## 2024-02-12 DIAGNOSIS — K59.03 DRUG-INDUCED CONSTIPATION: ICD-10-CM

## 2024-02-12 DIAGNOSIS — E78.00 HYPERCHOLESTEREMIA: ICD-10-CM

## 2024-02-12 DIAGNOSIS — G35 MULTIPLE SCLEROSIS (HCC): ICD-10-CM

## 2024-02-12 DIAGNOSIS — N39.0 CHRONIC UTI: ICD-10-CM

## 2024-02-12 DIAGNOSIS — E55.9 VITAMIN D DEFICIENCY: Primary | ICD-10-CM

## 2024-02-12 DIAGNOSIS — Z99.3 WHEELCHAIR DEPENDENCE: ICD-10-CM

## 2024-02-12 DIAGNOSIS — F33.9 DEPRESSION, RECURRENT (HCC): ICD-10-CM

## 2024-02-12 DIAGNOSIS — F11.20 CONTINUOUS OPIOID DEPENDENCE (HCC): ICD-10-CM

## 2024-02-12 PROCEDURE — 99214 OFFICE O/P EST MOD 30 MIN: CPT | Performed by: FAMILY MEDICINE

## 2024-02-12 PROCEDURE — G0439 PPPS, SUBSEQ VISIT: HCPCS | Performed by: FAMILY MEDICINE

## 2024-02-12 NOTE — PATIENT INSTRUCTIONS
Medicare Preventive Visit Patient Instructions  Thank you for completing your Welcome to Medicare Visit or Medicare Annual Wellness Visit today. Your next wellness visit will be due in one year (2/12/2025).  The screening/preventive services that you may require over the next 5-10 years are detailed below. Some tests may not apply to you based off risk factors and/or age. Screening tests ordered at today's visit but not completed yet may show as past due. Also, please note that scanned in results may not display below.  Preventive Screenings:  Service Recommendations Previous Testing/Comments   Colorectal Cancer Screening  * Colonoscopy    * Fecal Occult Blood Test (FOBT)/Fecal Immunochemical Test (FIT)  * Fecal DNA/Cologuard Test  * Flexible Sigmoidoscopy Age: 45-75 years old   Colonoscopy: every 10 years (may be performed more frequently if at higher risk)  OR  FOBT/FIT: every 1 year  OR  Cologuard: every 3 years  OR  Sigmoidoscopy: every 5 years  Screening may be recommended earlier than age 45 if at higher risk for colorectal cancer. Also, an individualized decision between you and your healthcare provider will decide whether screening between the ages of 76-85 would be appropriate. Colonoscopy: 07/27/2022  FOBT/FIT: 07/27/2022  Cologuard: 07/27/2022  Sigmoidoscopy: 07/27/2022    Screening Current     Breast Cancer Screening Age: 40+ years old  Frequency: every 1-2 years  Not required if history of left and right mastectomy Mammogram: 10/14/2021        Cervical Cancer Screening Between the ages of 21-29, pap smear recommended once every 3 years.   Between the ages of 30-65, can perform pap smear with HPV co-testing every 5 years.   Recommendations may differ for women with a history of total hysterectomy, cervical cancer, or abnormal pap smears in past. Pap Smear: Not on file    Screening Not Indicated   Hepatitis C Screening Once for adults born between 1945 and 1965  More frequently in patients at high risk  for Hepatitis C Hep C Antibody: 12/21/2018    Screening Current   Diabetes Screening 1-2 times per year if you're at risk for diabetes or have pre-diabetes Fasting glucose: No results in last 5 years (No results in last 5 years)  A1C: 5.6 % (11/23/2020)  Screening Current   Cholesterol Screening Once every 5 years if you don't have a lipid disorder. May order more often based on risk factors. Lipid panel: 01/25/2024    Screening Current     Other Preventive Screenings Covered by Medicare:  Abdominal Aortic Aneurysm (AAA) Screening: covered once if your at risk. You're considered to be at risk if you have a family history of AAA.  Lung Cancer Screening: covers low dose CT scan once per year if you meet all of the following conditions: (1) Age 55-77; (2) No signs or symptoms of lung cancer; (3) Current smoker or have quit smoking within the last 15 years; (4) You have a tobacco smoking history of at least 20 pack years (packs per day multiplied by number of years you smoked); (5) You get a written order from a healthcare provider.  Glaucoma Screening: covered annually if you're considered high risk: (1) You have diabetes OR (2) Family history of glaucoma OR (3)  aged 50 and older OR (4)  American aged 65 and older  Osteoporosis Screening: covered every 2 years if you meet one of the following conditions: (1) You're estrogen deficient and at risk for osteoporosis based off medical history and other findings; (2) Have a vertebral abnormality; (3) On glucocorticoid therapy for more than 3 months; (4) Have primary hyperparathyroidism; (5) On osteoporosis medications and need to assess response to drug therapy.   Last bone density test (DXA Scan): Not on file.  HIV Screening: covered annually if you're between the age of 15-65. Also covered annually if you are younger than 15 and older than 65 with risk factors for HIV infection. For pregnant patients, it is covered up to 3 times per  pregnancy.    Immunizations:  Immunization Recommendations   Influenza Vaccine Annual influenza vaccination during flu season is recommended for all persons aged >= 6 months who do not have contraindications   Pneumococcal Vaccine   * Pneumococcal conjugate vaccine = PCV13 (Prevnar 13), PCV15 (Vaxneuvance), PCV20 (Prevnar 20)  * Pneumococcal polysaccharide vaccine = PPSV23 (Pneumovax) Adults 19-63 yo with certain risk factors or if 65+ yo  If never received any pneumonia vaccine: recommend Prevnar 20 (PCV20)  Give PCV20 if previously received 1 dose of PCV13 or PPSV23   Hepatitis B Vaccine 3 dose series if at intermediate or high risk (ex: diabetes, end stage renal disease, liver disease)   Respiratory syncytial virus (RSV) Vaccine - COVERED BY MEDICARE PART D  * RSVPreF3 (Arexvy) CDC recommends that adults 60 years of age and older may receive a single dose of RSV vaccine using shared clinical decision-making (SCDM)   Tetanus (Td) Vaccine - COST NOT COVERED BY MEDICARE PART B Following completion of primary series, a booster dose should be given every 10 years to maintain immunity against tetanus. Td may also be given as tetanus wound prophylaxis.   Tdap Vaccine - COST NOT COVERED BY MEDICARE PART B Recommended at least once for all adults. For pregnant patients, recommended with each pregnancy.   Shingles Vaccine (Shingrix) - COST NOT COVERED BY MEDICARE PART B  2 shot series recommended in those 19 years and older who have or will have weakened immune systems or those 50 years and older     Health Maintenance Due:      Topic Date Due   • Breast Cancer Screening: Mammogram  10/14/2022   • Colorectal Cancer Screening  07/24/2032   • Hepatitis C Screening  Completed     Immunizations Due:      Topic Date Due   • COVID-19 Vaccine (4 - 2023-24 season) 09/01/2023     Advance Directives   What are advance directives?  Advance directives are legal documents that state your wishes and plans for medical care. These plans  are made ahead of time in case you lose your ability to make decisions for yourself. Advance directives can apply to any medical decision, such as the treatments you want, and if you want to donate organs.   What are the types of advance directives?  There are many types of advance directives, and each state has rules about how to use them. You may choose a combination of any of the following:  Living will:  This is a written record of the treatment you want. You can also choose which treatments you do not want, which to limit, and which to stop at a certain time. This includes surgery, medicine, IV fluid, and tube feedings.   Durable power of  for healthcare (DPAHC):  This is a written record that states who you want to make healthcare choices for you when you are unable to make them for yourself. This person, called a proxy, is usually a family member or a friend. You may choose more than 1 proxy.  Do not resuscitate (DNR) order:  A DNR order is used in case your heart stops beating or you stop breathing. It is a request not to have certain forms of treatment, such as CPR. A DNR order may be included in other types of advance directives.  Medical directive:  This covers the care that you want if you are in a coma, near death, or unable to make decisions for yourself. You can list the treatments you want for each condition. Treatment may include pain medicine, surgery, blood transfusions, dialysis, IV or tube feedings, and a ventilator (breathing machine).  Values history:  This document has questions about your views, beliefs, and how you feel and think about life. This information can help others choose the care that you would choose.  Why are advance directives important?  An advance directive helps you control your care. Although spoken wishes may be used, it is better to have your wishes written down. Spoken wishes can be misunderstood, or not followed. Treatments may be given even if you do not want  them. An advance directive may make it easier for your family to make difficult choices about your care.   Weight Management   Why it is important to manage your weight:  Being overweight increases your risk of health conditions such as heart disease, high blood pressure, type 2 diabetes, and certain types of cancer. It can also increase your risk for osteoarthritis, sleep apnea, and other respiratory problems. Aim for a slow, steady weight loss. Even a small amount of weight loss can lower your risk of health problems.  How to lose weight safely:  A safe and healthy way to lose weight is to eat fewer calories and get regular exercise. You can lose up about 1 pound a week by decreasing the number of calories you eat by 500 calories each day.   Healthy meal plan for weight management:  A healthy meal plan includes a variety of foods, contains fewer calories, and helps you stay healthy. A healthy meal plan includes the following:  Eat whole-grain foods more often.  A healthy meal plan should contain fiber. Fiber is the part of grains, fruits, and vegetables that is not broken down by your body. Whole-grain foods are healthy and provide extra fiber in your diet. Some examples of whole-grain foods are whole-wheat breads and pastas, oatmeal, brown rice, and bulgur.  Eat a variety of vegetables every day.  Include dark, leafy greens such as spinach, kale, jovani greens, and mustard greens. Eat yellow and orange vegetables such as carrots, sweet potatoes, and winter squash.   Eat a variety of fruits every day.  Choose fresh or canned fruit (canned in its own juice or light syrup) instead of juice. Fruit juice has very little or no fiber.  Eat low-fat dairy foods.  Drink fat-free (skim) milk or 1% milk. Eat fat-free yogurt and low-fat cottage cheese. Try low-fat cheeses such as mozzarella and other reduced-fat cheeses.  Choose meat and other protein foods that are low in fat.  Choose beans or other legumes such as split  peas or lentils. Choose fish, skinless poultry (chicken or turkey), or lean cuts of red meat (beef or pork). Before you cook meat or poultry, cut off any visible fat.   Use less fat and oil.  Try baking foods instead of frying them. Add less fat, such as margarine, sour cream, regular salad dressing and mayonnaise to foods. Eat fewer high-fat foods. Some examples of high-fat foods include french fries, doughnuts, ice cream, and cakes.  Eat fewer sweets.  Limit foods and drinks that are high in sugar. This includes candy, cookies, regular soda, and sweetened drinks.  Exercise:  Exercise at least 30 minutes per day on most days of the week. Some examples of exercise include walking, biking, dancing, and swimming. You can also fit in more physical activity by taking the stairs instead of the elevator or parking farther away from stores. Ask your healthcare provider about the best exercise plan for you.   Narcotic (Opioid) Safety    Use narcotics safely:  Take prescribed narcotics exactly as directed  Do not give narcotics to others or take narcotics that belong to someone else  Do not mix narcotics without medicines or alcohol  Do not drive or operate heavy machinery after you take the narcotic  Monitor for side effects and notify your healthcare provider if you experienced side effects such as nausea, sleepiness, itching, or trouble thinking clearly.    Manage constipation:    Constipation is the most common side effect of narcotic medicine. Constipation is when you have hard, dry bowel movements, or you go longer than usual between bowel movements. Tell your healthcare provider about all changes in your bowel movements while you are taking narcotics. He or she may recommend laxative medicine to help you have a bowel movement. He or she may also change the kind of narcotic you are taking, or change when you take it. The following are more ways you can prevent or relieve constipation:    Drink liquids as directed.  You  may need to drink extra liquids to help soften and move your bowels. Ask how much liquid to drink each day and which liquids are best for you.  Eat high-fiber foods.  This may help decrease constipation by adding bulk to your bowel movements. High-fiber foods include fruits, vegetables, whole-grain breads and cereals, and beans. Your healthcare provider or dietitian can help you create a high-fiber meal plan. Your provider may also recommend a fiber supplement if you cannot get enough fiber from food.  Exercise regularly.  Regular physical activity can help stimulate your intestines. Walking is a good exercise to prevent or relieve constipation. Ask which exercises are best for you.  Schedule a time each day to have a bowel movement.  This may help train your body to have regular bowel movements. Bend forward while you are on the toilet to help move the bowel movement out. Sit on the toilet for at least 10 minutes, even if you do not have a bowel movement.    Store narcotics safely:   Store narcotics where others cannot easily get them.  Keep them in a locked cabinet or secure area. Do not  keep them in a purse or other bag you carry with you. A person may be looking for something else and find the narcotics.  Make sure narcotics are stored out of the reach of children.  A child can easily overdose on narcotics. Narcotics may look like candy to a small child.    The best way to dispose of narcotics:      The laws vary by country and area. In the [place], the best way is to return the narcotics through a take-back program. This program is offered by the [place] Drug Enforcement Agency ([employer]). The following are options for using the program:  Take the narcotics to a [employer] collection site.  The site is often a law enforcement center. Call your local law enforcement center for scheduled take-back days in your area. You will be given information on where to go if the collection site is in a different location.  Take the  narcotics to an approved pharmacy or hospital.  A pharmacy or hospital may be set up as a collection site. You will need to ask if it is a [employer] collection site if you were not directed there. A pharmacy or doctor's office may not be able to take back narcotics unless it is a [employer] site.  Use a mail-back system.  This means you are given containers to put the narcotics into. You will then mail them in the containers.  Use a take-back drop box.  This is a place to leave the narcotics at any time. People and animals will not be able to get into the box. Your local law enforcement agency can tell you where to find a drop box in your area.    Other ways to manage pain:   Ask your healthcare provider about non-narcotic medicines to control pain.  Nonprescription medicines include NSAIDs (such as ibuprofen) and acetaminophen. Prescription medicines include muscle relaxers, antidepressants, and steroids.  Pain may be managed without any medicines.  Some ways to relieve pain include massage, aromatherapy, or meditation. Physical or occupational therapy may also help.    For more information:   Drug Enforcement Administration  [address] [place] [address]  Phone: 3- 071 - [phone]  Web Address: https://www.deadiversion.[facility].gov/drug_disposal/    [place] Food and Drug Administration  [address] [place] [address]  Phone: 3- 076 - 601-4105  Web Address: http://www.fda.gov     © Copyright [employer] 2018 Information is for End User's use only and may not be sold, redistributed or otherwise used for commercial purposes. All illustrations and images included in CareNotes® are the copyrighted property of [employer] or [employer]

## 2024-02-12 NOTE — PROGRESS NOTES
Assessment/Plan:    1. Vitamin D deficiency    2. Wheelchair dependence    3. Multiple sclerosis (HCC)    4. Chronic UTI    5. Acquired hypothyroidism  -     TSH, 3rd generation with Free T4 reflex; Future; Expected date: 08/12/2024    6. Neurogenic bladder    7. Medicare annual wellness visit, subsequent    8. Depression, recurrent (HCC)    9. Hypercholesteremia  -     Lipid Panel with Direct LDL reflex; Future; Expected date: 08/12/2024  -     Comprehensive metabolic panel; Future; Expected date: 08/12/2024    10. Drug-induced constipation    11. Medical marijuana use    12. Self-catheterizes urinary bladder    13. Continuous opioid dependence (HCC)    Continue with 300 mcg of levothyroxine.  Check TSH and T4 at the end of this month and repeat again in 6 months routinely however patient is aware that we may need to do other blood work if thyroid level is not approaching normal.  Continue with appointment with pain management.  As needed self-catheterization.  No urine issues at this time.  All the above was discussed with the patient's  who accompanies her today as well.        Patient Instructions   Medicare Preventive Visit Patient Instructions  Thank you for completing your Welcome to Medicare Visit or Medicare Annual Wellness Visit today. Your next wellness visit will be due in one year (2/12/2025).  The screening/preventive services that you may require over the next 5-10 years are detailed below. Some tests may not apply to you based off risk factors and/or age. Screening tests ordered at today's visit but not completed yet may show as past due. Also, please note that scanned in results may not display below.  Preventive Screenings:  Service Recommendations Previous Testing/Comments   Colorectal Cancer Screening  * Colonoscopy    * Fecal Occult Blood Test (FOBT)/Fecal Immunochemical Test (FIT)  * Fecal DNA/Cologuard Test  * Flexible Sigmoidoscopy Age: 45-75 years old   Colonoscopy: every 10 years  (may be performed more frequently if at higher risk)  OR  FOBT/FIT: every 1 year  OR  Cologuard: every 3 years  OR  Sigmoidoscopy: every 5 years  Screening may be recommended earlier than age 45 if at higher risk for colorectal cancer. Also, an individualized decision between you and your healthcare provider will decide whether screening between the ages of 76-85 would be appropriate. Colonoscopy: 07/27/2022  FOBT/FIT: 07/27/2022  Cologuard: 07/27/2022  Sigmoidoscopy: 07/27/2022    Screening Current     Breast Cancer Screening Age: 40+ years old  Frequency: every 1-2 years  Not required if history of left and right mastectomy Mammogram: 10/14/2021        Cervical Cancer Screening Between the ages of 21-29, pap smear recommended once every 3 years.   Between the ages of 30-65, can perform pap smear with HPV co-testing every 5 years.   Recommendations may differ for women with a history of total hysterectomy, cervical cancer, or abnormal pap smears in past. Pap Smear: Not on file    Screening Not Indicated   Hepatitis C Screening Once for adults born between 1945 and 1965  More frequently in patients at high risk for Hepatitis C Hep C Antibody: 12/21/2018    Screening Current   Diabetes Screening 1-2 times per year if you're at risk for diabetes or have pre-diabetes Fasting glucose: No results in last 5 years (No results in last 5 years)  A1C: 5.6 % (11/23/2020)  Screening Current   Cholesterol Screening Once every 5 years if you don't have a lipid disorder. May order more often based on risk factors. Lipid panel: 01/25/2024    Screening Current     Other Preventive Screenings Covered by Medicare:  Abdominal Aortic Aneurysm (AAA) Screening: covered once if your at risk. You're considered to be at risk if you have a family history of AAA.  Lung Cancer Screening: covers low dose CT scan once per year if you meet all of the following conditions: (1) Age 55-77; (2) No signs or symptoms of lung cancer; (3) Current smoker  or have quit smoking within the last 15 years; (4) You have a tobacco smoking history of at least 20 pack years (packs per day multiplied by number of years you smoked); (5) You get a written order from a healthcare provider.  Glaucoma Screening: covered annually if you're considered high risk: (1) You have diabetes OR (2) Family history of glaucoma OR (3)  aged 50 and older OR (4)  American aged 65 and older  Osteoporosis Screening: covered every 2 years if you meet one of the following conditions: (1) You're estrogen deficient and at risk for osteoporosis based off medical history and other findings; (2) Have a vertebral abnormality; (3) On glucocorticoid therapy for more than 3 months; (4) Have primary hyperparathyroidism; (5) On osteoporosis medications and need to assess response to drug therapy.   Last bone density test (DXA Scan): Not on file.  HIV Screening: covered annually if you're between the age of 15-65. Also covered annually if you are younger than 15 and older than 65 with risk factors for HIV infection. For pregnant patients, it is covered up to 3 times per pregnancy.    Immunizations:  Immunization Recommendations   Influenza Vaccine Annual influenza vaccination during flu season is recommended for all persons aged >= 6 months who do not have contraindications   Pneumococcal Vaccine   * Pneumococcal conjugate vaccine = PCV13 (Prevnar 13), PCV15 (Vaxneuvance), PCV20 (Prevnar 20)  * Pneumococcal polysaccharide vaccine = PPSV23 (Pneumovax) Adults 19-63 yo with certain risk factors or if 65+ yo  If never received any pneumonia vaccine: recommend Prevnar 20 (PCV20)  Give PCV20 if previously received 1 dose of PCV13 or PPSV23   Hepatitis B Vaccine 3 dose series if at intermediate or high risk (ex: diabetes, end stage renal disease, liver disease)   Respiratory syncytial virus (RSV) Vaccine - COVERED BY MEDICARE PART D  * RSVPreF3 (Arexvy) CDC recommends that adults 60 years of age  and older may receive a single dose of RSV vaccine using shared clinical decision-making (SCDM)   Tetanus (Td) Vaccine - COST NOT COVERED BY MEDICARE PART B Following completion of primary series, a booster dose should be given every 10 years to maintain immunity against tetanus. Td may also be given as tetanus wound prophylaxis.   Tdap Vaccine - COST NOT COVERED BY MEDICARE PART B Recommended at least once for all adults. For pregnant patients, recommended with each pregnancy.   Shingles Vaccine (Shingrix) - COST NOT COVERED BY MEDICARE PART B  2 shot series recommended in those 19 years and older who have or will have weakened immune systems or those 50 years and older     Health Maintenance Due:      Topic Date Due    Breast Cancer Screening: Mammogram  10/14/2022    Colorectal Cancer Screening  07/24/2032    Hepatitis C Screening  Completed     Immunizations Due:      Topic Date Due    COVID-19 Vaccine (4 - 2023-24 season) 09/01/2023     Advance Directives   What are advance directives?  Advance directives are legal documents that state your wishes and plans for medical care. These plans are made ahead of time in case you lose your ability to make decisions for yourself. Advance directives can apply to any medical decision, such as the treatments you want, and if you want to donate organs.   What are the types of advance directives?  There are many types of advance directives, and each state has rules about how to use them. You may choose a combination of any of the following:  Living will:  This is a written record of the treatment you want. You can also choose which treatments you do not want, which to limit, and which to stop at a certain time. This includes surgery, medicine, IV fluid, and tube feedings.   Durable power of  for healthcare (DPAHC):  This is a written record that states who you want to make healthcare choices for you when you are unable to make them for yourself. This person, called pietro  proxy, is usually a family member or a friend. You may choose more than 1 proxy.  Do not resuscitate (DNR) order:  A DNR order is used in case your heart stops beating or you stop breathing. It is a request not to have certain forms of treatment, such as CPR. A DNR order may be included in other types of advance directives.  Medical directive:  This covers the care that you want if you are in a coma, near death, or unable to make decisions for yourself. You can list the treatments you want for each condition. Treatment may include pain medicine, surgery, blood transfusions, dialysis, IV or tube feedings, and a ventilator (breathing machine).  Values history:  This document has questions about your views, beliefs, and how you feel and think about life. This information can help others choose the care that you would choose.  Why are advance directives important?  An advance directive helps you control your care. Although spoken wishes may be used, it is better to have your wishes written down. Spoken wishes can be misunderstood, or not followed. Treatments may be given even if you do not want them. An advance directive may make it easier for your family to make difficult choices about your care.   Weight Management   Why it is important to manage your weight:  Being overweight increases your risk of health conditions such as heart disease, high blood pressure, type 2 diabetes, and certain types of cancer. It can also increase your risk for osteoarthritis, sleep apnea, and other respiratory problems. Aim for a slow, steady weight loss. Even a small amount of weight loss can lower your risk of health problems.  How to lose weight safely:  A safe and healthy way to lose weight is to eat fewer calories and get regular exercise. You can lose up about 1 pound a week by decreasing the number of calories you eat by 500 calories each day.   Healthy meal plan for weight management:  A healthy meal plan includes a variety of  foods, contains fewer calories, and helps you stay healthy. A healthy meal plan includes the following:  Eat whole-grain foods more often.  A healthy meal plan should contain fiber. Fiber is the part of grains, fruits, and vegetables that is not broken down by your body. Whole-grain foods are healthy and provide extra fiber in your diet. Some examples of whole-grain foods are whole-wheat breads and pastas, oatmeal, brown rice, and bulgur.  Eat a variety of vegetables every day.  Include dark, leafy greens such as spinach, kale, jovani greens, and mustard greens. Eat yellow and orange vegetables such as carrots, sweet potatoes, and winter squash.   Eat a variety of fruits every day.  Choose fresh or canned fruit (canned in its own juice or light syrup) instead of juice. Fruit juice has very little or no fiber.  Eat low-fat dairy foods.  Drink fat-free (skim) milk or 1% milk. Eat fat-free yogurt and low-fat cottage cheese. Try low-fat cheeses such as mozzarella and other reduced-fat cheeses.  Choose meat and other protein foods that are low in fat.  Choose beans or other legumes such as split peas or lentils. Choose fish, skinless poultry (chicken or turkey), or lean cuts of red meat (beef or pork). Before you cook meat or poultry, cut off any visible fat.   Use less fat and oil.  Try baking foods instead of frying them. Add less fat, such as margarine, sour cream, regular salad dressing and mayonnaise to foods. Eat fewer high-fat foods. Some examples of high-fat foods include french fries, doughnuts, ice cream, and cakes.  Eat fewer sweets.  Limit foods and drinks that are high in sugar. This includes candy, cookies, regular soda, and sweetened drinks.  Exercise:  Exercise at least 30 minutes per day on most days of the week. Some examples of exercise include walking, biking, dancing, and swimming. You can also fit in more physical activity by taking the stairs instead of the elevator or parking farther away from  stores. Ask your healthcare provider about the best exercise plan for you.   Narcotic (Opioid) Safety    Use narcotics safely:  Take prescribed narcotics exactly as directed  Do not give narcotics to others or take narcotics that belong to someone else  Do not mix narcotics without medicines or alcohol  Do not drive or operate heavy machinery after you take the narcotic  Monitor for side effects and notify your healthcare provider if you experienced side effects such as nausea, sleepiness, itching, or trouble thinking clearly.    Manage constipation:    Constipation is the most common side effect of narcotic medicine. Constipation is when you have hard, dry bowel movements, or you go longer than usual between bowel movements. Tell your healthcare provider about all changes in your bowel movements while you are taking narcotics. He or she may recommend laxative medicine to help you have a bowel movement. He or she may also change the kind of narcotic you are taking, or change when you take it. The following are more ways you can prevent or relieve constipation:    Drink liquids as directed.  You may need to drink extra liquids to help soften and move your bowels. Ask how much liquid to drink each day and which liquids are best for you.  Eat high-fiber foods.  This may help decrease constipation by adding bulk to your bowel movements. High-fiber foods include fruits, vegetables, whole-grain breads and cereals, and beans. Your healthcare provider or dietitian can help you create a high-fiber meal plan. Your provider may also recommend a fiber supplement if you cannot get enough fiber from food.  Exercise regularly.  Regular physical activity can help stimulate your intestines. Walking is a good exercise to prevent or relieve constipation. Ask which exercises are best for you.  Schedule a time each day to have a bowel movement.  This may help train your body to have regular bowel movements. Bend forward while you are on  the toilet to help move the bowel movement out. Sit on the toilet for at least 10 minutes, even if you do not have a bowel movement.    Store narcotics safely:   Store narcotics where others cannot easily get them.  Keep them in a locked cabinet or secure area. Do not  keep them in a purse or other bag you carry with you. A person may be looking for something else and find the narcotics.  Make sure narcotics are stored out of the reach of children.  A child can easily overdose on narcotics. Narcotics may look like candy to a small child.    The best way to dispose of narcotics:      The laws vary by country and area. In the [place], the best way is to return the narcotics through a take-back program. This program is offered by the [place] Drug Enforcement Agency (ROYA). The following are options for using the program:  Take the narcotics to a ROYA collection site.  The site is often a law enforcement center. Call your local law enforcement center for scheduled take-back days in your area. You will be given information on where to go if the collection site is in a different location.  Take the narcotics to an approved pharmacy or hospital.  A pharmacy or hospital may be set up as a collection site. You will need to ask if it is a ROYA collection site if you were not directed there. A pharmacy or doctor's office may not be able to take back narcotics unless it is a ROYA site.  Use a mail-back system.  This means you are given containers to put the narcotics into. You will then mail them in the containers.  Use a take-back drop box.  This is a place to leave the narcotics at any time. People and animals will not be able to get into the box. Your local law enforcement agency can tell you where to find a drop box in your area.    Other ways to manage pain:   Ask your healthcare provider about non-narcotic medicines to control pain.  Nonprescription medicines include NSAIDs (such as ibuprofen) and acetaminophen. Prescription  medicines include muscle relaxers, antidepressants, and steroids.  Pain may be managed without any medicines.  Some ways to relieve pain include massage, aromatherapy, or meditation. Physical or occupational therapy may also help.    For more information:   Drug Enforcement Administration  [address] [place] [address]  Phone: 0- 543 - 618-3522  Web Address: https://www.deadiversion.[employer].gov/drug_disposal/    [place] Food and Drug Administration  [address] [place] [address]  Phone: 8- 879 - 574-8683  Web Address: http://www.fda.gov     © Copyright [employer] 2018 Information is for End User's use only and may not be sold, redistributed or otherwise used for commercial purposes. All illustrations and images included in CareNotes® are the copyrighted property of [employer]. or [employer]         Return in about 6 months (around 8/12/2024).    Subjective:      Patient ID: [place] Bunn is a 73 y.o. female.    Chief Complaint   Patient presents with    Follow-up    Medicare Wellness Visit         Mammogram - patient will schedule        HPI    Vitamin-D deficiency.  Denies high fatty starchy diet level checked recently is at 19.  She is not taking any over-the-counter medications.  Two thousand nineteen, did not having lab work done July 2021, stop taking vitamin D and has not had any lab work done since fall 2020 February 2022, according to her she is not taking any vitamin-D supplementation.  Last levels were lower checked 1 year ago.  No updated accurate blood work  January 2024, taking vitamin D, no blood work since almost 2 years now  February 2024, vitamin D 72, on replacement     Hypercholesterolemia, unknown family history from mom and dad since she is adopted however her biological sister has high cholesterol is being treated for.  She is not sure where her level has been in the past.  LDL is elevated as well as triglycerides  July 2021, due for labs    February 2022, no updated blood work  January 2024, no recent blood work  February 2024, , total cholesterol 233, levels are similar to what they were in the past, not on medications at the moment and patient is nonambulatory     Opiate induced constipation, much better and requiring less opioids.  Started medical marijuana and doing well  February 2022, uses Dulcolax 8 tablets to have a successful bowel movement.  Uses magnesium citrate as a last resort.  Has never tried Senokot  January 2024, uses supplementation to help with bowel movements  February 2024, stable     Neuropathy, started medical marijuana and neuropathy symptoms have significantly improved.  Requiring less pain medication.  Did well with increased dose of Lyrica to twice a day and would like to continue with this dose.  January 2024, looking for pain management doctor that is close to her home as her  has issues traveling and driving.  Thinks her neuropathy may be getting worse, never tried Cymbalta  February 2024, stable, has an appointment with pain management doctor for evaluation for spinal stimulator.  Also taking medical marijuana.    Hypothyroidism: No blood work in a few years, most recent level shows TSH is 67.  Patient has dry skin constipation muscle aches and pains anxiety issues and insomnia.  She has not had any low blood work but states she is compliant with her medication.  At result she was called and told to increase to 300 mcg daily which she states she is doing but not taking on an empty stomach.  She has an appointment at the end of this month for blood test home draw    The following portions of the patient's history were reviewed and updated as appropriate: allergies, current medications, past family history, past medical history, past social history, past surgical history and problem list.    Review of Systems        Constitutional:  Denies fever or chills   Eyes:  Denies double , blurry vision or eye  pain  HENT:  Denies nasal congestion, sore throat or new hearing issues  Respiratory:  Denies cough or shortness of breath or wheezing  Cardiovascular:  Denies palpitations or chest pain  GI:  Denies abdominal pain, nausea, or vomiting, no loose stools, no reflux  Integument:  Denies rash , no open areas  Neurologic:  Denies headache or focal weakness, no dizziness  : no dysuria, or hematuria    Current Outpatient Medications   Medication Sig Dispense Refill    ALPRAZolam (XANAX) 1 mg tablet Take 1 tablet (1 mg total) by mouth 2 (two) times a day as needed for anxiety 60 tablet 1    aspirin-dipyridamole (AGGRENOX)  mg per 12 hr capsule Take 1 capsule by mouth every 12 (twelve) hours      baclofen 10 mg tablet Take 1 tablet (10 mg total) by mouth 4 (four) times a day 360 tablet 1    bisacodyl (DULCOLAX) 5 mg EC tablet Please follow prep instructions provided by the office. (Patient taking differently: Take 5 mg by mouth daily as needed for constipation) 2 tablet 0    buPROPion (WELLBUTRIN SR) 200 MG 12 hr tablet Take 1 tablet by mouth 2 (two) times a day        carBAMazepine (TEGretol XR) 200 mg 12 hr tablet Take 200 mg by mouth 2 (two) times a day        DULoxetine (CYMBALTA) 30 mg delayed release capsule Take 1 capsule (30 mg total) by mouth daily 30 capsule 5    ergocalciferol (VITAMIN D2) 50,000 units Take 1 capsule (50,000 Units total) by mouth once a week 12 capsule 1    levothyroxine (Synthroid) 100 mcg tablet Take with 200 mcg daily total 300 mcg 90 tablet 1    levothyroxine 200 mcg tablet TAKE 1 TABLET BY MOUTH  DAILY      Linzess 290 MCG CAPS Take 290 mcg by mouth in the morning      methadone (DOLOPHINE) 10 mg tablet Take two tabs by mouth three times daily and three tabs at bedtime.  Long term medication,      methylphenidate (RITALIN) 20 MG tablet Three tabs daily - Long term medication      morphine (MS CONTIN) 60 mg 12 hr tablet 2 tabs in am; 1 tab mid-morning; 1 tab mid afternoon; 2 tabs at  bedtime.  Long term medication      pantoprazole (PROTONIX) 40 mg tablet Take 1 tablet (40 mg total) by mouth daily 90 tablet 1    PARoxetine (PAXIL) 30 mg tablet Take 1 tablet by mouth in the morning      prochlorperazine (COMPAZINE) 25 mg suppository Insert 25 mg into the rectum daily as needed for nausea or vomiting      traZODone (DESYREL) 150 mg tablet Take 1 tablet (150 mg total) by mouth daily at bedtime 90 tablet 1     No current facility-administered medications for this visit.       Objective:    /90 (BP Location: Left arm, Patient Position: Sitting, Cuff Size: Standard)   Pulse 88   Temp 97.8 °F (36.6 °C) (Temporal)   SpO2 98%        Physical Exam      Constitutional:  Well developed, well nourished, no acute distress, non-toxic appearance   Eyes:  PERRL, conjunctiva normal , non icteric sclera  HENT:  Atraumatic, oropharynx moist. Neck-  supple   Respiratory:  CTA b/l, normal breath sounds, no rales, no wheezing   Cardiovascular:  RRR, no murmurs, no LE edema b/l  GI:  Soft, nondistended, normal bowel sounds x 4, nontender, no organomegaly, no mass, no rebound, no guarding   Neurologic:  no focal deficits noted   Psychiatric:  Speech and behavior appropriate , AAO x 3  MS: in wheelchair       Jemma Goldsmith DO

## 2024-02-12 NOTE — PROGRESS NOTES
Assessment and Plan:     Problem List Items Addressed This Visit          Digestive    Drug-induced constipation       Endocrine    Acquired hypothyroidism    Relevant Orders    TSH, 3rd generation with Free T4 reflex       Nervous and Auditory    Multiple sclerosis (HCC)       Genitourinary    Chronic UTI       Other    Vitamin D deficiency - Primary    Wheelchair dependence    Medicare annual wellness visit, subsequent    Neurogenic bladder    Depression, recurrent (HCC)     Other Visit Diagnoses       Hypercholesteremia        Relevant Orders    Lipid Panel with Direct LDL reflex    Comprehensive metabolic panel          BMI counseling: the BMI is above normal. Nutrition recommendations include reducing portion sizes and increasing intake of lean protein. Exercise recommendations include moderate aerobic physical activity for 150 minutes/week.       Preventive health issues were discussed with patient, and age appropriate screening tests were ordered as noted in patient's After Visit Summary.  Personalized health advice and appropriate referrals for health education or preventive services given if needed, as noted in patient's After Visit Summary.     History of Present Illness:     Patient presents for a Medicare Wellness Visit    HPI   Patient Care Team:  Jemma Goldsmith DO as PCP - General (Family Medicine)     Review of Systems:     Review of Systems     Problem List:     Patient Active Problem List   Diagnosis    Vitamin D deficiency    Drug-induced constipation    Chronic pain    Multiple sclerosis (HCC)    Adopted    Wheelchair dependence    Medicare annual wellness visit, subsequent    Neurogenic bladder    Medical marijuana use    Excessive carbohydrate intake    Continuous opioid dependence (HCC)    Depression, recurrent (HCC)    Acquired hypothyroidism    Positive colorectal cancer screening using Cologuard test    Chronic UTI    Self-catheterizes urinary bladder      Past Medical and Surgical History:      Past Medical History:   Diagnosis Date    Acute cystitis     Anxiety     Arthritis     Cauda equina syndrome with neurogenic bladder (HCC)     Depression, recurrent (HCC) 2022    Disease of thyroid gland     Dysuria     Multiple sclerosis (HCC)     Neurogenic bladder     Nocturia     Rheumatoid arthritis (HCC)     UTI (urinary tract infection)      Past Surgical History:   Procedure Laterality Date    BREAST EXCISIONAL BIOPSY Bilateral     greater than 12 years ago    CYSTOSCOPY  2012    KNEE SURGERY        Family History:     Family History   Adopted: Yes   Problem Relation Age of Onset    No Known Problems Mother     No Known Problems Father       Social History:     Social History     Socioeconomic History    Marital status: /Civil Union     Spouse name: None    Number of children: None    Years of education: None    Highest education level: None   Occupational History    None   Tobacco Use    Smoking status: Former     Current packs/day: 0.00     Average packs/day: 0.3 packs/day for 3.0 years (0.8 ttl pk-yrs)     Types: Cigarettes     Start date:      Quit date:      Years since quittin.1    Smokeless tobacco: Never   Vaping Use    Vaping status: Some Days    Substances: THC   Substance and Sexual Activity    Alcohol use: No    Drug use: Yes     Types: Marijuana     Comment: Vape pen for pain - rarely - medical marijuana    Sexual activity: Not Currently   Other Topics Concern    None   Social History Narrative    None     Social Determinants of Health     Financial Resource Strain: Medium Risk (2024)    Overall Financial Resource Strain (CARDIA)     Difficulty of Paying Living Expenses: Somewhat hard   Food Insecurity: Not on file   Transportation Needs: No Transportation Needs (2024)    PRAPARE - Transportation     Lack of Transportation (Medical): No     Lack of Transportation (Non-Medical): No   Physical Activity: Not on file   Stress: Not on file   Social Connections:  Not on file   Intimate Partner Violence: Not on file   Housing Stability: Not on file      Medications and Allergies:     Current Outpatient Medications   Medication Sig Dispense Refill    ALPRAZolam (XANAX) 1 mg tablet Take 1 tablet (1 mg total) by mouth 2 (two) times a day as needed for anxiety 60 tablet 1    aspirin-dipyridamole (AGGRENOX)  mg per 12 hr capsule Take 1 capsule by mouth every 12 (twelve) hours      baclofen 10 mg tablet Take 1 tablet (10 mg total) by mouth 4 (four) times a day 360 tablet 1    bisacodyl (DULCOLAX) 5 mg EC tablet Please follow prep instructions provided by the office. (Patient taking differently: Take 5 mg by mouth daily as needed for constipation) 2 tablet 0    buPROPion (WELLBUTRIN SR) 200 MG 12 hr tablet Take 1 tablet by mouth 2 (two) times a day        carBAMazepine (TEGretol XR) 200 mg 12 hr tablet Take 200 mg by mouth 2 (two) times a day        DULoxetine (CYMBALTA) 30 mg delayed release capsule Take 1 capsule (30 mg total) by mouth daily 30 capsule 5    ergocalciferol (VITAMIN D2) 50,000 units Take 1 capsule (50,000 Units total) by mouth once a week 12 capsule 1    levothyroxine (Synthroid) 100 mcg tablet Take with 200 mcg daily total 300 mcg 90 tablet 1    levothyroxine 200 mcg tablet TAKE 1 TABLET BY MOUTH  DAILY      Linzess 290 MCG CAPS Take 290 mcg by mouth in the morning      methadone (DOLOPHINE) 10 mg tablet Take two tabs by mouth three times daily and three tabs at bedtime.  Long term medication,      methylphenidate (RITALIN) 20 MG tablet Three tabs daily - Long term medication      morphine (MS CONTIN) 60 mg 12 hr tablet 2 tabs in am; 1 tab mid-morning; 1 tab mid afternoon; 2 tabs at bedtime.  Long term medication      pantoprazole (PROTONIX) 40 mg tablet Take 1 tablet (40 mg total) by mouth daily 90 tablet 1    PARoxetine (PAXIL) 30 mg tablet Take 1 tablet by mouth in the morning      prochlorperazine (COMPAZINE) 25 mg suppository Insert 25 mg into the rectum  "daily as needed for nausea or vomiting      traZODone (DESYREL) 150 mg tablet Take 1 tablet (150 mg total) by mouth daily at bedtime 90 tablet 1     No current facility-administered medications for this visit.     Allergies   Allergen Reactions    Penicillins     Codeine Other (See Comments)     Other reaction(s): Other (See Comments)    can take morphine    Can take morphine    Patient reports \"I can take morphine with no reaction, but it was so long ago, I don't remember what kind of reaction I had.\" 12/20/2023      Immunizations:     Immunization History   Administered Date(s) Administered    COVID-19 PFIZER VACCINE 0.3 ML IM 03/04/2021, 03/25/2021, 10/09/2021    INFLUENZA 12/16/2013, 11/08/2020, 11/07/2021, 02/09/2023    Influenza, high dose seasonal 0.7 mL 12/07/2018, 02/09/2023, 10/30/2023    Pneumococcal Conjugate 13-Valent 06/01/2017    Pneumococcal Polysaccharide PPV23 01/11/2019      Health Maintenance:         Topic Date Due    Breast Cancer Screening: Mammogram  10/14/2022    Colorectal Cancer Screening  07/24/2032    Hepatitis C Screening  Completed         Topic Date Due    COVID-19 Vaccine (4 - 2023-24 season) 09/01/2023      Medicare Screening Tests and Risk Assessments:     [place] is here for her Subsequent Wellness visit. Last Medicare Wellness visit information reviewed, patient interviewed and updates made to the record today.      Health Risk Assessment:   Patient rates overall health as good. Patient feels that their physical health rating is same. Patient is very satisfied with their life. Eyesight was rated as same. Hearing was rated as same. Patient feels that their emotional and mental health rating is same. Patients states they are never, rarely angry. Patient states they are always unusually tired/fatigued. Pain experienced in the last 7 days has been a lot. Patient's pain rating has been 6/10. Patient states that she has experienced no weight loss or gain in last 6 months.     Depression " Screening:   PHQ-9 Score: 4      Fall Risk Screening:   In the past year, patient has experienced: no history of falling in past year      Urinary Incontinence Screening:   Patient has not leaked urine accidently in the last six months.     Home Safety:  Patient has trouble with stairs inside or outside of their home. Patient has working smoke alarms and has working carbon monoxide detector. Home safety hazards include: none.     Nutrition:   Current diet is Regular.     Medications:   Patient is currently taking over-the-counter supplements. OTC medications include: see medication list. Patient is able to manage medications.     Activities of Daily Living (ADLs)/Instrumental Activities of Daily Living (IADLs):   Walk and transfer into and out of bed and chair?: Yes  Dress and groom yourself?: Yes    Bathe or shower yourself?: Yes    Feed yourself? Yes  Do your laundry/housekeeping?: Yes  Manage your money, pay your bills and track your expenses?: Yes  Make your own meals?: No    Do your own shopping?: No    Previous Hospitalizations:   Any hospitalizations or ED visits within the last 12 months?: No      Advance Care Planning:   Living will: Yes    Durable POA for healthcare: Yes    Advanced directive: Yes      Comments: Her  jonatan    Cognitive Screening:   Provider or family/friend/caregiver concerned regarding cognition?: No    PREVENTIVE SCREENINGS      Cardiovascular Screening:    General: Screening Current      Diabetes Screening:     General: Screening Current      Colorectal Cancer Screening:     General: Screening Current      Cervical Cancer Screening:    General: Screening Not Indicated      Lung Cancer Screening:     General: Screening Not Indicated      Hepatitis C Screening:    General: Screening Current    Screening, Brief Intervention, and Referral to Treatment (SBIRT)      Brief Intervention  Alcohol & drug use screenings were reviewed. No concerns regarding substance use disorder identified.      Review of Current Opioid Use    Opioid Risk Tool (ORT) Interpretation: Complete Opioid Risk Tool (ORT)    Other Counseling Topics:   Car/seat belt/driving safety, skin self-exam, sunscreen and regular weightbearing exercise and calcium and vitamin D intake.     No results found.     Physical Exam:     /90 (BP Location: Left arm, Patient Position: Sitting, Cuff Size: Standard)   Pulse 88   Temp 97.8 °F (36.6 °C) (Temporal)   SpO2 98%     Physical Exam     Jemma Goldsmith DO

## 2024-02-21 PROBLEM — Z00.00 MEDICARE ANNUAL WELLNESS VISIT, SUBSEQUENT: Status: RESOLVED | Noted: 2020-09-22 | Resolved: 2024-02-21

## 2024-07-09 ENCOUNTER — TELEPHONE (OUTPATIENT)
Age: 74
End: 2024-07-09

## 2024-07-09 NOTE — TELEPHONE ENCOUNTER
Patient moved to [facility] on 7/5/2024. They are requesting TCM appointment as soon as possible. Please call [phone] to schedule. Please ask for Josseline or Corine.  She was also asking to cancel patients 8/12 appointment. Please talk to her accordingly.  They need same appointment for her partner as well. I will send the request from his chart.  Thank you!!

## 2024-07-15 ENCOUNTER — IN HOME VISIT (OUTPATIENT)
Dept: WOUND CARE | Facility: HOSPITAL | Age: 74
End: 2024-07-15
Payer: MEDICARE

## 2024-07-15 DIAGNOSIS — T14.8XXA TRAUMATIC INJURY TO SKIN OR SUBCUTANEOUS TISSUE: Primary | ICD-10-CM

## 2024-07-15 PROCEDURE — 99342 HOME/RES VST NEW LOW MDM 30: CPT | Performed by: NURSE PRACTITIONER

## 2024-07-15 NOTE — ASSESSMENT & PLAN NOTE
Left ankle  Onset unknown, seems chronic  Wound bed is covered with yellowish necrotic tissue, deepest tissue not visible, with moderate amount of serous drainage  Ordered local wound care with calcium alginate.  Alginate is a autolytic debrider, and also appropriate for moderate amount of serous drainage  Continue to offload, education provided to patient  Increase protein intake  Follow-up next week

## 2024-07-15 NOTE — PROGRESS NOTES
[facility]   [facility]       Patient ID: [place] Bunn is a 73 y.o. female Date of Birth 1950     Location of Service: [facility]    Performed wound round with: Wound team     Chief Complaint : Right outer ankle    Wound Instructions:  Wound:  Right outer ankle  Discontinue previous wound order  Cleanse the wound bed with NSS   Apply non-sting skin prep to periwound area  Apply calcium alginate to wound bed, then cover with bordered gauze   Frequency : three times a week and prn for soiling  Continue to offload  Offload all wounds  Turn and reposition frequently  Instruct / Assist with weight shifting in wheelchair  Increase protein intake.  Monitor for any sign of infection or worsening, inform PCP or patient's primary physician in your facility.      Allergies  Penicillins and Codeine      Assessment & Plan:  1. Traumatic injury to skin or subcutaneous tissue  Assessment & Plan:  Left ankle  Onset unknown, seems chronic  Wound bed is covered with yellowish necrotic tissue, deepest tissue not visible, with moderate amount of serous drainage  Ordered local wound care with calcium alginate.  Alginate is a autolytic debrider, and also appropriate for moderate amount of serous drainage  Continue to offload, education provided to patient  Increase protein intake  Follow-up next week           Subjective:   7/15/2024This is a 73 y.o., female referred to our service for wound/ skin alterations on left ankle.Patient have a complex medical history including but not limited to  has a past medical history of Cauda equina syndrome with neurogenic bladder (HCC), Depression, recurrent (HCC), Multiple sclerosis (HCC), Rheumatoid arthritis (HCC) . Patient was referred by Senior Care Team. Patient was seen in collaboration with the facility wound team.     Wound History:   Patient is a poor historian and was not able to provide enough information for the wound.  As per patient,  she acquired wound while admitted at [facility].  Patient significant others thought that it probably from wheelchair.    Received patient in bed, nonambulatory.  Wheelchair-bound.  Needs assistance with turning and repositioning in bed.  Denies pain.  Denies diabetes.  Non-smoker.            Review of Systems   Constitutional: Negative.    Respiratory: Negative.     Cardiovascular: Negative.    Musculoskeletal:  Positive for gait problem.   Skin:  Positive for wound.       Objective:    Physical Exam  Constitutional:       Appearance: Normal appearance.   Cardiovascular:      Rate and Rhythm: Normal rate.      Pulses: Normal pulses.   Pulmonary:      Effort: Pulmonary effort is normal.   Musculoskeletal:      Right lower leg: No edema.      Left lower leg: No edema.      Comments: Positive pedal pulses  With overlapping toes     Skin:     Findings: Lesion present.      Comments: Left ankle: Wound sizes 1 x 1 x 0.1 cm.,  100% slough, moderate amount of serous drainage, periwound normal, no redness, no erythema, with no obvious sign of infection, denies tenderness on palpation   Neurological:      Mental Status: She is alert and oriented to person, place, and time.   Psychiatric:         Mood and Affect: Mood normal.         Behavior: Behavior normal.              Procedures           Patient's care was coordinated with nursing facility staff. Recent vitals, labs and updated medications were reviewed on EMR or chart system of facility. Past Medical, surgical, social, medication and allergy history and patient's previous records were reviewed and updated as appropriate: Most up-to date information is available in the facility EMR where the patient is currently admitted.    Patient Active Problem List   Diagnosis    Vitamin D deficiency    Drug-induced constipation    Chronic pain    Multiple sclerosis (HCC)    Adopted    Wheelchair dependence    Neurogenic bladder    Medical marijuana use    Excessive carbohydrate  intake    Continuous opioid dependence (HCC)    Depression, recurrent (HCC)    Acquired hypothyroidism    Positive colorectal cancer screening using Cologuard test    Chronic UTI    Self-catheterizes urinary bladder    Traumatic injury to skin or subcutaneous tissue     Past Medical History:   Diagnosis Date    Acute cystitis     Anxiety     Arthritis     Cauda equina syndrome with neurogenic bladder (HCC)     Depression, recurrent (HCC) 2022    Disease of thyroid gland     Dysuria     Multiple sclerosis (HCC)     Neurogenic bladder     Nocturia     Rheumatoid arthritis (HCC)     UTI (urinary tract infection)      Past Surgical History:   Procedure Laterality Date    BREAST EXCISIONAL BIOPSY Bilateral     greater than 12 years ago    CYSTOSCOPY  2012    KNEE SURGERY       Social History     Socioeconomic History    Marital status: /Civil Union     Spouse name: None    Number of children: None    Years of education: None    Highest education level: None   Occupational History    None   Tobacco Use    Smoking status: Former     Current packs/day: 0.00     Average packs/day: 0.3 packs/day for 3.0 years (0.8 ttl pk-yrs)     Types: Cigarettes     Start date:      Quit date:      Years since quittin.5    Smokeless tobacco: Never   Vaping Use    Vaping status: Some Days    Substances: THC   Substance and Sexual Activity    Alcohol use: No    Drug use: Yes     Types: Marijuana     Comment: Vape pen for pain - rarely - medical marijuana    Sexual activity: Not Currently   Other Topics Concern    None   Social History Narrative    None     Social Determinants of Health     Financial Resource Strain: Low Risk  (3/4/2024)    [facility]    Overall Financial Resource Strain (CARDIA)     Difficulty of Paying Living Expenses: Not very hard   Recent Concern: Financial Resource Strain - Medium Risk (2024)    Overall Financial Resource Strain  (CARDIA)     Difficulty of Paying Living Expenses: Somewhat hard   Food Insecurity: No Food Insecurity (3/4/2024)    Received from [facility], [facility]    Hunger Vital Sign     Worried About Running Out of Food in the Last Year: Never true     Ran Out of Food in the Last Year: Never true   Transportation Needs: No Transportation Needs (3/4/2024)    Received from [facility], [facility]    PRAPARE - Transportation     Lack of Transportation (Medical): No     Lack of Transportation (Non-Medical): No   Physical Activity: Not on file   Stress: Not on file   Social Connections: Not on file   Intimate Partner Violence: Not At Risk (3/4/2024)    Received from [facility], [facility]    Humiliation, Afraid, Rape, and Kick questionnaire     Fear of Current or Ex-Partner: No     Emotionally Abused: No     Physically Abused: No     Sexually Abused: No   Housing Stability: Low Risk  (3/4/2024)    Received from [facility], [facility]    Housing Stability Vital Sign     Unable to Pay for Housing in the Last Year: No     Number of Places Lived in the Last Year: 1     Unstable Housing in the Last Year: No        Current Outpatient Medications:     ALPRAZolam (XANAX) 1 mg tablet, Take 1 tablet (1 mg total) by mouth 2 (two) times a day as needed for anxiety, Disp: 60 tablet, Rfl: 1    aspirin-dipyridamole (AGGRENOX)  mg per 12 hr capsule, Take 1 capsule by mouth every 12 (twelve) hours, Disp: , Rfl:     baclofen 10 mg tablet, Take 1 tablet (10 mg total) by mouth 4 (four) times a day, Disp: 360 tablet, Rfl: 1    bisacodyl (DULCOLAX) 5 mg EC tablet, Please follow prep instructions provided by the office. (Patient taking differently: Take 5 mg by mouth daily as needed for constipation), Disp: 2 tablet, Rfl: 0    buPROPion (WELLBUTRIN SR) 200 MG 12 hr tablet, Take 1 tablet by mouth 2 (two)  times a day  , Disp: , Rfl:     carBAMazepine (TEGretol XR) 200 mg 12 hr tablet, Take 200 mg by mouth 2 (two) times a day  , Disp: , Rfl:     DULoxetine (CYMBALTA) 30 mg delayed release capsule, Take 1 capsule (30 mg total) by mouth daily, Disp: 30 capsule, Rfl: 5    ergocalciferol (VITAMIN D2) 50,000 units, Take 1 capsule (50,000 Units total) by mouth once a week, Disp: 12 capsule, Rfl: 1    levothyroxine (Synthroid) 100 mcg tablet, Take with 200 mcg daily total 300 mcg, Disp: 90 tablet, Rfl: 1    levothyroxine 200 mcg tablet, TAKE 1 TABLET BY MOUTH  DAILY, Disp: , Rfl:     Linzess 290 MCG CAPS, Take 290 mcg by mouth in the morning, Disp: , Rfl:     methadone (DOLOPHINE) 10 mg tablet, Take two tabs by mouth three times daily and three tabs at bedtime.  Long term medication,, Disp: , Rfl:     methylphenidate (RITALIN) 20 MG tablet, Three tabs daily - Long term medication, Disp: , Rfl:     morphine (MS CONTIN) 60 mg 12 hr tablet, 2 tabs in am; 1 tab mid-morning; 1 tab mid afternoon; 2 tabs at bedtime.  Long term medication, Disp: , Rfl:     pantoprazole (PROTONIX) 40 mg tablet, Take 1 tablet (40 mg total) by mouth daily, Disp: 90 tablet, Rfl: 1    PARoxetine (PAXIL) 30 mg tablet, Take 1 tablet by mouth in the morning, Disp: , Rfl:     prochlorperazine (COMPAZINE) 25 mg suppository, Insert 25 mg into the rectum daily as needed for nausea or vomiting, Disp: , Rfl:     traZODone (DESYREL) 150 mg tablet, Take 1 tablet (150 mg total) by mouth daily at bedtime, Disp: 90 tablet, Rfl: 1  Family History   Adopted: Yes   Problem Relation Age of Onset    No Known Problems Mother     No Known Problems Father               Coordination of Care: Wound team aware of the treatment plan. Facility nurse will provide wound treatment and monitor the wound for any changes.     Patient / Staff education : Patient / Staff was given education on sign of infection and pressure ulcer prevention. Patient/ Staff verbalized understanding  "    Follow up :  Next week    Voice-recognition software may have been used in the preparation of this document. Occasional wrong word or \"sound-alike\" substitutions may have occurred due to the inherent limitations of voice recognition software. Interpretation should be guided by context.      JASON Castaneda  "

## 2024-07-16 ENCOUNTER — TELEPHONE (OUTPATIENT)
Age: 74
End: 2024-07-16

## 2024-07-16 DIAGNOSIS — G35 MULTIPLE SCLEROSIS (HCC): Primary | ICD-10-CM

## 2024-07-16 DIAGNOSIS — Z99.3 WHEELCHAIR DEPENDENCE: ICD-10-CM

## 2024-07-16 NOTE — TELEPHONE ENCOUNTER
Maty Lamas requesting referrals for Physical Therapy and Occupational Therapy for balance to function, muscle weakness and MS.

## 2024-07-16 NOTE — TELEPHONE ENCOUNTER
Tried calling [facility] to see where the referrals need to be sent because there was no information list on where to send it once finished but there was no answer. Called [phone]

## 2024-07-17 ENCOUNTER — DOCUMENTATION (OUTPATIENT)
Dept: WOUND CARE | Facility: HOSPITAL | Age: 74
End: 2024-07-17

## 2024-07-17 DIAGNOSIS — R68.89 SUSPECTED DEEP TISSUE INJURY OF UNKNOWN DEPTH: Primary | ICD-10-CM

## 2024-07-18 ENCOUNTER — RA CDI HCC (OUTPATIENT)
Dept: OTHER | Facility: HOSPITAL | Age: 74
End: 2024-07-18

## 2024-07-22 ENCOUNTER — IN HOME VISIT (OUTPATIENT)
Dept: WOUND CARE | Facility: HOSPITAL | Age: 74
End: 2024-07-22
Payer: MEDICARE

## 2024-07-22 DIAGNOSIS — T14.8XXA TRAUMATIC INJURY TO SKIN OR SUBCUTANEOUS TISSUE: Primary | ICD-10-CM

## 2024-07-22 PROCEDURE — 99348 HOME/RES VST EST LOW MDM 30: CPT | Performed by: NURSE PRACTITIONER

## 2024-07-22 NOTE — PROGRESS NOTES
[facility] WOUND CARE MANAGEMENT   AND HYPERBARIC MEDICINE CENTER       Patient ID: Florida Bunn is a 73 y.o. female Date of Birth 1950     [facility] of Service: [facility]    Performed wound round with: Wound team     Chief Complaint : Left  outer ankle    Wound Instructions:  Wound:  Left outer ankle  Discontinue previous wound order  Cleanse the wound bed with NSS   Apply non-sting skin prep to periwound area  Apply calcium alginate to wound bed, then cover with bordered gauze   Frequency : three times a week and prn for soiling  Continue to offload  Offload all wounds  Turn and reposition frequently  Instruct / Assist with weight shifting in wheelchair  Increase protein intake.  Monitor for any sign of infection or worsening, inform PCP or patient's primary physician in your facility.      Allergies  Penicillins and Codeine      Assessment & Plan:  1. Traumatic injury to skin or subcutaneous tissue  Assessment & Plan:  Left ankle  Onset unknown, seems chronic  Wound improved, increase granulation  Ordered local wound care with calcium alginate.  Alginate is a autolytic debrider, and also appropriate for moderate amount of serous drainage  Continue to offload  As per report, patient had been removing her wound dressing  Increase protein intake  Follow-up next week             Subjective:   7/15/2024This is a 73 y.o., female referred to our service for wound/ skin alterations on left ankle.Patient have a complex medical history including but not limited to  has a past medical history of Cauda equina syndrome with neurogenic bladder (HCC), Depression, recurrent (HCC), Multiple sclerosis (HCC), Rheumatoid arthritis (HCC) . Patient was referred by Senior Care Team. Patient was seen in collaboration with the facility wound team.     Wound History:   Patient is a poor historian and was not able to provide enough information for the wound.  As per patient, she acquired wound while admitted at [facility].   Patient significant others thought that it probably from wheelchair.    Received patient in bed, nonambulatory.  Wheelchair-bound.  Needs assistance with turning and repositioning in bed.  Denies pain.  Denies diabetes.  Non-smoker.    7/22/2024 Follow up for wound on the left ankle . Received patient, not in distress. Facility staff did not report any significant issues related to the wound. Patient was asleep during visit, as per significant others, patient did not get any sleep due to frequent going to the bathroom.               Review of Systems   Reason unable to perform ROS: asleep.       Objective:    Physical Exam  Constitutional:       Appearance: Normal appearance.   Cardiovascular:      Rate and Rhythm: Normal rate.   Pulmonary:      Effort: Pulmonary effort is normal.   Musculoskeletal:      Right lower leg: No edema.      Left lower leg: No edema.      Comments: Positive pedal pulses  With overlapping toes     Skin:     Findings: Lesion present.      Comments: Left ankle: Wound sizes 1 x 1 x 0.1 cm.,  100% granulation, moderate amount of serous drainage, periwound normal, no redness, no erythema, with no obvious sign of infection, denies tenderness on palpation   Neurological:      Mental Status: She is alert.              Procedures           Patient's care was coordinated with nursing facility staff. Recent vitals, labs and updated medications were reviewed on EMR or chart system of facility. Past Medical, surgical, social, medication and allergy history and patient's previous records were reviewed and updated as appropriate: Most up-to date information is available in the facility EMR where the patient is currently admitted.    Patient Active Problem List   Diagnosis    Vitamin D deficiency    Drug-induced constipation    Chronic pain    Multiple sclerosis (HCC)    Adopted    Wheelchair dependence    Neurogenic bladder    Medical marijuana use    Excessive carbohydrate intake    Continuous opioid  dependence (HCC)    Depression, recurrent (HCC)    Acquired hypothyroidism    Positive colorectal cancer screening using Cologuard test    Chronic UTI    Self-catheterizes urinary bladder    Traumatic injury to skin or subcutaneous tissue     Past Medical History:   Diagnosis Date    Acute cystitis     Anxiety     Arthritis     Cauda equina syndrome with neurogenic bladder (HCC)     Depression, recurrent (HCC) 2022    Disease of thyroid gland     Dysuria     Multiple sclerosis (HCC)     Neurogenic bladder     Nocturia     Rheumatoid arthritis (HCC)     UTI (urinary tract infection)      Past Surgical History:   Procedure Laterality Date    BREAST EXCISIONAL BIOPSY Bilateral     greater than 12 years ago    CYSTOSCOPY  2012    KNEE SURGERY       Social History     Socioeconomic History    Marital status: /Civil Union     Spouse name: None    Number of children: None    Years of education: None    Highest education level: None   Occupational History    None   Tobacco Use    Smoking status: Former     Current packs/day: 0.00     Average packs/day: 0.3 packs/day for 3.0 years (0.8 ttl pk-yrs)     Types: Cigarettes     Start date:      Quit date:      Years since quittin.5    Smokeless tobacco: Never   Vaping Use    Vaping status: Some Days    Substances: THC   Substance and Sexual Activity    Alcohol use: No    Drug use: Yes     Types: Marijuana     Comment: Vape pen for pain - rarely - medical marijuana    Sexual activity: Not Currently   Other Topics Concern    None   Social History Narrative    None     Social Determinants of Health     Financial Resource Strain: Low Risk  (3/4/2024)    [facility]    Overall Financial Resource Strain (CARDIA)     Difficulty of Paying Living Expenses: Not very hard   Recent Concern: Financial Resource Strain - Medium Risk (2024)    Overall Financial Resource Strain (CARDIA)     Difficulty of  Paying Living Expenses: Somewhat hard   Food Insecurity: No Food Insecurity (3/4/2024)    [facility]    Hunger Vital Sign     Worried About Running Out of Food in the Last Year: Never true     Ran Out of Food in the Last Year: Never true   Transportation Needs: No Transportation Needs (3/4/2024)    [facility]    PRAPARE - Transportation     Lack of Transportation (Medical): No     Lack of Transportation (Non-Medical): No   Physical Activity: Not on file   Stress: Not on file   Social Connections: Not on file   Intimate Partner Violence: Not At Risk (3/4/2024)    [facility]    Humiliation, Afraid, Rape, and Kick questionnaire     Fear of Current or Ex-Partner: No     Emotionally Abused: No     Physically Abused: No     Sexually Abused: No   Housing Stability: Low Risk  (3/4/2024)    [facility]    Housing Stability Vital Sign     Unable to Pay for Housing in the Last Year: No     Number of Places Lived in the Last Year: 1     Unstable Housing in the Last Year: No        Current Outpatient Medications:     ALPRAZolam (XANAX) 1 mg tablet, Take 1 tablet (1 mg total) by mouth 2 (two) times a day as needed for anxiety, Disp: 60 tablet, Rfl: 1    aspirin-dipyridamole (AGGRENOX)  mg per 12 hr capsule, Take 1 capsule by mouth every 12 (twelve) hours, Disp: , Rfl:     baclofen 10 mg tablet, Take 1 tablet (10 mg total) by mouth 4 (four) times a day, Disp: 360 tablet, Rfl: 1    bisacodyl (DULCOLAX) 5 mg EC tablet, Please follow prep instructions provided by the office. (Patient taking differently: Take 5 mg by mouth daily as needed for constipation), Disp: 2 tablet, Rfl: 0    buPROPion (WELLBUTRIN SR) 200 MG 12 hr tablet, Take 1 tablet by mouth 2 (two) times a day  , Disp: , Rfl:      carBAMazepine (TEGretol XR) 200 mg 12 hr tablet, Take 200 mg by mouth 2 (two) times a day  , Disp: , Rfl:     DULoxetine (CYMBALTA) 30 mg delayed release capsule, Take 1 capsule (30 mg total) by mouth daily, Disp: 30 capsule, Rfl: 5    ergocalciferol (VITAMIN D2) 50,000 units, Take 1 capsule (50,000 Units total) by mouth once a week, Disp: 12 capsule, Rfl: 1    levothyroxine (Synthroid) 100 mcg tablet, Take with 200 mcg daily total 300 mcg, Disp: 90 tablet, Rfl: 1    levothyroxine 200 mcg tablet, TAKE 1 TABLET BY MOUTH  DAILY, Disp: , Rfl:     Linzess 290 MCG CAPS, Take 290 mcg by mouth in the morning, Disp: , Rfl:     methadone (DOLOPHINE) 10 mg tablet, Take two tabs by mouth three times daily and three tabs at bedtime.  Long term medication,, Disp: , Rfl:     methylphenidate (RITALIN) 20 MG tablet, Three tabs daily - Long term medication, Disp: , Rfl:     morphine (MS CONTIN) 60 mg 12 hr tablet, 2 tabs in am; 1 tab mid-morning; 1 tab mid afternoon; 2 tabs at bedtime.  Long term medication, Disp: , Rfl:     pantoprazole (PROTONIX) 40 mg tablet, Take 1 tablet (40 mg total) by mouth daily, Disp: 90 tablet, Rfl: 1    PARoxetine (PAXIL) 30 mg tablet, Take 1 tablet by mouth in the morning, Disp: , Rfl:     prochlorperazine (COMPAZINE) 25 mg suppository, Insert 25 mg into the rectum daily as needed for nausea or vomiting, Disp: , Rfl:     traZODone (DESYREL) 150 mg tablet, Take 1 tablet (150 mg total) by mouth daily at bedtime, Disp: 90 tablet, Rfl: 1  Family History   Adopted: Yes   Problem Relation Age of Onset    No Known Problems Mother     No Known Problems Father               Coordination of Care: Wound team aware of the treatment plan. Facility nurse will provide wound treatment and monitor the wound for any changes.     Patient / Staff education : Patient / Staff was given education on sign of infection and pressure ulcer prevention. Patient/ Staff verbalized understanding     Follow up :  Next  "week    Voice-recognition software may have been used in the preparation of this document. Occasional wrong word or \"sound-alike\" substitutions may have occurred due to the inherent limitations of voice recognition software. Interpretation should be guided by context.      JASON Castaneda  "

## 2024-07-22 NOTE — LETTER
Patient:  Florida Bunn   1950           JASON Castaneda saw Florida Bunn for a wound care visit on 7/22/2024. See below for information relating to this visit.      Chief Complaint   Patient presents with    Follow Up Wound Care Visit        Assessment/Plan:  1. Traumatic injury to skin or subcutaneous tissue  Assessment & Plan:  Left ankle  Onset unknown, seems chronic  Wound improved, increase granulation  Ordered local wound care with calcium alginate.  Alginate is a autolytic debrider, and also appropriate for moderate amount of serous drainage  Continue to offload  As per report, patient had been removing her wound dressing  Increase protein intake  Follow-up next week         Orders:  Florida Oliverbalbir  1950    Wound:  Left outer ankle  Discontinue previous wound order  Cleanse the wound bed with NSS   Apply non-sting skin prep to periwound area  Apply calcium alginate to wound bed, then cover with bordered gauze   Frequency : three times a week and prn for soiling  Continue to offload  Offload all wounds  Turn and reposition frequently  Instruct / Assist with weight shifting in wheelchair  Increase protein intake.  Monitor for any sign of infection or worsening, inform PCP or patient's primary physician in your facility.    Follow Up:  Return in about 1 week (around 7/29/2024).       [facility] hours are 8:00 am - 4:30 pm Monday through Friday. The center phone number is [phone]. You can also contact me directly thru my email at [email or web address] or thru tiger text. If it is an emergency, please contact the PCP or patient's attending physician in your facility.     Sincerely,    Electronically signed by JASON Castaneda    Patient : Florida Bunn    1950

## 2024-07-22 NOTE — ASSESSMENT & PLAN NOTE
Left ankle  Onset unknown, seems chronic  Wound improved, increase granulation  Ordered local wound care with calcium alginate.  Alginate is a autolytic debrider, and also appropriate for moderate amount of serous drainage  Continue to offload  As per report, patient had been removing her wound dressing  Increase protein intake  Follow-up next week

## 2024-07-24 NOTE — TELEPHONE ENCOUNTER
Reason for call:   [x] Refill   [] Prior Auth  [] Other:     Office:   [x] PCP/Provider -   [] Specialty/Provider -     Medication: methadone (DOLOPHINE) 10 mg 3 tablets 3 times daily       Pharmacy: [facility]     Does the patient have enough for 3 days?   [] Yes   [x] No - Send as HP to POD

## 2024-07-25 ENCOUNTER — TELEPHONE (OUTPATIENT)
Age: 74
End: 2024-07-25

## 2024-07-25 RX ORDER — METHADONE HYDROCHLORIDE 10 MG/1
30 TABLET ORAL 3 TIMES DAILY
Refills: 0 | OUTPATIENT
Start: 2024-07-25

## 2024-07-25 NOTE — TELEPHONE ENCOUNTER
Madan from [facility] calling to tell us that Pt is switching to an inhouse doctor.  Madan just wanted to let us know that and she cancelled appt for today.

## 2024-08-04 ENCOUNTER — HOSPITAL ENCOUNTER (EMERGENCY)
Facility: HOSPITAL | Age: 74
Discharge: HOME | End: 2024-08-04
Attending: EMERGENCY MEDICINE
Payer: MEDICARE

## 2024-08-04 ENCOUNTER — APPOINTMENT (EMERGENCY)
Dept: CT IMAGING | Facility: HOSPITAL | Age: 74
End: 2024-08-04
Payer: MEDICARE

## 2024-08-04 VITALS
WEIGHT: 146.39 LBS | SYSTOLIC BLOOD PRESSURE: 128 MMHG | BODY MASS INDEX: 20.42 KG/M2 | HEART RATE: 64 BPM | DIASTOLIC BLOOD PRESSURE: 63 MMHG | TEMPERATURE: 99.4 F | OXYGEN SATURATION: 94 % | RESPIRATION RATE: 18 BRPM

## 2024-08-04 DIAGNOSIS — M54.2 NECK PAIN: ICD-10-CM

## 2024-08-04 DIAGNOSIS — L89.211 PRESSURE INJURY OF RIGHT THIGH, STAGE 1: ICD-10-CM

## 2024-08-04 DIAGNOSIS — R51.9 HEADACHE: Primary | ICD-10-CM

## 2024-08-04 DIAGNOSIS — M25.519 SHOULDER PAIN: ICD-10-CM

## 2024-08-04 LAB
ALBUMIN SERPL BCG-MCNC: 4 G/DL (ref 3.5–5)
ALP SERPL-CCNC: 79 U/L (ref 34–104)
ALT SERPL W P-5'-P-CCNC: 7 U/L (ref 7–52)
ANION GAP SERPL CALCULATED.3IONS-SCNC: 6 MMOL/L (ref 4–13)
AST SERPL W P-5'-P-CCNC: 11 U/L (ref 13–39)
BASE EX.OXY STD BLDV CALC-SCNC: 53.1 % (ref 60–80)
BASE EXCESS BLDV CALC-SCNC: 2.1 MMOL/L
BASOPHILS # BLD AUTO: 0.03 THOUSANDS/ÂΜL (ref 0–0.1)
BASOPHILS NFR BLD AUTO: 1 % (ref 0–1)
BILIRUB SERPL-MCNC: 0.33 MG/DL (ref 0.2–1)
BUN SERPL-MCNC: 15 MG/DL (ref 5–25)
CALCIUM SERPL-MCNC: 10.1 MG/DL (ref 8.4–10.2)
CHLORIDE SERPL-SCNC: 99 MMOL/L (ref 96–108)
CO2 SERPL-SCNC: 31 MMOL/L (ref 21–32)
CREAT SERPL-MCNC: 0.96 MG/DL (ref 0.6–1.3)
EOSINOPHIL # BLD AUTO: 0.03 THOUSAND/ÂΜL (ref 0–0.61)
EOSINOPHIL NFR BLD AUTO: 1 % (ref 0–6)
ERYTHROCYTE [DISTWIDTH] IN BLOOD BY AUTOMATED COUNT: 14.6 % (ref 11.6–15.1)
GFR SERPL CREATININE-BSD FRML MDRD: 58 ML/MIN/1.73SQ M
GLUCOSE SERPL-MCNC: 80 MG/DL (ref 65–140)
HCO3 BLDV-SCNC: 29.4 MMOL/L (ref 24–30)
HCT VFR BLD AUTO: 36.4 % (ref 34.8–46.1)
HGB BLD-MCNC: 11.4 G/DL (ref 11.5–15.4)
IMM GRANULOCYTES # BLD AUTO: 0.01 THOUSAND/UL (ref 0–0.2)
IMM GRANULOCYTES NFR BLD AUTO: 0 % (ref 0–2)
LYMPHOCYTES # BLD AUTO: 1.67 THOUSANDS/ÂΜL (ref 0.6–4.47)
LYMPHOCYTES NFR BLD AUTO: 31 % (ref 14–44)
MCH RBC QN AUTO: 30.5 PG (ref 26.8–34.3)
MCHC RBC AUTO-ENTMCNC: 31.3 G/DL (ref 31.4–37.4)
MCV RBC AUTO: 97 FL (ref 82–98)
MONOCYTES # BLD AUTO: 0.51 THOUSAND/ÂΜL (ref 0.17–1.22)
MONOCYTES NFR BLD AUTO: 10 % (ref 4–12)
NEUTROPHILS # BLD AUTO: 3.12 THOUSANDS/ÂΜL (ref 1.85–7.62)
NEUTS SEG NFR BLD AUTO: 57 % (ref 43–75)
NRBC BLD AUTO-RTO: 0 /100 WBCS
O2 CT BLDV-SCNC: 9.2 ML/DL
PCO2 BLDV: 58.5 MM HG (ref 42–50)
PH BLDV: 7.32 [PH] (ref 7.3–7.4)
PLATELET # BLD AUTO: 222 THOUSANDS/UL (ref 149–390)
PMV BLD AUTO: 9.3 FL (ref 8.9–12.7)
PO2 BLDV: 30.1 MM HG (ref 35–45)
POTASSIUM SERPL-SCNC: 4.1 MMOL/L (ref 3.5–5.3)
PROT SERPL-MCNC: 7.2 G/DL (ref 6.4–8.4)
RBC # BLD AUTO: 3.74 MILLION/UL (ref 3.81–5.12)
SODIUM SERPL-SCNC: 136 MMOL/L (ref 135–147)
WBC # BLD AUTO: 5.37 THOUSAND/UL (ref 4.31–10.16)

## 2024-08-04 PROCEDURE — 99284 EMERGENCY DEPT VISIT MOD MDM: CPT

## 2024-08-04 PROCEDURE — 72125 CT NECK SPINE W/O DYE: CPT

## 2024-08-04 PROCEDURE — 70450 CT HEAD/BRAIN W/O DYE: CPT

## 2024-08-04 PROCEDURE — 36415 COLL VENOUS BLD VENIPUNCTURE: CPT | Performed by: EMERGENCY MEDICINE

## 2024-08-04 PROCEDURE — 80053 COMPREHEN METABOLIC PANEL: CPT | Performed by: EMERGENCY MEDICINE

## 2024-08-04 PROCEDURE — 85025 COMPLETE CBC W/AUTO DIFF WBC: CPT | Performed by: EMERGENCY MEDICINE

## 2024-08-04 PROCEDURE — 99285 EMERGENCY DEPT VISIT HI MDM: CPT | Performed by: EMERGENCY MEDICINE

## 2024-08-04 PROCEDURE — 74177 CT ABD & PELVIS W/CONTRAST: CPT

## 2024-08-04 PROCEDURE — 71260 CT THORAX DX C+: CPT

## 2024-08-04 PROCEDURE — 82805 BLOOD GASES W/O2 SATURATION: CPT | Performed by: EMERGENCY MEDICINE

## 2024-08-04 RX ADMIN — IOHEXOL 100 ML: 350 INJECTION, SOLUTION INTRAVENOUS at 03:19

## 2024-08-04 NOTE — ED PROVIDER NOTES
"History  Chief Complaint   Patient presents with    Medical Problem     Pt arriving via ems from [facility] c/o R shoulder and neck pain with a headache after a fall a week ago. Per ems facility gave tylenol, morphine, and oxy with no relief. Pt hx dementia. Pt denies head strike or LOC when she had fall, states \"I fell out of bed on my shoulder\"     Patient presents from a local nursing facility for headache, right neck pain and right shoulder pain.  Patient has a history of chronic pain disorders and is on multiple different medications to help control pain.  EMS was called tonight due to worsening and uncontrolled pain in these areas.  It was reported that the patient did suffer a fall about a week ago.  Unclear on the events of that.  Per medical records the patient is wheelchair-bound.  Patient was hypoxic on EMS arrival and is on multiple different pain medications.      History provided by:  Medical records and patient  History limited by:  Dementia   used: No    Medical Problem      Prior to Admission Medications   Prescriptions Last Dose Informant Patient Reported? Taking?   ALPRAZolam (XANAX) 1 mg tablet  Spouse/Significant Other, Self No No   Sig: Take 1 tablet (1 mg total) by mouth 2 (two) times a day as needed for anxiety   DULoxetine (CYMBALTA) 30 mg delayed release capsule   No No   Sig: Take 1 capsule (30 mg total) by mouth daily   Linzess 290 MCG CAPS  Self, Spouse/Significant Other Yes No   Sig: Take 290 mcg by mouth in the morning   PARoxetine (PAXIL) 30 mg tablet  Self, Spouse/Significant Other Yes No   Sig: Take 1 tablet by mouth in the morning   aspirin-dipyridamole (AGGRENOX)  mg per 12 hr capsule  Self, Spouse/Significant Other Yes No   Sig: Take 1 capsule by mouth every 12 (twelve) hours   baclofen 10 mg tablet   No No   Sig: Take 1 tablet (10 mg total) by mouth 4 (four) times a day   bisacodyl (DULCOLAX) 5 mg EC tablet  Self, Spouse/Significant Other No No "   Sig: Please follow prep instructions provided by the office.   Patient taking differently: Take 5 mg by mouth daily as needed for constipation   buPROPion (WELLBUTRIN SR) 200 MG 12 hr tablet  Self, Spouse/Significant Other Yes No   Sig: Take 1 tablet by mouth 2 (two) times a day     carBAMazepine (TEGretol XR) 200 mg 12 hr tablet  Self, Spouse/Significant Other Yes No   Sig: Take 200 mg by mouth 2 (two) times a day     ergocalciferol (VITAMIN D2) 50,000 units  Spouse/Significant Other, Self No No   Sig: Take 1 capsule (50,000 Units total) by mouth once a week   levothyroxine (Synthroid) 100 mcg tablet   No No   Sig: Take with 200 mcg daily total 300 mcg   levothyroxine 200 mcg tablet  Self, Spouse/Significant Other Yes No   Sig: TAKE 1 TABLET BY MOUTH  DAILY   methadone (DOLOPHINE) 10 mg tablet  Self, Spouse/Significant Other Yes No   Sig: Take two tabs by mouth three times daily and three tabs at bedtime.  Long term medication,   methylphenidate (RITALIN) 20 MG tablet  Self, Spouse/Significant Other Yes No   Sig: Three tabs daily - Long term medication   morphine (MS CONTIN) 60 mg 12 hr tablet  Self, Spouse/Significant Other Yes No   Si tabs in am; 1 tab mid-morning; 1 tab mid afternoon; 2 tabs at bedtime.  Long term medication   pantoprazole (PROTONIX) 40 mg tablet   No No   Sig: Take 1 tablet (40 mg total) by mouth daily   prochlorperazine (COMPAZINE) 25 mg suppository  Self, Spouse/Significant Other Yes No   Sig: Insert 25 mg into the rectum daily as needed for nausea or vomiting   traZODone (DESYREL) 150 mg tablet  Self, Spouse/Significant Other No No   Sig: Take 1 tablet (150 mg total) by mouth daily at bedtime      Facility-Administered Medications: None       Past Medical History:   Diagnosis Date    Acute cystitis     Anxiety     Arthritis     Cauda equina syndrome with neurogenic bladder (HCC)     Depression, recurrent (HCC) 2022    Disease of thyroid gland     Dysuria     Multiple sclerosis  (HCC)     Neurogenic bladder     Nocturia     Rheumatoid arthritis (HCC)     UTI (urinary tract infection)        Past Surgical History:   Procedure Laterality Date    BREAST EXCISIONAL BIOPSY Bilateral     greater than 12 years ago    CYSTOSCOPY  2012    KNEE SURGERY         Family History   Adopted: Yes   Problem Relation Age of Onset    No Known Problems Mother     No Known Problems Father      I have reviewed and agree with the history as documented.    E-Cigarette/Vaping    E-Cigarette Use Current Some Day User     Comments medical marijuana      E-Cigarette/Vaping Substances    Nicotine No     THC Yes     CBD No     Flavoring No     Other No      Social History     Tobacco Use    Smoking status: Former     Current packs/day: 0.00     Average packs/day: 0.3 packs/day for 3.0 years (0.8 ttl pk-yrs)     Types: Cigarettes     Start date:      Quit date:      Years since quittin.6    Smokeless tobacco: Never   Vaping Use    Vaping status: Some Days    Substances: THC   Substance Use Topics    Alcohol use: No    Drug use: Yes     Types: Marijuana     Comment: Vape pen for pain - rarely - medical marijuana       Review of Systems   Unable to perform ROS: Dementia       Physical Exam  Physical Exam  Vitals and nursing note reviewed.   Constitutional:       General: She is not in acute distress.     Appearance: She is well-developed. She is not ill-appearing, toxic-appearing or diaphoretic.      Comments: Falls asleep during exam   HENT:      Head: Normocephalic and atraumatic.      Right Ear: External ear normal.      Left Ear: External ear normal.      Nose: Nose normal. No congestion or rhinorrhea.   Eyes:      General: No scleral icterus.        Right eye: No discharge.         Left eye: No discharge.      Conjunctiva/sclera: Conjunctivae normal.   Neck:      Trachea: No tracheal deviation.   Cardiovascular:      Rate and Rhythm: Normal rate.   Pulmonary:      Effort: Pulmonary effort is normal. No  tachypnea, accessory muscle usage or respiratory distress.      Breath sounds: No stridor.   Abdominal:      General: There is no distension.   Musculoskeletal:         General: Tenderness present.      Right lower leg: No edema.      Left lower leg: No edema.   Skin:     General: Skin is warm and dry.   Neurological:      Mental Status: She is easily aroused. She is lethargic, disoriented and confused.      GCS: GCS eye subscore is 3. GCS verbal subscore is 4. GCS motor subscore is 6.      Gait: Gait normal.         Vital Signs  ED Triage Vitals [08/04/24 0105]   Temperature Pulse Respirations Blood Pressure SpO2   99.4 °F (37.4 °C) 78 18 (!) 175/81 92 %      Temp Source Heart Rate Source Patient Position - Orthostatic VS BP Location FiO2 (%)   Oral Monitor Sitting Right arm --      Pain Score       8           Vitals:    08/04/24 0105 08/04/24 0345   BP: (!) 175/81 134/58   Pulse: 78 74   Patient Position - Orthostatic VS: Sitting Lying         Visual Acuity      ED Medications  Medications   iohexol (OMNIPAQUE) 350 MG/ML injection (MULTI-DOSE) 100 mL (100 mL Intravenous Given 8/4/24 0319)       Diagnostic Studies  Results Reviewed       Procedure Component Value Units Date/Time    Comprehensive metabolic panel [[id number]]  (Abnormal) Collected: 08/04/24 0138    Lab Status: Final result Specimen: Blood from Arm, Left Updated: 08/04/24 0201     Sodium 136 mmol/L      Potassium 4.1 mmol/L      Chloride 99 mmol/L      CO2 31 mmol/L      ANION GAP 6 mmol/L      BUN 15 mg/dL      Creatinine 0.96 mg/dL      Glucose 80 mg/dL      Calcium 10.1 mg/dL      AST 11 U/L      ALT 7 U/L      Alkaline Phosphatase 79 U/L      Total Protein 7.2 g/dL      Albumin 4.0 g/dL      Total Bilirubin 0.33 mg/dL      eGFR 58 ml/min/1.73sq m     Narrative:      National Kid[employer] Foundation guidelines for Chronic Kidney Disease (CKD):     Stage 1 with normal or high GFR (GFR > 90 mL/min/1.73 square meters)    Stage 2 Mild CKD (GFR =  60-89 mL/min/1.73 square meters)    Stage 3A Moderate CKD (GFR = 45-59 mL/min/1.73 square meters)    Stage 3B Moderate CKD (GFR = 30-44 mL/min/1.73 square meters)    Stage 4 Severe CKD (GFR = 15-29 mL/min/1.73 square meters)    Stage 5 End Stage CKD (GFR <15 mL/min/1.73 square meters)  Note: GFR calculation is accurate only with a steady state creatinine    CBC and differential [[id number]]  (Abnormal) Collected: 08/04/24 0138    Lab Status: Final result Specimen: Blood from Arm, Left Updated: 08/04/24 0144     WBC 5.37 Thousand/uL      RBC 3.74 Million/uL      Hemoglobin 11.4 g/dL      Hematocrit 36.4 %      MCV 97 fL      MCH 30.5 pg      MCHC 31.3 g/dL      RDW 14.6 %      MPV 9.3 fL      Platelets 222 Thousands/uL      nRBC 0 /100 WBCs      Segmented % 57 %      Immature Grans % 0 %      Lymphocytes % 31 %      Monocytes % 10 %      Eosinophils Relative 1 %      Basophils Relative 1 %      Absolute Neutrophils 3.12 Thousands/µL      Absolute Immature Grans 0.01 Thousand/uL      Absolute Lymphocytes 1.67 Thousands/µL      Absolute Monocytes 0.51 Thousand/µL      Eosinophils Absolute 0.03 Thousand/µL      Basophils Absolute 0.03 Thousands/µL     Blood gas, venous [[id number]]  (Abnormal) Collected: 08/04/24 0138    Lab Status: Final result Specimen: Blood from Arm, Left Updated: 08/04/24 0144     pH, Jayro 7.319     pCO2, Jayro 58.5 mm Hg      pO2, Jayro 30.1 mm Hg      HCO3, Jayro 29.4 mmol/L      Base Excess, Jayro 2.1 mmol/L      O2 Content, Jayro 9.2 ml/dL      O2 HGB, VENOUS 53.1 %                    CT head without contrast   Final Result by Bhupendra Martinez DO (08/04 0426)      Bilateral mastoid/middle ear effusions, consider otomastoiditis in the appropriate clinical setting. Calvarium appears intact. No acute intracranial process seen otherwise.      Other findings as above.            CT CERVICAL SPINE - WITHOUT CONTRAST      INDICATION:   trauma. Fall      COMPARISON:  None.      TECHNIQUE:  CT examination  of the cervical spine was performed without intravenous contrast.  Contiguous axial images were obtained. Multiplanar 2D reformatted images were created from the source data.      Radiation dose length product (DLP) for this visit:  891.16 mGy-cm  (accession [id number]), 328.65 mGy-cm  (accession [id number]), 577.22 mGy-cm  (accession [id number]), 0 mGy-cm  (accession [id number]).  This examination, like all CT scans performed in the [facility], was performed utilizing techniques to minimize radiation dose exposure, including the use of iterative reconstruction and automated exposure control.      IMAGE QUALITY:  Diagnostic.      FINDINGS:      ALIGNMENT: Approximately 3 mm of anterolisthesis of C3 on C4, 2 to 3 mm of anterolisthesis of C4 on C5 and C7 on T1 probably related to facet joint arthropathy at these levels. Spinal alignment otherwise grossly maintained.      VERTEBRAE: No acute fracture.      DEGENERATIVE CHANGES: Degenerative changes of the articular pillar of C1 on the right and C2. Sclerosis and subchondral cyst formation in the dens. Intervertebral disc space narrowing at C5/C6 and C6/C7 with anterior, marginal, and uncovertebral    osteophytosis at these levels. Multilevel facet joint arthropathy.      PREVERTEBRAL AND PARASPINAL SOFT TISSUES: Hypoplastic thyroid gland, nonemergent correlation with patient's thyroid function tests recommended. Otherwise grossly unremarkable.      THORACIC INLET:  Please refer to the concurrent chest CT report for thoracic inlet findings.         IMPRESSION:      Degenerative changes as described but no evidence of acute cervical spine injury.      Hypoplastic thyroid gland, nonemergent correlation with patient's thyroid function tests recommended.            CT CHEST, ABDOMEN AND PELVIS WITH IV CONTRAST   CT THORACOLUMBAR SPINE WITHOUT CONTRAST      INDICATION: trauma. Fall      COMPARISON: None.      TECHNIQUE: CT examination of the chest, abdomen and  pelvis was performed. Multiplanar 2D reformatted images were created from the source data.      This examination, like all CT scans performed in the [facility], was performed utilizing techniques to minimize radiation dose exposure, including the use of iterative reconstruction and automated exposure control. Radiation dose length    product (DLP) for this visit: 891.16 mGy-cm  (accession [id number]), 328.65 mGy-cm  (accession [id number]), 577.22 mGy-cm  (accession [id number]), 0 mGy-cm  (accession [id number])      IV Contrast: 100 mL of iohexol (OMNIPAQUE)   Enteric Contrast: Not administered.      FINDINGS:      CHEST      LUNGS: Atelectasis noted dependently in the bilateral lower lung fields.      PLEURA: Mild right apical pleural scarring      HEART/GREAT VESSELS: Heart is enlarged. Mild aortic atherosclerosis. Mild coronary atherosclerosis. No thoracic aortic aneurysm.      MEDIASTINUM AND ALBER: Moderate-sized hiatal hernia.      CHEST WALL AND LOWER NECK: Hypoplastic thyroid gland. Degenerative changes of the bilateral shoulders.         ABDOMEN      LIVER/BILIARY TREE: Moderate intra and extrahepatic biliary ductal dilatation. Otherwise grossly unremarkable.      GALLBLADDER: Mildly distended. No calcified gallstones. No pericholecystic inflammatory change.      SPLEEN: Unremarkable.      PANCREAS: Pancreatic atrophy. There is dilatation of the pancreatic duct.      ADRENAL GLANDS: Unremarkable.      KIDNEYS/URETERS: Simple renal cyst(s). Otherwise unremarkable kidneys. No hydronephrosis.      STOMACH AND BOWEL: No evidence of bowel obstruction.      APPENDIX: Not well seen      ABDOMINOPELVIC CAVITY: No ascites. No pneumoperitoneum. No lymphadenopathy.      VESSELS: Mild atherosclerosis      PELVIS      REPRODUCTIVE ORGANS: Grossly unremarkable      URINARY BLADDER: Unremarkable.      ABDOMINAL WALL/INGUINAL REGIONS: Body wall edema. Focal area of fat stranding in the posterior proximal right thigh  and infra gluteal region (axial image 249, series 2, for example), question developing decubitus ulcer.      BONES: Old fracture of the right hip with 2 metallic fixation screws. Hardware appears intact. Severe degenerative changes of the right hip. Generalized osteopenia.      S-shaped curvature of the thoracolumbar spine, lower lumbar curve apex to the left. Vertebral hemangiomas in the L5 and T6 vertebral bodies.      Multilevel degenerative changes with intervertebral disc space narrowing, vacuum disc phenomenon, and facet joint arthropathy, more prominent in the lower thoracic and in the lumbar spine.         IMPRESSION:      No discrete evidence of acute visceral or vascular injury in the chest, abdomen, or pelvis      Biliary ductal dilatation as well as dilatation of the main pancreatic duct. Nonspecific but raises the suspicion for stricture and/or mass/lesion in the region of the ampulla. Correlation with the patient's symptoms and laboratory values recommended.    Nonemergent MR/MRCP may be of benefit for further evaluation at the discretion of the referring caregiver.      Focal area of fat stranding in the posterior proximal right thigh and infra gluteal region (axial image 249, series 2, for example), question posttraumatic injury versus developing decubitus ulcer.      S-shaped curvature of the thoracolumbar spine with multilevel degenerative changes and generalized osteopenia. No acute fracture is seen.      Cardiomegaly, coronary atherosclerosis, hiatal hernia, hypoplastic thyroid gland, renal cysts, and other findings as above.         Workstation performed: [id number]         CT cervical spine without contrast   Final Result by Bhupendra Martinez DO (08/04 0426)      Bilateral mastoid/middle ear effusions, consider otomastoiditis in the appropriate clinical setting. Calvarium appears intact. No acute intracranial process seen otherwise.      Other findings as above.            CT CERVICAL  SPINE - WITHOUT CONTRAST      INDICATION:   trauma. Fall      COMPARISON:  None.      TECHNIQUE:  CT examination of the cervical spine was performed without intravenous contrast.  Contiguous axial images were obtained. Multiplanar 2D reformatted images were created from the source data.      Radiation dose length product (DLP) for this visit:  891.16 mGy-cm  (accession [id number]), 328.65 mGy-cm  (accession [id number]), 577.22 mGy-cm  (accession [id number]), 0 mGy-cm  (accession [id number]).  This examination, like all CT scans performed in the [facility], was performed utilizing techniques to minimize radiation dose exposure, including the use of iterative reconstruction and automated exposure control.      IMAGE QUALITY:  Diagnostic.      FINDINGS:      ALIGNMENT: Approximately 3 mm of anterolisthesis of C3 on C4, 2 to 3 mm of anterolisthesis of C4 on C5 and C7 on T1 probably related to facet joint arthropathy at these levels. Spinal alignment otherwise grossly maintained.      VERTEBRAE: No acute fracture.      DEGENERATIVE CHANGES: Degenerative changes of the articular pillar of C1 on the right and C2. Sclerosis and subchondral cyst formation in the dens. Intervertebral disc space narrowing at C5/C6 and C6/C7 with anterior, marginal, and uncovertebral    osteophytosis at these levels. Multilevel facet joint arthropathy.      PREVERTEBRAL AND PARASPINAL SOFT TISSUES: Hypoplastic thyroid gland, nonemergent correlation with patient's thyroid function tests recommended. Otherwise grossly unremarkable.      THORACIC INLET:  Please refer to the concurrent chest CT report for thoracic inlet findings.         IMPRESSION:      Degenerative changes as described but no evidence of acute cervical spine injury.      Hypoplastic thyroid gland, nonemergent correlation with patient's thyroid function tests recommended.            CT CHEST, ABDOMEN AND PELVIS WITH IV CONTRAST   CT THORACOLUMBAR SPINE WITHOUT CONTRAST       INDICATION: trauma. Fall      COMPARISON: None.      TECHNIQUE: CT examination of the chest, abdomen and pelvis was performed. Multiplanar 2D reformatted images were created from the source data.      This examination, like all CT scans performed in the [facility], was performed utilizing techniques to minimize radiation dose exposure, including the use of iterative reconstruction and automated exposure control. Radiation dose length    product (DLP) for this visit: 891.16 mGy-cm  (accession [id number]), 328.65 mGy-cm  (accession [id number]), 577.22 mGy-cm  (accession [id number]), 0 mGy-cm  (accession [id number])      IV Contrast: 100 mL of iohexol (OMNIPAQUE)   Enteric Contrast: Not administered.      FINDINGS:      CHEST      LUNGS: Atelectasis noted dependently in the bilateral lower lung fields.      PLEURA: Mild right apical pleural scarring      HEART/GREAT VESSELS: Heart is enlarged. Mild aortic atherosclerosis. Mild coronary atherosclerosis. No thoracic aortic aneurysm.      MEDIASTINUM AND ALBER: Moderate-sized hiatal hernia.      CHEST WALL AND LOWER NECK: Hypoplastic thyroid gland. Degenerative changes of the bilateral shoulders.         ABDOMEN      LIVER/BILIARY TREE: Moderate intra and extrahepatic biliary ductal dilatation. Otherwise grossly unremarkable.      GALLBLADDER: Mildly distended. No calcified gallstones. No pericholecystic inflammatory change.      SPLEEN: Unremarkable.      PANCREAS: Pancreatic atrophy. There is dilatation of the pancreatic duct.      ADRENAL GLANDS: Unremarkable.      KIDNEYS/URETERS: Simple renal cyst(s). Otherwise unremarkable kidneys. No hydronephrosis.      STOMACH AND BOWEL: No evidence of bowel obstruction.      APPENDIX: Not well seen      ABDOMINOPELVIC CAVITY: No ascites. No pneumoperitoneum. No lymphadenopathy.      VESSELS: Mild atherosclerosis      PELVIS      REPRODUCTIVE ORGANS: Grossly unremarkable      URINARY BLADDER: Unremarkable.       ABDOMINAL WALL/INGUINAL REGIONS: Body wall edema. Focal area of fat stranding in the posterior proximal right thigh and infra gluteal region (axial image 249, series 2, for example), question developing decubitus ulcer.      BONES: Old fracture of the right hip with 2 metallic fixation screws. Hardware appears intact. Severe degenerative changes of the right hip. Generalized osteopenia.      S-shaped curvature of the thoracolumbar spine, lower lumbar curve apex to the left. Vertebral hemangiomas in the L5 and T6 vertebral bodies.      Multilevel degenerative changes with intervertebral disc space narrowing, vacuum disc phenomenon, and facet joint arthropathy, more prominent in the lower thoracic and in the lumbar spine.         IMPRESSION:      No discrete evidence of acute visceral or vascular injury in the chest, abdomen, or pelvis      Biliary ductal dilatation as well as dilatation of the main pancreatic duct. Nonspecific but raises the suspicion for stricture and/or mass/lesion in the region of the ampulla. Correlation with the patient's symptoms and laboratory values recommended.    Nonemergent MR/MRCP may be of benefit for further evaluation at the discretion of the referring caregiver.      Focal area of fat stranding in the posterior proximal right thigh and infra gluteal region (axial image 249, series 2, for example), question posttraumatic injury versus developing decubitus ulcer.      S-shaped curvature of the thoracolumbar spine with multilevel degenerative changes and generalized osteopenia. No acute fracture is seen.      Cardiomegaly, coronary atherosclerosis, hiatal hernia, hypoplastic thyroid gland, renal cysts, and other findings as above.         Workstation performed: [id number]         CT chest abdomen pelvis w contrast   Final Result by Bhupendra Martinez DO (08/04 1646)      Bilateral mastoid/middle ear effusions, consider otomastoiditis in the appropriate clinical setting.  Calvarium appears intact. No acute intracranial process seen otherwise.      Other findings as above.            CT CERVICAL SPINE - WITHOUT CONTRAST      INDICATION:   trauma. Fall      COMPARISON:  None.      TECHNIQUE:  CT examination of the cervical spine was performed without intravenous contrast.  Contiguous axial images were obtained. Multiplanar 2D reformatted images were created from the source data.      Radiation dose length product (DLP) for this visit:  891.16 mGy-cm  (accession [id number]), 328.65 mGy-cm  (accession [id number]), 577.22 mGy-cm  (accession [id number]), 0 mGy-cm  (accession [id number]).  This examination, like all CT scans performed in the [facility], was performed utilizing techniques to minimize radiation dose exposure, including the use of iterative reconstruction and automated exposure control.      IMAGE QUALITY:  Diagnostic.      FINDINGS:      ALIGNMENT: Approximately 3 mm of anterolisthesis of C3 on C4, 2 to 3 mm of anterolisthesis of C4 on C5 and C7 on T1 probably related to facet joint arthropathy at these levels. Spinal alignment otherwise grossly maintained.      VERTEBRAE: No acute fracture.      DEGENERATIVE CHANGES: Degenerative changes of the articular pillar of C1 on the right and C2. Sclerosis and subchondral cyst formation in the dens. Intervertebral disc space narrowing at C5/C6 and C6/C7 with anterior, marginal, and uncovertebral    osteophytosis at these levels. Multilevel facet joint arthropathy.      PREVERTEBRAL AND PARASPINAL SOFT TISSUES: Hypoplastic thyroid gland, nonemergent correlation with patient's thyroid function tests recommended. Otherwise grossly unremarkable.      THORACIC INLET:  Please refer to the concurrent chest CT report for thoracic inlet findings.         IMPRESSION:      Degenerative changes as described but no evidence of acute cervical spine injury.      Hypoplastic thyroid gland, nonemergent correlation with patient's thyroid  function tests recommended.            CT CHEST, ABDOMEN AND PELVIS WITH IV CONTRAST   CT THORACOLUMBAR SPINE WITHOUT CONTRAST      INDICATION: trauma. Fall      COMPARISON: None.      TECHNIQUE: CT examination of the chest, abdomen and pelvis was performed. Multiplanar 2D reformatted images were created from the source data.      This examination, like all CT scans performed in the [facility], was performed utilizing techniques to minimize radiation dose exposure, including the use of iterative reconstruction and automated exposure control. Radiation dose length    product (DLP) for this visit: 891.16 mGy-cm  (accession [id number]), 328.65 mGy-cm  (accession [id number]), 577.22 mGy-cm  (accession [id number]), 0 mGy-cm  (accession [id number])      IV Contrast: 100 mL of iohexol (OMNIPAQUE)   Enteric Contrast: Not administered.      FINDINGS:      CHEST      LUNGS: Atelectasis noted dependently in the bilateral lower lung fields.      PLEURA: Mild right apical pleural scarring      HEART/GREAT VESSELS: Heart is enlarged. Mild aortic atherosclerosis. Mild coronary atherosclerosis. No thoracic aortic aneurysm.      MEDIASTINUM AND ALBER: Moderate-sized hiatal hernia.      CHEST WALL AND LOWER NECK: Hypoplastic thyroid gland. Degenerative changes of the bilateral shoulders.         ABDOMEN      LIVER/BILIARY TREE: Moderate intra and extrahepatic biliary ductal dilatation. Otherwise grossly unremarkable.      GALLBLADDER: Mildly distended. No calcified gallstones. No pericholecystic inflammatory change.      SPLEEN: Unremarkable.      PANCREAS: Pancreatic atrophy. There is dilatation of the pancreatic duct.      ADRENAL GLANDS: Unremarkable.      KIDNEYS/URETERS: Simple renal cyst(s). Otherwise unremarkable kidneys. No hydronephrosis.      STOMACH AND BOWEL: No evidence of bowel obstruction.      APPENDIX: Not well seen      ABDOMINOPELVIC CAVITY: No ascites. No pneumoperitoneum. No lymphadenopathy.      VESSELS:  Mild atherosclerosis      PELVIS      REPRODUCTIVE ORGANS: Grossly unremarkable      URINARY BLADDER: Unremarkable.      ABDOMINAL WALL/INGUINAL REGIONS: Body wall edema. Focal area of fat stranding in the posterior proximal right thigh and infra gluteal region (axial image 249, series 2, for example), question developing decubitus ulcer.      BONES: Old fracture of the right hip with 2 metallic fixation screws. Hardware appears intact. Severe degenerative changes of the right hip. Generalized osteopenia.      S-shaped curvature of the thoracolumbar spine, lower lumbar curve apex to the left. Vertebral hemangiomas in the L5 and T6 vertebral bodies.      Multilevel degenerative changes with intervertebral disc space narrowing, vacuum disc phenomenon, and facet joint arthropathy, more prominent in the lower thoracic and in the lumbar spine.         IMPRESSION:      No discrete evidence of acute visceral or vascular injury in the chest, abdomen, or pelvis      Biliary ductal dilatation as well as dilatation of the main pancreatic duct. Nonspecific but raises the suspicion for stricture and/or mass/lesion in the region of the ampulla. Correlation with the patient's symptoms and laboratory values recommended.    Nonemergent MR/MRCP may be of benefit for further evaluation at the discretion of the referring caregiver.      Focal area of fat stranding in the posterior proximal right thigh and infra gluteal region (axial image 249, series 2, for example), question posttraumatic injury versus developing decubitus ulcer.      S-shaped curvature of the thoracolumbar spine with multilevel degenerative changes and generalized osteopenia. No acute fracture is seen.      Cardiomegaly, coronary atherosclerosis, hiatal hernia, hypoplastic thyroid gland, renal cysts, and other findings as above.         Workstation performed: [id number]         CT recon only thoracolumbar   Final Result by Bhupendra Martinez DO (08/04 0426)       Bilateral mastoid/middle ear effusions, consider otomastoiditis in the appropriate clinical setting. Calvarium appears intact. No acute intracranial process seen otherwise.      Other findings as above.            CT CERVICAL SPINE - WITHOUT CONTRAST      INDICATION:   trauma. Fall      COMPARISON:  None.      TECHNIQUE:  CT examination of the cervical spine was performed without intravenous contrast.  Contiguous axial images were obtained. Multiplanar 2D reformatted images were created from the source data.      Radiation dose length product (DLP) for this visit:  891.16 mGy-cm  (accession [id number]), 328.65 mGy-cm  (accession [id number]), 577.22 mGy-cm  (accession [id number]), 0 mGy-cm  (accession [id number]).  This examination, like all CT scans performed [facility] the [facility], was performed utilizing techniques to minimize radiation dose exposure, including the use of iterative reconstruction and automated exposure control.      IMAGE QUALITY:  Diagnostic.      FINDINGS:      ALIGNMENT: Approximately 3 mm of anterolisthesis of C3 on C4, 2 to 3 mm of anterolisthesis of C4 on C5 and C7 on T1 probably related to facet joint arthropathy at these levels. Spinal alignment otherwise grossly maintained.      VERTEBRAE: No acute fracture.      DEGENERATIVE CHANGES: Degenerative changes of the articular pillar of C1 on the right and C2. Sclerosis and subchondral cyst formation in the dens. Intervertebral disc space narrowing at C5/C6 and C6/C7 with anterior, marginal, and uncovertebral    osteophytosis at these levels. Multilevel facet joint arthropathy.      PREVERTEBRAL AND PARASPINAL SOFT TISSUES: Hypoplastic thyroid gland, nonemergent correlation with patient's thyroid function tests recommended. Otherwise grossly unremarkable.      THORACIC INLET:  Please refer to the concurrent chest CT report for thoracic inlet findings.         IMPRESSION:      Degenerative changes as described but no evidence of acute  cervical spine injury.      Hypoplastic thyroid gland, nonemergent correlation with patient's thyroid function tests recommended.            CT CHEST, ABDOMEN AND PELVIS WITH IV CONTRAST   CT THORACOLUMBAR SPINE WITHOUT CONTRAST      INDICATION: trauma. Fall      COMPARISON: None.      TECHNIQUE: CT examination of the chest, abdomen and pelvis was performed. Multiplanar 2D reformatted images were created from the source data.      This examination, like all CT scans performed in the [facility], was performed utilizing techniques to minimize radiation dose exposure, including the use of iterative reconstruction and automated exposure control. Radiation dose length    product (DLP) for this visit: 891.16 mGy-cm  (accession [id number]), 328.65 mGy-cm  (accession [id number]), 577.22 mGy-cm  (accession [id number]), 0 mGy-cm  (accession [id number])      IV Contrast: 100 mL of iohexol (OMNIPAQUE)   Enteric Contrast: Not administered.      FINDINGS:      CHEST      LUNGS: Atelectasis noted dependently in the bilateral lower lung fields.      PLEURA: Mild right apical pleural scarring      HEART/GREAT VESSELS: Heart is enlarged. Mild aortic atherosclerosis. Mild coronary atherosclerosis. No thoracic aortic aneurysm.      MEDIASTINUM AND ALBER: Moderate-sized hiatal hernia.      CHEST WALL AND LOWER NECK: Hypoplastic thyroid gland. Degenerative changes of the bilateral shoulders.         ABDOMEN      LIVER/BILIARY TREE: Moderate intra and extrahepatic biliary ductal dilatation. Otherwise grossly unremarkable.      GALLBLADDER: Mildly distended. No calcified gallstones. No pericholecystic inflammatory change.      SPLEEN: Unremarkable.      PANCREAS: Pancreatic atrophy. There is dilatation of the pancreatic duct.      ADRENAL GLANDS: Unremarkable.      KIDNEYS/URETERS: Simple renal cyst(s). Otherwise unremarkable kidneys. No hydronephrosis.      STOMACH AND BOWEL: No evidence of bowel obstruction.      APPENDIX: Not  well seen      ABDOMINOPELVIC CAVITY: No ascites. No pneumoperitoneum. No lymphadenopathy.      VESSELS: Mild atherosclerosis      PELVIS      REPRODUCTIVE ORGANS: Grossly unremarkable      URINARY BLADDER: Unremarkable.      ABDOMINAL WALL/INGUINAL REGIONS: Body wall edema. Focal area of fat stranding in the posterior proximal right thigh and infra gluteal region (axial image 249, series 2, for example), question developing decubitus ulcer.      BONES: Old fracture of the right hip with 2 metallic fixation screws. Hardware appears intact. Severe degenerative changes of the right hip. Generalized osteopenia.      S-shaped curvature of the thoracolumbar spine, lower lumbar curve apex to the left. Vertebral hemangiomas in the L5 and T6 vertebral bodies.      Multilevel degenerative changes with intervertebral disc space narrowing, vacuum disc phenomenon, and facet joint arthropathy, more prominent in the lower thoracic and in the lumbar spine.         IMPRESSION:      No discrete evidence of acute visceral or vascular injury in the chest, abdomen, or pelvis      Biliary ductal dilatation as well as dilatation of the main pancreatic duct. Nonspecific but raises the suspicion for stricture and/or mass/lesion in the region of the ampulla. Correlation with the patient's symptoms and laboratory values recommended.    Nonemergent MR/MRCP may be of benefit for further evaluation at the discretion of the referring caregiver.      Focal area of fat stranding in the posterior proximal right thigh and infra gluteal region (axial image 249, series 2, for example), question posttraumatic injury versus developing decubitus ulcer.      S-shaped curvature of the thoracolumbar spine with multilevel degenerative changes and generalized osteopenia. No acute fracture is seen.      Cardiomegaly, coronary atherosclerosis, hiatal hernia, hypoplastic thyroid gland, renal cysts, and other findings as above.         Workstation performed:  [id number]                    Procedures  Procedures         ED Course  ED Course as of 08/04/24 0507   Sun Aug 04, 2024   0212 Comprehensive metabolic panel(!)  Normal    0212 CBC and differential(!)  At baseline    0213 Blood gas, venous(!)  The CO2 is elevated but pH is normal.  Will repeat if patient's mental status persists.  Patient is on oxygen at this time                                 SBIRT 22yo+      Flowsheet Row Most Recent Value   Initial Alcohol Screen: US AUDIT-C     1. How often do you have a drink containing alcohol? 0 Filed at: 08/04/2024 0107   2. How many drinks containing alcohol do you have on a typical day you are drinking?  0 Filed at: 08/04/2024 0107   3b. FEMALE Any Age, or MALE 65+: How often do you have 4 or more drinks on one occassion? 0 Filed at: 08/04/2024 0107   Audit-C Score 0 Filed at: 08/04/2024 0107   JOHN: How many times in the past year have you...    Used an illegal drug or used a prescription medication for non-medical reasons? Never Filed at: 08/04/2024 0107                      Medical Decision Making  Patient with no major outward signs of trauma at this time.  She has a healing wound that is wrapped to her left ankle.  She has full range of motion of her right shoulder.  No deformity felt of her clavicle or shoulder.  Patient has no outward signs of head injury and does have pain to palpation to her midline neck and the right trapezius.  Patient does have a history of cervical fractures and dislocation.  (Nondisplaced fracture of C1 right posterior arch and an increased anterior atlantodental interval.)  She is not in a collar at this time.  She does not appear to have any focal neurologic deficit.  Patient does fall asleep on exam.  She is currently on oxygen for the hypoxia that EMS found.  I believe this is probably medication induced.  I will hold off on any pain medications at this time given her lethargic nature, hypoxia for fear of worsening decreased respiratory  drive.  Will obtain a VBG, basic labs and trauma scans to evaluate for possible other underlying injury and to reevaluate her cervical spine fractures and dislocation.    4:50 AM  Labs are at baseline.  No traumatic injury noted on CT scans.  Incidental findings noted.  I do not feel that she has a clinical picture consistent with otomastoiditis.  This was seen on her July CT as well.  There was an incidental finding of a possible developing decubitus ulcer.  I did reexamine this area with the nursing staff.  Patient does have evidence of a small ulceration.  We will place Mepilex on it and I will chart this for the facility to keep an eye on it.  There are no surrounding signs of cellulitis or necrotizing fasciitis.    Plan is to discharge home.  Will not had any further medications to the patient's regime as she is on multiple different pain medications.  Referral to wound care placed.    Amount and/or Complexity of Data Reviewed  Labs: ordered. Decision-making details documented in ED Course.  Radiology: ordered.    Risk  Prescription drug management.                 Disposition  Final diagnoses:   Headache   Neck pain   Shoulder pain   Pressure injury of right thigh, stage 1     Time reflects when diagnosis was documented in both MDM as applicable and the Disposition within this note       Time User Action Codes Description Comment    8/4/2024  5:02 AM SmeriglJohnny frost Add [R51.9] Headache     8/4/2024  5:02 AM SmeriglJohnny frost Add [M54.2] Neck pain     8/4/2024  5:02 AM SmeriglJohnny frost Add [M25.519] Shoulder pain     8/4/2024  5:03 AM SmeriglJohnny frost Add [L89.211] Pressure injury of right thigh, stage 1           ED Disposition       ED Disposition   Discharge    Condition   Stable    Date/Time   Sun Aug 4, 2024 4408    Comment   [place] discharge to home/self care.                   Follow-up Information       Follow up With Specialties Details Why Contact Info Additional Information    [facility] Call today To schedule an appointment as soon as you can [address] [facility] [id number] [phone] [facility], [address] [place] [address], [address] [phone]            Patient's Medications   Discharge Prescriptions    No medications on file           PDMP Review       None            ED Provider  Electronically Signed by             Johnny Melgoza Jr.,   08/04/24 050

## 2024-08-04 NOTE — DISCHARGE INSTRUCTIONS
Patient appears to be having a development of a decubitus ulcer to the posterior aspect of her right thigh.  This needs to be followed closely.  Please follow-up with either your wound care team or our wound care department to help guide management.

## 2024-08-08 ENCOUNTER — TELEPHONE (OUTPATIENT)
Age: 74
End: 2024-08-08

## 2024-08-08 NOTE — TELEPHONE ENCOUNTER
Caller: Ariel Manjarrez    Doctor: n/a    Reason for call: calling to see if the pt has ever been seen by us.  Pt is looking for PM and currently has opioid medications.  Josseline is going to speak with the pt and [facility] if [facility] has [facility] there in the past    Call back#: [phone]

## 2024-08-12 ENCOUNTER — IN HOME VISIT (OUTPATIENT)
Dept: WOUND CARE | Facility: HOSPITAL | Age: 74
End: 2024-08-12
Payer: MEDICARE

## 2024-08-12 DIAGNOSIS — T14.8XXA TRAUMATIC INJURY TO SKIN OR SUBCUTANEOUS TISSUE: Primary | ICD-10-CM

## 2024-08-12 DIAGNOSIS — L25.8 DERMATITIS ASSOCIATED WITH INCONTINENCE: ICD-10-CM

## 2024-08-12 DIAGNOSIS — R32 DERMATITIS ASSOCIATED WITH INCONTINENCE: ICD-10-CM

## 2024-08-12 PROCEDURE — 99348 HOME/RES VST EST LOW MDM 30: CPT | Performed by: NURSE PRACTITIONER

## 2024-08-12 NOTE — ASSESSMENT & PLAN NOTE
Left ankle  Onset unknown, seems chronic  Wound improved, decrease wound size  Ordered local wound care with calcium alginate.  Alginate is a autolytic debrider, and also appropriate for moderate amount of serous drainage  Continue to offload  As per report, patient had been removing her wound dressing  Increase protein intake  Follow-up : two weeks

## 2024-08-12 NOTE — PROGRESS NOTES
[facility] WOUND CARE MANAGEMENT   AND HYPERBARIC MEDICINE CENTER       Patient ID: Florida Bunn is a 73 y.o. female Date of Birth 1950     Location of Service: [place] [facility]    Performed wound round with: Wound team     Chief Complaint : Left  outer ankle    Wound Instructions:  Wound:  Left outer ankle  Discontinue previous wound order  Cleanse the wound bed with NSS   Apply non-sting skin prep to periwound area  Apply calcium alginate to wound bed, then cover with bordered gauze   Frequency : three times a week and prn for soiling    Wound:  Buttocks  Discontinue previous wound order  Cleanse the skin/wound bed with soap and water, pat dry  Apply hydraguard to wound bed/skin  Frequency : twice a day and prn for soiling    Continue to offload  Offload all wounds  Turn and reposition frequently  Instruct / Assist with weight shifting in wheelchair  Increase protein intake.  Monitor for any sign of infection or worsening, inform PCP or patient's primary physician in your facility.      Allergies  Penicillins and Codeine      Assessment & Plan:  1. Traumatic injury to skin or subcutaneous tissue  Assessment & Plan:  Left ankle  Onset unknown, seems chronic  Wound improved, decrease wound size  Ordered local wound care with calcium alginate.  Alginate is a autolytic debrider, and also appropriate for moderate amount of serous drainage  Continue to offload  As per report, patient had been removing her wound dressing  Increase protein intake  Follow-up : two weeks  2. Dermatitis associated with incontinence  Assessment & Plan:  Buttocks  Wound is healed  Continue to offload  Hydraguard for moisture management               Subjective:   7/15/2024This is a 73 y.o., female referred to our service for wound/ skin alterations on left ankle.Patient have a complex medical history including but not limited to  has a past medical history of Cauda equina syndrome with neurogenic bladder (HCC), Depression, recurrent  (HCC), Multiple sclerosis (HCC), Rheumatoid arthritis (HCC) . Patient was referred by Senior Care Team. Patient was seen in collaboration with the facility wound team.     Wound History:   Patient is a poor historian and was not able to provide enough information for the wound.  As per patient, she acquired wound while admitted at [facility].  Patient significant others thought that it probably from wheelchair.    Received patient in bed, nonambulatory.  Wheelchair-bound.  Needs assistance with turning and repositioning in bed.  Denies pain.  Denies diabetes.  Non-smoker.    7/22/2024 Follow up for wound on the left ankle . Received patient, not in distress. Facility staff did not report any significant issues related to the wound. Patient was asleep during visit, as per significant others, patient did not get any sleep due to frequent going to the bathroom.     8/12/2024 Follow up for wound on the left ankle and new comsult for wound on the buttocks . Received patient, not in distress. Facility staff did not report any significant issues related to the wound. Denies pain.                  Review of Systems   Reason unable to perform ROS: asleep.       Objective:    Physical Exam  Constitutional:       Appearance: Normal appearance.   Cardiovascular:      Rate and Rhythm: Normal rate.   Pulmonary:      Effort: Pulmonary effort is normal.   Musculoskeletal:      Right lower leg: No edema.      Left lower leg: No edema.      Comments: Positive pedal pulses  With overlapping toes     Skin:     Findings: Lesion present.      Comments: Left ankle: Wound sizes 0.5 x 0.6 x 0.1 cm.,  100% granulation, moderate amount of serous drainage, periwound normal, no redness, no erythema, with no obvious sign of infection, denies tenderness on palpation   Neurological:      Mental Status: She is alert.              Procedures           Patient's care was coordinated with nursing facility staff. Recent vitals, labs and updated  medications were reviewed on EMR or chart system of facility. Past Medical, surgical, social, medication and allergy history and patient's previous records were reviewed and updated as appropriate: Most up-to date information is available in the facility EMR where the patient is currently admitted.    Patient Active Problem List   Diagnosis    Vitamin D deficiency    Drug-induced constipation    Chronic pain    Multiple sclerosis (HCC)    Adopted    Wheelchair dependence    Neurogenic bladder    Medical marijuana use    Excessive carbohydrate intake    Continuous opioid dependence (HCC)    Depression, recurrent (HCC)    Acquired hypothyroidism    Positive colorectal cancer screening using Cologuard test    Chronic UTI    Self-catheterizes urinary bladder    Traumatic injury to skin or subcutaneous tissue    Dermatitis associated with incontinence     Past Medical History:   Diagnosis Date    Acute cystitis     Anxiety     Arthritis     Cauda equina syndrome with neurogenic bladder (HCC)     Depression, recurrent (HCC) 2022    Disease of thyroid gland     Dysuria     Multiple sclerosis (HCC)     Neurogenic bladder     Nocturia     Rheumatoid arthritis (HCC)     UTI (urinary tract infection)      Past Surgical History:   Procedure Laterality Date    BREAST EXCISIONAL BIOPSY Bilateral     greater than 12 years ago    CYSTOSCOPY  2012    KNEE SURGERY       Social History     Socioeconomic History    Marital status: /Civil Union     Spouse name: None    Number of children: None    Years of education: None    Highest education level: None   Occupational History    None   Tobacco Use    Smoking status: Former     Current packs/day: 0.00     Average packs/day: 0.3 packs/day for 3.0 years (0.8 ttl pk-yrs)     Types: Cigarettes     Start date:      Quit date:      Years since quittin.6    Smokeless tobacco: Never   Vaping Use    Vaping status: Some Days    Substances: THC   Substance and Sexual  Activity    Alcohol use: No    Drug use: Yes     Types: Marijuana     Comment: Vape pen for pain - rarely - medical marijuana    Sexual activity: Not Currently   Other Topics Concern    None   Social History Narrative    None     Social Determinants of Health     Financial Resource Strain: Low Risk  (3/4/2024)    [facility]    Overall Financial Resource Strain (CARDIA)     Difficulty of Paying Living Expenses: Not very hard   Recent Concern: Financial Resource Strain - Medium Risk (2/12/2024)    Overall Financial Resource Strain (CARDIA)     Difficulty of Paying Living Expenses: Somewhat hard   Food Insecurity: No Food Insecurity (3/4/2024)    [facility]    Hunger Vital Sign     Worried About Running Out of Food in the Last Year: Never true     Ran Out of Food in the Last Year: Never true   Transportation Needs: No Transportation Needs (3/4/2024)    [facility]    PRAPARE - Transportation     Lack of Transportation (Medical): No     Lack of Transportation (Non-Medical): No   Physical Activity: Not on file   Stress: Not on file   Social Connections: Not on file   Intimate Partner Violence: Not At Risk (3/4/2024)    [facility]    Humiliation, Afraid, Rape, and Kick questionnaire     Fear of Current or Ex-Partner: No     Emotionally Abused: No     Physically Abused: No     Sexually Abused: No   Housing Stability: Low Risk  (3/4/2024)    [facility]    Housing Stability Vital Sign     Unable to Pay for Housing in the Last Year: No     Number of Places Lived in the Last Year: 1     Unstable Housing in the Last Year: No        Current Outpatient Medications:     ALPRAZolam (XANAX) 1 mg tablet, Take 1 tablet (1 mg total)  by mouth 2 (two) times a day as needed for anxiety, Disp: 60 tablet, Rfl: 1    aspirin-dipyridamole (AGGRENOX)  mg per 12 hr capsule, Take 1 capsule by mouth every 12 (twelve) hours, Disp: , Rfl:     baclofen 10 mg tablet, Take 1 tablet (10 mg total) by mouth 4 (four) times a day, Disp: 360 tablet, Rfl: 1    bisacodyl (DULCOLAX) 5 mg EC tablet, Please follow prep instructions provided by the office. (Patient taking differently: Take 5 mg by mouth daily as needed for constipation), Disp: 2 tablet, Rfl: 0    buPROPion (WELLBUTRIN SR) 200 MG 12 hr tablet, Take 1 tablet by mouth 2 (two) times a day  , Disp: , Rfl:     carBAMazepine (TEGretol XR) 200 mg 12 hr tablet, Take 200 mg by mouth 2 (two) times a day  , Disp: , Rfl:     DULoxetine (CYMBALTA) 30 mg delayed release capsule, Take 1 capsule (30 mg total) by mouth daily, Disp: 30 capsule, Rfl: 5    ergocalciferol (VITAMIN D2) 50,000 units, Take 1 capsule (50,000 Units total) by mouth once a week, Disp: 12 capsule, Rfl: 1    levothyroxine (Synthroid) 100 mcg tablet, Take with 200 mcg daily total 300 mcg, Disp: 90 tablet, Rfl: 1    levothyroxine 200 mcg tablet, TAKE 1 TABLET BY MOUTH  DAILY, Disp: , Rfl:     Linzess 290 MCG CAPS, Take 290 mcg by mouth in the morning, Disp: , Rfl:     methadone (DOLOPHINE) 10 mg tablet, Take two tabs by mouth three times daily and three tabs at bedtime.  Long term medication,, Disp: , Rfl:     methylphenidate (RITALIN) 20 MG tablet, Three tabs daily - Long term medication, Disp: , Rfl:     morphine (MS CONTIN) 60 mg 12 hr tablet, 2 tabs in am; 1 tab mid-morning; 1 tab mid afternoon; 2 tabs at bedtime.  Long term medication, Disp: , Rfl:     pantoprazole (PROTONIX) 40 mg tablet, Take 1 tablet (40 mg total) by mouth daily, Disp: 90 tablet, Rfl: 1    PARoxetine (PAXIL) 30 mg tablet, Take 1 tablet by mouth in the morning, Disp: , Rfl:     prochlorperazine (COMPAZINE) 25 mg suppository, Insert 25 mg into the rectum daily as needed for  "nausea or vomiting, Disp: , Rfl:     traZODone (DESYREL) 150 mg tablet, Take 1 tablet (150 mg total) by mouth daily at bedtime, Disp: 90 tablet, Rfl: 1  Family History   Adopted: Yes   Problem Relation Age of Onset    No Known Problems Mother     No Known Problems Father               Coordination of Care: Wound team aware of the treatment plan. Facility nurse will provide wound treatment and monitor the wound for any changes.     Patient / Staff education : Patient / Staff was given education on sign of infection and pressure ulcer prevention. Patient/ Staff verbalized understanding     Follow up :  Two weeks    Voice-recognition software may have been used in the preparation of this document. Occasional wrong word or \"sound-alike\" substitutions may have occurred due to the inherent limitations of voice recognition software. Interpretation should be guided by context.      JASON Castaneda  "

## 2024-08-26 ENCOUNTER — IN HOME VISIT (OUTPATIENT)
Dept: WOUND CARE | Facility: HOSPITAL | Age: 74
End: 2024-08-26
Payer: MEDICARE

## 2024-08-26 DIAGNOSIS — R32 DERMATITIS ASSOCIATED WITH INCONTINENCE: ICD-10-CM

## 2024-08-26 DIAGNOSIS — T14.8XXA TRAUMATIC INJURY TO SKIN OR SUBCUTANEOUS TISSUE: Primary | ICD-10-CM

## 2024-08-26 DIAGNOSIS — L25.8 DERMATITIS ASSOCIATED WITH INCONTINENCE: ICD-10-CM

## 2024-08-26 PROCEDURE — 99348 HOME/RES VST EST LOW MDM 30: CPT | Performed by: NURSE PRACTITIONER

## 2024-08-26 NOTE — LETTER
Patient:  Florida Bunn   1950           JASON Castaneda saw [place] Bunn for a wound care visit on 8/26/2024. See below for information relating to this visit.      Chief Complaint   Patient presents with    Follow Up Wound Care Visit        Assessment/Plan:  1. Traumatic injury to skin or subcutaneous tissue  Assessment & Plan:  Left ankle  Onset unknown, seems chronic  Wound improved, decreasing wound size, 100% granulation  Continue local wound care with calcium alginate  Continue to offload  As per report, patient had been removing her wound dressing  Increase protein intake  Follow-up : two weeks  2. Dermatitis associated with incontinence  Assessment & Plan:  Wound on the buttocks is healed  Continue hydraguard for moisture management  Continue to offload         Orders:  Florida Bunn  1950  Wound:  Left outer ankle  Discontinue previous wound order  Cleanse the wound bed with NSS   Apply non-sting skin prep to periwound area  Apply calcium alginate to wound bed, then cover with bordered gauze   Frequency : three times a week and prn for soiling    Wound:  Buttocks  Discontinue previous wound order  Cleanse the skin/wound bed with soap and water, pat dry  Apply hydraguard to wound bed/skin  Frequency : twice a day and prn for soiling    Continue to offload  Offload all wounds  Turn and reposition frequently  Instruct / Assist with weight shifting in wheelchair  Increase protein intake.  Monitor for any sign of infection or worsening, inform PCP or patient's primary physician in your facility.      Follow Up:  Return in about 2 weeks (around 9/9/2024).       [facility] hours are 8:00 am - 4:30 pm Monday through Friday. The center phone number is [phone]. You can also contact me directly thru my email at [email or web address] or thru tiger text. If it is an emergency, please contact the PCP or patient's attending physician in your facility.      Sincerely,    Electronically signed by JASON Castaneda    Patient : [place] PINEDA Sepideh    1950

## 2024-08-26 NOTE — ASSESSMENT & PLAN NOTE
Left ankle  Onset unknown, seems chronic  Wound improved, decreasing wound size, 100% granulation  Continue local wound care with calcium alginate  Continue to offload  As per report, patient had been removing her wound dressing  Increase protein intake  Follow-up : two weeks

## 2024-08-26 NOTE — PROGRESS NOTES
[facility] WOUND CARE MANAGEMENT   AND HYPERBARIC MEDICINE CENTER       Patient ID: Florida Bunn is a 73 y.o. female Date of Birth 1950     Location of Service: [facility]    Performed wound round with: Wound team     Chief Complaint : Left  outer ankle    Wound Instructions:  Wound:  Left outer ankle  Discontinue previous wound order  Cleanse the wound bed with NSS   Apply non-sting skin prep to periwound area  Apply calcium alginate to wound bed, then cover with bordered gauze   Frequency : three times a week and prn for soiling    Wound:  Buttocks  Discontinue previous wound order  Cleanse the skin/wound bed with soap and water, pat dry  Apply hydraguard to wound bed/skin  Frequency : twice a day and prn for soiling    Continue to offload  Offload all wounds  Turn and reposition frequently  Instruct / Assist with weight shifting in wheelchair  Increase protein intake.  Monitor for any sign of infection or worsening, inform PCP or patient's primary physician in your facility.      Allergies  Penicillins and Codeine      Assessment & Plan:  1. Traumatic injury to skin or subcutaneous tissue  Assessment & Plan:  Left ankle  Onset unknown, seems chronic  Wound improved, decreasing wound size, 100% granulation  Continue local wound care with calcium alginate  Continue to offload  As per report, patient had been removing her wound dressing  Increase protein intake  Follow-up : two weeks  2. Dermatitis associated with incontinence  Assessment & Plan:  Wound on the buttocks is healed  Continue hydraguard for moisture management  Continue to offload                 Subjective:   7/15/2024This is a 73 y.o., female referred to our service for wound/ skin alterations on left ankle.Patient have a complex medical history including but not limited to  has a past medical history of Cauda equina syndrome with neurogenic bladder (HCC), Depression, recurrent (HCC), Multiple sclerosis (HCC), Rheumatoid arthritis (HCC) .  Patient was referred by Senior Care Team. Patient was seen in collaboration with the facility wound team.     Wound History:   Patient is a poor historian and was not able to provide enough information for the wound.  As per patient, she acquired wound while admitted at [facility].  Patient significant others thought that it probably from wheelchair.    Received patient in bed, nonambulatory.  Wheelchair-bound.  Needs assistance with turning and repositioning in bed.  Denies pain.  Denies diabetes.  Non-smoker.    7/22/2024 Follow up for wound on the left ankle . Received patient, not in distress. Facility staff did not report any significant issues related to the wound. Patient was asleep during visit, as per significant others, patient did not get any sleep due to frequent going to the bathroom.     8/12/2024 Follow up for wound on the left ankle and new comsult for wound on the buttocks . Received patient, not in distress. Facility staff did not report any significant issues related to the wound. Denies pain.      8/26/2024 Follow up for wound on the left ankle and buttocks. Received patient, not in distress. Facility staff did not report any significant issues related to the wound.  Denies pain.                  Review of Systems   Reason unable to perform ROS: asleep.       Objective:    Physical Exam  Constitutional:       Appearance: Normal appearance.   Cardiovascular:      Rate and Rhythm: Normal rate.   Pulmonary:      Effort: Pulmonary effort is normal.   Musculoskeletal:      Right lower leg: No edema.      Left lower leg: No edema.      Comments: Positive pedal pulses  With overlapping toes     Skin:     Findings: Lesion present.      Comments: Left ankle: Wound size is 0.4 x 0.3 x 0.1 cm.  70% granulation, small amount of serous drainage, periwound is normal, with no obvious sign of infection    Buttocks: Wound is healed   Neurological:      Mental Status: She is alert.              Procedures            Patient's care was coordinated with nursing facility staff. Recent vitals, labs and updated medications were reviewed on EMR or chart system of facility. Past Medical, surgical, social, medication and allergy history and patient's previous records were reviewed and updated as appropriate: Most up-to date information is available in the facility EMR where the patient is currently admitted.    Patient Active Problem List   Diagnosis    Vitamin D deficiency    Drug-induced constipation    Chronic pain    Multiple sclerosis (HCC)    Adopted    Wheelchair dependence    Neurogenic bladder    Medical marijuana use    Excessive carbohydrate intake    Continuous opioid dependence (HCC)    Depression, recurrent (HCC)    Acquired hypothyroidism    Positive colorectal cancer screening using Cologuard test    Chronic UTI    Self-catheterizes urinary bladder    Traumatic injury to skin or subcutaneous tissue    Dermatitis associated with incontinence     Past Medical History:   Diagnosis Date    Acute cystitis     Anxiety     Arthritis     Cauda equina syndrome with neurogenic bladder (HCC)     Depression, recurrent (HCC) 2022    Disease of thyroid gland     Dysuria     Multiple sclerosis (HCC)     Neurogenic bladder     Nocturia     Rheumatoid arthritis (HCC)     UTI (urinary tract infection)      Past Surgical History:   Procedure Laterality Date    BREAST EXCISIONAL BIOPSY Bilateral     greater than 12 years ago    CYSTOSCOPY  2012    KNEE SURGERY       Social History     Socioeconomic History    Marital status: /Civil Union     Spouse name: None    Number of children: None    Years of education: None    Highest education level: None   Occupational History    None   Tobacco Use    Smoking status: Former     Current packs/day: 0.00     Average packs/day: 0.3 packs/day for 3.0 years (0.8 ttl pk-yrs)     Types: Cigarettes     Start date:      Quit date:      Years since quittin.6    Smokeless  tobacco: Never   Vaping Use    Vaping status: Some Days    Substances: THC   Substance and Sexual Activity    Alcohol use: No    Drug use: Yes     Types: Marijuana     Comment: Vape pen for pain - rarely - medical marijuana    Sexual activity: Not Currently   Other Topics Concern    None   Social History Narrative    None     Social Determinants of Health     Financial Resource Strain: Low Risk  (3/4/2024)    [facility], [facility]    Overall Financial Resource Strain (CARDIA)     Difficulty of Paying Living Expenses: Not very hard   Recent Concern: Financial Resource Strain - Medium Risk (2/12/2024)    Overall Financial Resource Strain (CARDIA)     Difficulty of Paying Living Expenses: Somewhat hard   Food Insecurity: No Food Insecurity (3/4/2024)    Received from [facility], [facility]    Hunger Vital Sign     Worried About Running Out of Food in the Last Year: Never true     Ran Out of Food in the Last Year: Never true   Transportation Needs: No Transportation Needs (3/4/2024)    Received from [facility], [facility]    PRAPARE - Transportation     Lack of Transportation (Medical): No     Lack of Transportation (Non-Medical): No   Physical Activity: Not on file   Stress: Not on file   Social Connections: Not on file   Intimate Partner Violence: Not At Risk (3/4/2024)    [facility] from [facility], [facility]    Humiliation, Afraid, Rape, and Kick questionnaire     Fear of Current or Ex-Partner: No     Emotionally Abused: No     Physically Abused: No     Sexually Abused: No   Housing Stability: Low Risk  (3/4/2024)    [facility] from [facility], [facility]    Housing Stability Vital Sign     Unable to Pay for Housing in the Last Year: No     Number of Places Lived in the Last Year: 1     Unstable Housing in the Last Year: No         Current Outpatient Medications:     ALPRAZolam (XANAX) 1 mg tablet, Take 1 tablet (1 mg total) by mouth 2 (two) times a day as needed for anxiety, Disp: 60 tablet, Rfl: 1    aspirin-dipyridamole (AGGRENOX)  mg per 12 hr capsule, Take 1 capsule by mouth every 12 (twelve) hours, Disp: , Rfl:     baclofen 10 mg tablet, Take 1 tablet (10 mg total) by mouth 4 (four) times a day, Disp: 360 tablet, Rfl: 1    bisacodyl (DULCOLAX) 5 mg EC tablet, Please follow prep instructions provided by the office. (Patient taking differently: Take 5 mg by mouth daily as needed for constipation), Disp: 2 tablet, Rfl: 0    buPROPion (WELLBUTRIN SR) 200 MG 12 hr tablet, Take 1 tablet by mouth 2 (two) times a day  , Disp: , Rfl:     carBAMazepine (TEGretol XR) 200 mg 12 hr tablet, Take 200 mg by mouth 2 (two) times a day  , Disp: , Rfl:     DULoxetine (CYMBALTA) 30 mg delayed release capsule, Take 1 capsule (30 mg total) by mouth daily, Disp: 30 capsule, Rfl: 5    ergocalciferol (VITAMIN D2) 50,000 units, Take 1 capsule (50,000 Units total) by mouth once a week, Disp: 12 capsule, Rfl: 1    levothyroxine (Synthroid) 100 mcg tablet, Take with 200 mcg daily total 300 mcg, Disp: 90 tablet, Rfl: 1    levothyroxine 200 mcg tablet, TAKE 1 TABLET BY MOUTH  DAILY, Disp: , Rfl:     Linzess 290 MCG CAPS, Take 290 mcg by mouth in the morning, Disp: , Rfl:     methadone (DOLOPHINE) 10 mg tablet, Take two tabs by mouth three times daily and three tabs at bedtime.  Long term medication,, Disp: , Rfl:     methylphenidate (RITALIN) 20 MG tablet, Three tabs daily - Long term medication, Disp: , Rfl:     morphine (MS CONTIN) 60 mg 12 hr tablet, 2 tabs in am; 1 tab mid-morning; 1 tab mid afternoon; 2 tabs at bedtime.  Long term medication, Disp: , Rfl:     pantoprazole (PROTONIX) 40 mg tablet, Take 1 tablet (40 mg total) by mouth daily, Disp: 90 tablet, Rfl: 1    PARoxetine (PAXIL) 30 mg tablet, Take 1 tablet by mouth in the morning, Disp: , Rfl:      "prochlorperazine (COMPAZINE) 25 mg suppository, Insert 25 mg into the rectum daily as needed for nausea or vomiting, Disp: , Rfl:     traZODone (DESYREL) 150 mg tablet, Take 1 tablet (150 mg total) by mouth daily at bedtime, Disp: 90 tablet, Rfl: 1  Family History   Adopted: Yes   Problem Relation Age of Onset    No Known Problems Mother     No Known Problems Father               Coordination of Care: Wound team aware of the treatment plan. Facility nurse will provide wound treatment and monitor the wound for any changes.     Patient / Staff education : Patient / Staff was given education on sign of infection and pressure ulcer prevention. Patient/ Staff verbalized understanding     Follow up :  Two weeks    Voice-recognition software may have been used in the preparation of this document. Occasional wrong word or \"sound-alike\" substitutions may have occurred due to the inherent limitations of voice recognition software. Interpretation should be guided by context.      JASON Castaneda  "

## 2024-09-08 ENCOUNTER — APPOINTMENT (EMERGENCY)
Dept: RADIOLOGY | Facility: HOSPITAL | Age: 74
End: 2024-09-08
Payer: MEDICARE

## 2024-09-08 ENCOUNTER — HOSPITAL ENCOUNTER (EMERGENCY)
Facility: HOSPITAL | Age: 74
Discharge: HOME | End: 2024-09-08
Attending: EMERGENCY MEDICINE
Payer: MEDICARE

## 2024-09-08 VITALS
DIASTOLIC BLOOD PRESSURE: 73 MMHG | SYSTOLIC BLOOD PRESSURE: 161 MMHG | HEART RATE: 66 BPM | BODY MASS INDEX: 21.06 KG/M2 | WEIGHT: 151.01 LBS | TEMPERATURE: 98.7 F | RESPIRATION RATE: 18 BRPM | OXYGEN SATURATION: 93 %

## 2024-09-08 DIAGNOSIS — M25.519 SHOULDER PAIN: ICD-10-CM

## 2024-09-08 DIAGNOSIS — R51.9 HEADACHE: ICD-10-CM

## 2024-09-08 DIAGNOSIS — M54.2 NECK PAIN: Primary | ICD-10-CM

## 2024-09-08 LAB
ANION GAP SERPL CALCULATED.3IONS-SCNC: 9 MMOL/L (ref 4–13)
BASOPHILS # BLD AUTO: 0.01 THOUSANDS/ÂΜL (ref 0–0.1)
BASOPHILS NFR BLD AUTO: 0 % (ref 0–1)
BUN SERPL-MCNC: 14 MG/DL (ref 5–25)
CALCIUM SERPL-MCNC: 9.9 MG/DL (ref 8.4–10.2)
CHLORIDE SERPL-SCNC: 102 MMOL/L (ref 96–108)
CO2 SERPL-SCNC: 26 MMOL/L (ref 21–32)
CREAT SERPL-MCNC: 0.85 MG/DL (ref 0.6–1.3)
EOSINOPHIL # BLD AUTO: 0.01 THOUSAND/ÂΜL (ref 0–0.61)
EOSINOPHIL NFR BLD AUTO: 0 % (ref 0–6)
ERYTHROCYTE [DISTWIDTH] IN BLOOD BY AUTOMATED COUNT: 13.8 % (ref 11.6–15.1)
GFR SERPL CREATININE-BSD FRML MDRD: 67 ML/MIN/1.73SQ M
GLUCOSE SERPL-MCNC: 89 MG/DL (ref 65–140)
HCT VFR BLD AUTO: 35.9 % (ref 34.8–46.1)
HGB BLD-MCNC: 11.4 G/DL (ref 11.5–15.4)
IMM GRANULOCYTES # BLD AUTO: 0.02 THOUSAND/UL (ref 0–0.2)
IMM GRANULOCYTES NFR BLD AUTO: 1 % (ref 0–2)
LYMPHOCYTES # BLD AUTO: 0.39 THOUSANDS/ÂΜL (ref 0.6–4.47)
LYMPHOCYTES NFR BLD AUTO: 14 % (ref 14–44)
MCH RBC QN AUTO: 31.3 PG (ref 26.8–34.3)
MCHC RBC AUTO-ENTMCNC: 31.8 G/DL (ref 31.4–37.4)
MCV RBC AUTO: 99 FL (ref 82–98)
MONOCYTES # BLD AUTO: 0.19 THOUSAND/ÂΜL (ref 0.17–1.22)
MONOCYTES NFR BLD AUTO: 7 % (ref 4–12)
NEUTROPHILS # BLD AUTO: 2.15 THOUSANDS/ÂΜL (ref 1.85–7.62)
NEUTS SEG NFR BLD AUTO: 78 % (ref 43–75)
NRBC BLD AUTO-RTO: 0 /100 WBCS
PLATELET # BLD AUTO: 185 THOUSANDS/UL (ref 149–390)
PMV BLD AUTO: 8.9 FL (ref 8.9–12.7)
POTASSIUM SERPL-SCNC: 4.7 MMOL/L (ref 3.5–5.3)
RBC # BLD AUTO: 3.64 MILLION/UL (ref 3.81–5.12)
SODIUM SERPL-SCNC: 137 MMOL/L (ref 135–147)
WBC # BLD AUTO: 2.77 THOUSAND/UL (ref 4.31–10.16)

## 2024-09-08 PROCEDURE — 70450 CT HEAD/BRAIN W/O DYE: CPT

## 2024-09-08 PROCEDURE — 36415 COLL VENOUS BLD VENIPUNCTURE: CPT

## 2024-09-08 PROCEDURE — 93005 ELECTROCARDIOGRAM TRACING: CPT

## 2024-09-08 PROCEDURE — 73030 X-RAY EXAM OF SHOULDER: CPT

## 2024-09-08 PROCEDURE — 85025 COMPLETE CBC W/AUTO DIFF WBC: CPT

## 2024-09-08 PROCEDURE — 99285 EMERGENCY DEPT VISIT HI MDM: CPT | Performed by: EMERGENCY MEDICINE

## 2024-09-08 PROCEDURE — 80048 BASIC METABOLIC PNL TOTAL CA: CPT

## 2024-09-08 PROCEDURE — 99284 EMERGENCY DEPT VISIT MOD MDM: CPT

## 2024-09-08 PROCEDURE — 72125 CT NECK SPINE W/O DYE: CPT

## 2024-09-08 RX ORDER — QUETIAPINE FUMARATE 50 MG/1
25 TABLET, EXTENDED RELEASE ORAL
COMMUNITY

## 2024-09-08 RX ORDER — ONDANSETRON 4 MG/1
4 TABLET, ORALLY DISINTEGRATING ORAL ONCE
Status: COMPLETED | OUTPATIENT
Start: 2024-09-08 | End: 2024-09-08

## 2024-09-08 RX ORDER — ACETAMINOPHEN 325 MG/1
650 TABLET ORAL ONCE
Status: COMPLETED | OUTPATIENT
Start: 2024-09-08 | End: 2024-09-08

## 2024-09-08 RX ORDER — OXYCODONE HYDROCHLORIDE 10 MG/1
10 TABLET ORAL ONCE
Status: COMPLETED | OUTPATIENT
Start: 2024-09-08 | End: 2024-09-08

## 2024-09-08 RX ORDER — ALPRAZOLAM 0.5 MG
1 TABLET ORAL ONCE
Status: COMPLETED | OUTPATIENT
Start: 2024-09-08 | End: 2024-09-08

## 2024-09-08 RX ORDER — OXYCODONE HYDROCHLORIDE 15 MG/1
5 TABLET ORAL EVERY 6 HOURS PRN
COMMUNITY

## 2024-09-08 RX ORDER — POLYETHYLENE GLYCOL 3350 17 G/17G
17 POWDER, FOR SOLUTION ORAL 2 TIMES DAILY
COMMUNITY

## 2024-09-08 RX ADMIN — ONDANSETRON 4 MG: 4 TABLET, ORALLY DISINTEGRATING ORAL at 09:29

## 2024-09-08 RX ADMIN — ALPRAZOLAM 1 MG: 0.5 TABLET ORAL at 12:26

## 2024-09-08 RX ADMIN — ACETAMINOPHEN 650 MG: 325 TABLET ORAL at 09:29

## 2024-09-08 RX ADMIN — OXYCODONE HYDROCHLORIDE 10 MG: 10 TABLET ORAL at 12:26

## 2024-09-08 NOTE — ED ATTENDING ATTESTATION
9/8/2024  I, Akosua Diaz MD, saw and evaluated the patient. I have discussed the patient with the resident/non-physician practitioner and agree with the resident's/non-physician practitioner's findings, Plan of Care, and MDM as documented in the resident's/non-physician practitioner's note, except where noted. All available labs and Radiology studies were reviewed.  I was present for key portions of any procedure(s) performed by the resident/non-physician practitioner and I was immediately available to provide assistance.       At this point I agree with the current assessment done in the Emergency Department.  I have conducted an independent evaluation of this patient a history and physical is as follows:  Patient is overall a poor historian.  This is a 73-year-old woman with history of MS, chronic pain, including chronic neck pain, back pain.  Patient states that she has been having a headache and neck pain since a fall over 1 month ago.  Patient does have history of chronic headaches, but states she does not usually vomit with her headaches.  Patient has had 4 episodes of vomiting in the last 24 hours.  She is not having diarrhea and denies abdominal pain.  She has not had double or blurry vision.  She has no new numbness, tingling, or weakness, but the patient does have chronic MS.  She has not had fevers or chills.  The pain occurred after a fall with a head strike, and the patient states that has been persistent since.  She has not had another fall.  Her review of systems otherwise negative in 12 systems reviewed.  On exam the patient is hemodynamically stable, slightly hypertensive.  On head and neck exam, her face is symmetric.  Her pupils are reactive.  Her neck is supple, but she does have some midline tenderness.  Her heart and lung exams are normal.  Neurologically, she moves all 4, squeezes fingers, follows commands, with no acute deficits.  Her abdomen is entirely soft and nontender with no  masses, rebound, or guarding.  She has no pallor.  She is anicteric. MEDICAL DECISION MAKING    Number and Complexity of Problems  Differential diagnosis: Headache, neck pain after remote fall, vomiting, concern for electrolyte disturbance, renal failure, chronic subdural, occult cervical fracture    Medical Decision Making Data  External documents reviewed: Patient's prior x-rays and MRIs reviewed, patient's prior records reviewed, patient with history of chronic pain in numerous locations.  My EKG interpretation: Sinus rhythm, narrow complex, no ischemia or ectopy  My CT interpretation: No acute abnormality  My X-ray interpretation: Evidence of arthritis, no fracture or dislocation  My ultrasound interpretation:     XR shoulder 2+ views RIGHT   ED Interpretation   No acute fracture or dislocation noted.      CT head without contrast   Final Result      No acute intracranial abnormality.  Stable chronic microangiopathic changes within the brain.      Sinus opacification as described above including inspissated mucus within the left maxillary sinus similar to prior study. Bilateral mastoid and middle ear opacification.                  Workstation performed: [id number]         CT spine cervical without contrast   Final Result      No acute osseous abnormality. Cervical spondylitic degenerative change with mild to moderate canal stenosis and foraminal narrowing.                  Workstation performed: [id number]             Labs Reviewed   CBC AND DIFFERENTIAL - Abnormal       Result Value Ref Range Status    WBC 2.77 (*) 4.31 - 10.16 Thousand/uL Final    RBC 3.64 (*) 3.81 - 5.12 Million/uL Final    Hemoglobin 11.4 (*) 11.5 - 15.4 g/dL Final    Hematocrit 35.9  34.8 - 46.1 % Final    MCV 99 (*) 82 - 98 fL Final    MCH 31.3  26.8 - 34.3 pg Final    MCHC 31.8  31.4 - 37.4 g/dL Final    RDW 13.8  11.6 - 15.1 % Final    MPV 8.9  8.9 - 12.7 fL Final    Platelets 185  149 - 390 Thousands/uL Final    nRBC 0  /100 WBCs Final     Segmented % 78 (*) 43 - 75 % Final    Immature Grans % 1  0 - 2 % Final    Lymphocytes % 14  14 - 44 % Final    Monocytes % 7  4 - 12 % Final    Eosinophils Relative 0  0 - 6 % Final    Basophils Relative 0  0 - 1 % Final    Absolute Neutrophils 2.15  1.85 - 7.62 Thousands/µL Final    Absolute Immature Grans 0.02  0.00 - 0.20 Thousand/uL Final    Absolute Lymphocytes 0.39 (*) 0.60 - 4.47 Thousands/µL Final    Absolute Monocytes 0.19  0.17 - 1.22 Thousand/µL Final    Eosinophils Absolute 0.01  0.00 - 0.61 Thousand/µL Final    Basophils Absolute 0.01  0.00 - 0.10 Thousands/µL Final   BASIC METABOLIC PANEL    Sodium 137  135 - 147 mmol/L Final    Potassium 4.7  3.5 - 5.3 mmol/L Final    Comment: Slightly Hemolyzed:Results may be affected.    Chloride 102  96 - 108 mmol/L Final    CO2 26  21 - 32 mmol/L Final    ANION GAP 9  4 - 13 mmol/L Final    BUN 14  5 - 25 mg/dL Final    Creatinine 0.85  0.60 - 1.30 mg/dL Final    Comment: Standardized to IDMS reference method    Glucose 89  65 - 140 mg/dL Final    Comment: If the patient is fasting, the ADA then defines impaired fasting glucose as > 100 mg/dL and diabetes as > or equal to 123 mg/dL.    Calcium 9.9  8.4 - 10.2 mg/dL Final    eGFR 67  ml/min/1.73sq m Final    Narrative:     National Kidney Disease Foundation guidelines for Chronic Kidney Disease (CKD):     Stage 1 with normal or high GFR (GFR > 90 mL/min/1.73 square meters)    Stage 2 Mild CKD (GFR = 60-89 mL/min/1.73 square meters)    Stage 3A Moderate CKD (GFR = 45-59 mL/min/1.73 square meters)    Stage 3B Moderate CKD (GFR = 30-44 mL/min/1.73 square meters)    Stage 4 Severe CKD (GFR = 15-29 mL/min/1.73 square meters)    Stage 5 End Stage CKD (GFR <15 mL/min/1.73 square meters)  Note: GFR calculation is accurate only with a steady state creatinine       Labs reviewed by me are significant for: Consistent with prior labs    Clinical decision rules/scores are significant for:     Discussed case with:    Considered admission for:     Treatment and Disposition  ED course: Headache, chronic neck pain, chronic osteoarthritis  Shared decision making:   Code status:     ED Course         Critical Care Time  Procedures

## 2024-09-08 NOTE — ED PROVIDER NOTES
History  Chief Complaint   Patient presents with    Nausea     Pt reports nause and vomiting that has been going on for the last couple of days. Pt denies CP. SOB, urinary symptoms and diarrhea. Pt reports feeling hot no chills.    Neck Pain     Pt report neck pain that has been going on for months     Patient is a 73-year-old female who presents today with neck pain and headaches that started about a month after a fall out of her bed at the nursing home.  She states that she was never evaluated for this.  She states that she fell because she was reaching for something and felt the bed was wider than it was and took a tumble out of the bed.  Denies head strike, loss of consciousness, blood thinners, aspirin.  She states that she feels creaking in her neck and has been having right-sided neck pain since.  She denies any visual changes, hearing issues but endorses nausea and vomiting along with the headaches.  Denies chest pain, shortness of breath, abdominal pain.  Patient is also complaining of right shoulder pain.  Patient states he threw up 2 times today but does not normally vomit from the headaches.      Nausea  The primary symptoms include nausea and vomiting. Primary symptoms do not include fever, abdominal pain, dysuria or rash.   Neck Pain  Associated symptoms: no chest pain and no fever        Prior to Admission Medications   Prescriptions Last Dose Informant Patient Reported? Taking?   ALPRAZolam (XANAX) 1 mg tablet Unknown Spouse/Significant Other, Self No No   Sig: Take 1 tablet (1 mg total) by mouth 2 (two) times a day as needed for anxiety   DULoxetine (CYMBALTA) 30 mg delayed release capsule Not Taking  No No   Sig: Take 1 capsule (30 mg total) by mouth daily   Patient not taking: Reported on 9/8/2024   Linzess 290 MCG CAPS  Self, Spouse/Significant Other Yes Yes   Sig: Take 290 mcg by mouth in the morning   PARoxetine (PAXIL) 30 mg tablet  Self, Spouse/Significant Other, Outside Facility (Specify)  Yes Yes   Sig: Take 40 mg by mouth in the morning   QUEtiapine (SEROquel XR) 50 mg  Outside Facility (Specify) Yes Yes   Sig: Take 25 mg by mouth daily at bedtime   aspirin-dipyridamole (AGGRENOX)  mg per 12 hr capsule  Self, Spouse/Significant Other Yes Yes   Sig: Take 1 capsule by mouth every 12 (twelve) hours   baclofen 10 mg tablet   No Yes   Sig: Take 1 tablet (10 mg total) by mouth 4 (four) times a day   bisacodyl (DULCOLAX) 5 mg EC tablet Not Taking Self, Spouse/Significant Other No No   Sig: Please follow prep instructions provided by the office.   Patient not taking: Reported on 2024   buPROPion (WELLBUTRIN SR) 200 MG 12 hr tablet  Self, Spouse/Significant Other Yes Yes   Sig: Take 1 tablet by mouth 2 (two) times a day     carBAMazepine (TEGretol XR) 200 mg 12 hr tablet  Self, Spouse/Significant Other Yes Yes   Sig: Take 200 mg by mouth 2 (two) times a day     ergocalciferol (VITAMIN D2) 50,000 units Not Taking Spouse/Significant Other, Self No No   Sig: Take 1 capsule (50,000 Units total) by mouth once a week   Patient not taking: Reported on 2024   levothyroxine (Synthroid) 100 mcg tablet   No Yes   Sig: Take with 200 mcg daily total 300 mcg   levothyroxine 200 mcg tablet  Self, Spouse/Significant Other Yes Yes   Sig: TAKE 1 TABLET BY MOUTH  DAILY   methadone (DOLOPHINE) 10 mg tablet  Self, Spouse/Significant Other, Outside Facility (Specify) Yes Yes   Sig: Take 30 mg by mouth 3 (three) times a day,   methylphenidate (RITALIN) 20 MG tablet  Self, Spouse/Significant Other, Outside Facility (Specify) Yes Yes   Sig: Take 20 mg by mouth in the morning   morphine (MS CONTIN) 60 mg 12 hr tablet  Self, Spouse/Significant Other Yes Yes   Si tabs in am; 1 tab mid-morning; 1 tab mid afternoon; 2 tabs at bedtime.  Long term medication   oxyCODONE (ROXICODONE) 15 mg immediate release tablet  Outside Facility (Specify) Yes Yes   Sig: Take 5 mg by mouth every 6 (six) hours as needed for moderate pain    pantoprazole (PROTONIX) 40 mg tablet   No Yes   Sig: Take 1 tablet (40 mg total) by mouth daily   polyethylene glycol (MIRALAX) 17 g packet  Outside Facility (Specify) Yes Yes   Sig: Take 17 g by mouth 2 (two) times a day   prochlorperazine (COMPAZINE) 25 mg suppository  Self, Spouse/Significant Other Yes Yes   Sig: Insert 25 mg into the rectum daily as needed for nausea or vomiting   traZODone (DESYREL) 150 mg tablet Not Taking Self, Spouse/Significant Other No No   Sig: Take 1 tablet (150 mg total) by mouth daily at bedtime   Patient not taking: Reported on 2024      Facility-Administered Medications: None       Past Medical History:   Diagnosis Date    Acute cystitis     Anxiety     Arthritis     Cauda equina syndrome with neurogenic bladder (HCC)     Depression, recurrent (HCC) 2022    Disease of thyroid gland     Dysuria     Multiple sclerosis (HCC)     Neurogenic bladder     Nocturia     Rheumatoid arthritis (HCC)     UTI (urinary tract infection)        Past Surgical History:   Procedure Laterality Date    BREAST EXCISIONAL BIOPSY Bilateral     greater than 12 years ago    CYSTOSCOPY  2012    KNEE SURGERY         Family History   Adopted: Yes   Problem Relation Age of Onset    No Known Problems Mother     No Known Problems Father      I have reviewed and agree with the history as documented.    E-Cigarette/Vaping    E-Cigarette Use Current Some Day User     Comments medical marijuana      E-Cigarette/Vaping Substances    Nicotine No     THC Yes     CBD No     Flavoring No     Other No      Social History     Tobacco Use    Smoking status: Former     Current packs/day: 0.00     Average packs/day: 0.3 packs/day for 3.0 years (0.8 ttl pk-yrs)     Types: Cigarettes     Start date:      Quit date:      Years since quittin.7    Smokeless tobacco: Never   Vaping Use    Vaping status: Some Days    Substances: THC   Substance Use Topics    Alcohol use: No    Drug use: Not Currently     Types:  Marijuana     Comment: Vape pen for pain - rarely - medical marijuana        Review of Systems   Constitutional:  Negative for fever.   Respiratory:  Negative for cough and shortness of breath.    Cardiovascular:  Negative for chest pain and palpitations.   Gastrointestinal:  Positive for nausea and vomiting. Negative for abdominal pain.   Genitourinary:  Negative for dysuria and hematuria.   Musculoskeletal:  Positive for neck pain.   Skin:  Negative for rash.   Neurological:  Negative for syncope.   All other systems reviewed and are negative.      Physical Exam  ED Triage Vitals [09/08/24 0813]   Temperature Pulse Respirations Blood Pressure SpO2   98.7 °F (37.1 °C) 79 18 (!) 173/82 96 %      Temp Source Heart Rate Source Patient Position - Orthostatic VS BP Location FiO2 (%)   Oral Monitor Lying Right arm --      Pain Score       5             Orthostatic Vital Signs  Vitals:    09/08/24 0813 09/08/24 0900 09/08/24 0915 09/08/24 0945   BP: (!) 173/82 164/77 170/76 161/73   Pulse: 79  67 66   Patient Position - Orthostatic VS: Lying  Lying Lying       Physical Exam  Vitals and nursing note reviewed.   Constitutional:       General: She is not in acute distress.     Appearance: She is well-developed.   HENT:      Head: Normocephalic and atraumatic.   Eyes:      Conjunctiva/sclera: Conjunctivae normal.   Cardiovascular:      Rate and Rhythm: Normal rate and regular rhythm.      Heart sounds: No murmur heard.  Pulmonary:      Effort: Pulmonary effort is normal. No respiratory distress.      Breath sounds: Normal breath sounds.   Abdominal:      Palpations: Abdomen is soft.      Tenderness: There is no abdominal tenderness.   Musculoskeletal:         General: No swelling.      Cervical back: Neck supple.      Comments: Midline tenderness around C6-C7.  Shoulder pain noted around the scapular spine.  Obtaining x-rays.  Range of motion fully intact.  Strength fully intact.   Skin:     General: Skin is warm and dry.       Capillary Refill: Capillary refill takes less than 2 seconds.   Neurological:      Mental Status: She is alert.   Psychiatric:         Mood and Affect: Mood normal.         ED Medications  Medications   ALPRAZolam (XANAX) tablet 1 mg (has no administration in time range)   oxyCODONE (ROXICODONE) immediate release tablet 10 mg (has no administration in time range)   ondansetron (ZOFRAN-ODT) dispersible tablet 4 mg (4 mg Oral Given 9/8/24 0929)   acetaminophen (TYLENOL) tablet 650 mg (650 mg Oral Given 9/8/24 0929)       Diagnostic Studies  Results Reviewed       Procedure Component Value Units Date/Time    Basic metabolic panel [[id number]] Collected: 09/08/24 0936    Lab Status: Final result Specimen: Blood from Arm, Left Updated: 09/08/24 1104     Sodium 137 mmol/L      Potassium 4.7 mmol/L      Chloride 102 mmol/L      CO2 26 mmol/L      ANION GAP 9 mmol/L      BUN 14 mg/dL      Creatinine 0.85 mg/dL      Glucose 89 mg/dL      Calcium 9.9 mg/dL      eGFR 67 ml/min/1.73sq m     Narrative:      National Kidney Disease Foundation guidelines for Chronic Kidney Disease (CKD):     Stage 1 with normal or high GFR (GFR > 90 mL/min/1.73 square meters)    Stage 2 Mild CKD (GFR = 60-89 mL/min/1.73 square meters)    Stage 3A Moderate CKD (GFR = 45-59 mL/min/1.73 square meters)    Stage 3B Moderate CKD (GFR = 30-44 mL/min/1.73 square meters)    Stage 4 Severe CKD (GFR = 15-29 mL/min/1.73 square meters)    Stage 5 End Stage CKD (GFR <15 mL/min/1.73 square meters)  Note: GFR calculation is accurate only with a steady state creatinine    CBC and differential [[id number]]  (Abnormal) Collected: 09/08/24 0936    Lab Status: Final result Specimen: Blood from Arm, Left Updated: 09/08/24 1000     WBC 2.77 Thousand/uL      RBC 3.64 Million/uL      Hemoglobin 11.4 g/dL      Hematocrit 35.9 %      MCV 99 fL      MCH 31.3 pg      MCHC 31.8 g/dL      RDW 13.8 %      MPV 8.9 fL      Platelets 185 Thousands/uL      nRBC 0 /100 WBCs       Segmented % 78 %      Immature Grans % 1 %      Lymphocytes % 14 %      Monocytes % 7 %      Eosinophils Relative 0 %      Basophils Relative 0 %      Absolute Neutrophils 2.15 Thousands/µL      Absolute Immature Grans 0.02 Thousand/uL      Absolute Lymphocytes 0.39 Thousands/µL      Absolute Monocytes 0.19 Thousand/µL      Eosinophils Absolute 0.01 Thousand/µL      Basophils Absolute 0.01 Thousands/µL                    XR shoulder 2+ views RIGHT   ED Interpretation by Glenn Wiley DO (09/08 1207)   No acute fracture or dislocation noted.      CT head without contrast   Final Result by Jamie Martinez DO (09/08 1041)      No acute intracranial abnormality.  Stable chronic microangiopathic changes within the brain.      Sinus opacification as described above including inspissated mucus within the left maxillary sinus similar to prior study. Bilateral mastoid and middle ear opacification.                  Workstation performed: [id number]         CT spine cervical without contrast   Final Result by Jamie Martinez DO (09/08 1045)      No acute osseous abnormality. Cervical spondylitic degenerative change with mild to moderate canal stenosis and foraminal narrowing.                  Workstation performed: [id number]               Procedures  Procedures      ED Course  ED Course as of 09/08/24 1209   Sun Sep 08, 2024   0855 ECG 12 lead  Normal sinus rhythm.  No signs of ischemia.               Identification of Seniors at Risk      Flowsheet Row Most Recent Value   (ISAR) Identification of Seniors at Risk    Before the illness or injury that brought you to the Emergency, did you need someone to help you on a regular basis? 0 Filed at: 09/08/2024 0815   In the last 24 hours, have you needed more help than usual? 1 Filed at: 09/08/2024 0815   Have you been hospitalized for one or more nights during the past 6 months? 0 Filed at: 09/08/2024 0815   In general, do you see well? 0 Filed at: 09/08/2024 0815    In general, do you have serious problems with your memory? 0 Filed at: 09/08/2024 0815   Do you take more than three different medications every day? 1 Filed at: 09/08/2024 0815   ISAR Score 2 Filed at: 09/08/2024 0815                                Medical Decision Making  X-rays of the shoulder to rule out fracture or dislocation however not suspecting high probability.  CT head and neck to rule out acute fracture or intracranial abnormality.  Treating nausea with Zofran.  Do not think you need fluids at this time as there is only 2 episodes of vomiting.    Head and neck showed no acute abnormalities as well as lab work was unremarkable.  Shoulder x-ray was normal as well.  Pain is likely due to musculoskeletal in origin.  Patient will be discharged.  Her normal medication was given here in the emergency department while she is waiting for discharge.  Good return precautions noted.      Amount and/or Complexity of Data Reviewed  Labs: ordered.  Radiology: ordered.  ECG/medicine tests:  Decision-making details documented in ED Course.    Risk  OTC drugs.  Prescription drug management.          Disposition  Final diagnoses:   Neck pain   Shoulder pain   Headache     Time reflects when diagnosis was documented in both MDM as applicable and the Disposition within this note       Time User Action Codes Description Comment    9/8/2024 12:08 PM Glenn Wiley Add [M54.2] Neck pain     9/8/2024 12:08 PM Cari Wileym Add [M25.519] Shoulder pain     9/8/2024 12:08 PM Cari Wileym Add [R51.9] Headache           ED Disposition       ED Disposition   Discharge    Condition   Stable    Date/Time   Sun Sep 8, 2024 1147    Comment   [place] Bunn discharge to home/self care.                   Follow-up Information    None         Patient's Medications   Discharge Prescriptions    No medications on file     No discharge procedures on file.    PDMP Review       None             ED Provider  Attending physically available  and evaluated [place] Bunn. I managed the patient along with the ED Attending.    Electronically Signed by           Glenn Wiley DO  09/08/24 4040

## 2024-09-09 ENCOUNTER — IN HOME VISIT (OUTPATIENT)
Dept: WOUND CARE | Facility: HOSPITAL | Age: 74
End: 2024-09-09
Payer: MEDICARE

## 2024-09-09 DIAGNOSIS — T14.8XXA TRAUMATIC INJURY TO SKIN OR SUBCUTANEOUS TISSUE: Primary | ICD-10-CM

## 2024-09-09 DIAGNOSIS — L03.116 CELLULITIS OF LEFT ANKLE: ICD-10-CM

## 2024-09-09 LAB
ATRIAL RATE: 74 BPM
P AXIS: 16 DEGREES
PR INTERVAL: 190 MS
QRS AXIS: -10 DEGREES
QRSD INTERVAL: 106 MS
QT INTERVAL: 366 MS
QTC INTERVAL: 406 MS
T WAVE AXIS: 27 DEGREES
VENTRICULAR RATE: 74 BPM

## 2024-09-09 PROCEDURE — 99348 HOME/RES VST EST LOW MDM 30: CPT | Performed by: NURSE PRACTITIONER

## 2024-09-09 PROCEDURE — 93010 ELECTROCARDIOGRAM REPORT: CPT | Performed by: INTERNAL MEDICINE

## 2024-09-09 NOTE — PROGRESS NOTES
[facility] WOUND CARE MANAGEMENT   AND HYPERBARIC MEDICINE CENTER       Patient ID: Florida Bunn is a 73 y.o. female Date of Birth 1950     Location of Service: [facility]    Performed wound round with: Wound team     Chief Complaint : Left  outer ankle    Wound Instructions:  Wound:  Left outer ankle  Discontinue previous wound order  Cleanse the wound bed with NSS   Apply non-sting skin prep to periwound area  Apply Betadine to wound bed, then cover with ABD and rolled gauze  Frequency : Daily and prn for soiling  Call primary care physician for oral antibiotic for cellulitis on the left ankle    Continue to offload  Offload all wounds  Turn and reposition frequently  Instruct / Assist with weight shifting in wheelchair  Increase protein intake.  Monitor for any sign of infection or worsening, inform PCP or patient's primary physician in your facility.      Allergies  Penicillins and Codeine      Assessment & Plan:  1. Traumatic injury to skin or subcutaneous tissue  Assessment & Plan:  Left ankle  Onset unknown, seems chronic  As per patient, she accidentally bumped her left ankle, complaining of pain.  Wound bed is 100% slough, positive erythema on the periwound area  Local wound care with Betadine  Facility nurse to call primary care physician to start patient on oral antibiotic for cellulitis  Follow-up next week  2. Cellulitis of left ankle  Assessment & Plan:  Left ankle  Positive erythema, positive pain on palpation  Primary care physician to order oral antibiotics                   Subjective:   7/15/2024This is a 73 y.o., female referred to our service for wound/ skin alterations on left ankle.Patient have a complex medical history including but not limited to  has a past medical history of Cauda equina syndrome with neurogenic bladder (HCC), Depression, recurrent (HCC), Multiple sclerosis (HCC), Rheumatoid arthritis (HCC) . Patient was referred by Senior Care Team. Patient was seen in  "collaboration with the facility wound team.     Wound History:   Patient is a poor historian and was not able to provide enough information for the wound.  As per patient, she acquired wound while admitted at [facility].  Patient significant others thought that it probably from wheelchair.    Received patient in bed, nonambulatory.  Wheelchair-bound.  Needs assistance with turning and repositioning in bed.  Denies pain.  Denies diabetes.  Non-smoker.    7/22/2024 Follow up for wound on the left ankle . Received patient, not in distress. Facility staff did not report any significant issues related to the wound. Patient was asleep during visit, as per significant others, patient did not get any sleep due to frequent going to the bathroom.     8/12/2024 Follow up for wound on the left ankle and new comsult for wound on the buttocks . Received patient, not in distress. Facility staff did not report any significant issues related to the wound. Denies pain.      8/26/2024 Follow up for wound on the left ankle and buttocks. Received patient, not in distress. Facility staff did not report any significant issues related to the wound.  Denies pain.    9/9/2024 Follow up for wound on the left ankle.  Patient was complaining of pain on the left outer ankle, verbalize \" I bumped my ankle\".  Denies fever.                    Review of Systems   Reason unable to perform ROS: asleep.       Objective:    Physical Exam  Constitutional:       Appearance: Normal appearance.   Cardiovascular:      Rate and Rhythm: Normal rate.   Pulmonary:      Effort: Pulmonary effort is normal.   Musculoskeletal:      Right lower leg: No edema.      Left lower leg: No edema.      Comments: Positive pedal pulses  With overlapping toes     Skin:     Findings: Lesion present.      Comments: Left ankle: Wound size is 0.5 x 0.5 x 0.1 cm.,  100% slough, no drainage, periwound positive erythema, positive tenderness on palpation   Neurological:      Mental " Status: She is alert.              Procedures           Patient's care was coordinated with nursing facility staff. Recent vitals, labs and updated medications were reviewed on EMR or chart system of facility. Past Medical, surgical, social, medication and allergy history and patient's previous records were reviewed and updated as appropriate: Most up-to date information is available in the facility EMR where the patient is currently admitted.    Patient Active Problem List   Diagnosis    Vitamin D deficiency    Drug-induced constipation    Chronic pain    Multiple sclerosis (HCC)    Adopted    Wheelchair dependence    Neurogenic bladder    Medical marijuana use    Excessive carbohydrate intake    Continuous opioid dependence (HCC)    Depression, recurrent (HCC)    Acquired hypothyroidism    Positive colorectal cancer screening using Cologuard test    Chronic UTI    Self-catheterizes urinary bladder    Traumatic injury to skin or subcutaneous tissue    Dermatitis associated with incontinence    Cellulitis of left ankle     Past Medical History:   Diagnosis Date    Acute cystitis     Anxiety     Arthritis     Cauda equina syndrome with neurogenic bladder (HCC)     Depression, recurrent (HCC) 02/07/2022    Disease of thyroid gland     Dysuria     Multiple sclerosis (HCC)     Neurogenic bladder     Nocturia     Rheumatoid arthritis (HCC)     UTI (urinary tract infection)      Past Surgical History:   Procedure Laterality Date    BREAST EXCISIONAL BIOPSY Bilateral     greater than 12 years ago    CYSTOSCOPY  2012    KNEE SURGERY       Social History     Socioeconomic History    Marital status: /Civil Union     Spouse name: None    Number of children: None    Years of education: None    Highest education level: None   Occupational History    None   Tobacco Use    Smoking status: Former     Current packs/day: 0.00     Average packs/day: 0.3 packs/day for 3.0 years (0.8 ttl pk-yrs)     Types: Cigarettes     Start  date:      Quit date:      Years since quittin.7    Smokeless tobacco: Never   Vaping Use    Vaping status: Some Days    Substances: THC   Substance and Sexual Activity    Alcohol use: No    Drug use: Not Currently     Types: Marijuana     Comment: Vape pen for pain - rarely - medical marijuana    Sexual activity: Not Currently   Other Topics Concern    None   Social History Narrative    None     Social Determinants of Health     Financial Resource Strain: Low Risk  (3/4/2024)    [facility]    Overall Financial Resource Strain (CARDIA)     Difficulty of Paying Living Expenses: Not very hard   Recent Concern: Financial Resource Strain - Medium Risk (2024)    Overall Financial Resource Strain (CARDIA)     Difficulty of Paying Living Expenses: Somewhat hard   Food Insecurity: No Food Insecurity (3/4/2024)    [facility]    Hunger Vital Sign     Worried About Running Out of Food in the Last Year: Never true     Ran Out of Food in the Last Year: Never true   Transportation Needs: No Transportation Needs (3/4/2024)    [facility]    PRAPARE - Transportation     Lack of Transportation (Medical): No     Lack of Transportation (Non-Medical): No   Physical Activity: Not on file   Stress: Not on file   Social Connections: Not on file   Intimate Partner Violence: Not At Risk (3/4/2024)    [facility]    Humiliation, Afraid, Rape, and Kick questionnaire     Fear of Current or Ex-Partner: No     Emotionally Abused: No     Physically Abused: No     Sexually Abused: No   Housing Stability: Low Risk  (3/4/2024)    [facility]    Housing Stability Vital Sign     Unable to Pay for Housing in the Last Year: No      Number of Places Lived in the Last Year: 1     Unstable Housing in the Last Year: No        Current Outpatient Medications:     ALPRAZolam (XANAX) 1 mg tablet, Take 1 tablet (1 mg total) by mouth 2 (two) times a day as needed for anxiety, Disp: 60 tablet, Rfl: 1    aspirin-dipyridamole (AGGRENOX)  mg per 12 hr capsule, Take 1 capsule by mouth every 12 (twelve) hours, Disp: , Rfl:     baclofen 10 mg tablet, Take 1 tablet (10 mg total) by mouth 4 (four) times a day, Disp: 360 tablet, Rfl: 1    bisacodyl (DULCOLAX) 5 mg EC tablet, Please follow prep instructions provided by the office. (Patient not taking: Reported on 9/8/2024), Disp: 2 tablet, Rfl: 0    buPROPion (WELLBUTRIN SR) 200 MG 12 hr tablet, Take 1 tablet by mouth 2 (two) times a day  , Disp: , Rfl:     carBAMazepine (TEGretol XR) 200 mg 12 hr tablet, Take 200 mg by mouth 2 (two) times a day  , Disp: , Rfl:     DULoxetine (CYMBALTA) 30 mg delayed release capsule, Take 1 capsule (30 mg total) by mouth daily (Patient not taking: Reported on 9/8/2024), Disp: 30 capsule, Rfl: 5    ergocalciferol (VITAMIN D2) 50,000 units, Take 1 capsule (50,000 Units total) by mouth once a week (Patient not taking: Reported on 9/8/2024), Disp: 12 capsule, Rfl: 1    levothyroxine (Synthroid) 100 mcg tablet, Take with 200 mcg daily total 300 mcg, Disp: 90 tablet, Rfl: 1    levothyroxine 200 mcg tablet, TAKE 1 TABLET BY MOUTH  DAILY, Disp: , Rfl:     Linzess 290 MCG CAPS, Take 290 mcg by mouth in the morning, Disp: , Rfl:     methadone (DOLOPHINE) 10 mg tablet, Take 30 mg by mouth 3 (three) times a day,, Disp: , Rfl:     methylphenidate (RITALIN) 20 MG tablet, Take 20 mg by mouth in the morning, Disp: , Rfl:     morphine (MS CONTIN) 60 mg 12 hr tablet, 2 tabs in am; 1 tab mid-morning; 1 tab mid afternoon; 2 tabs at bedtime.  Long term medication, Disp: , Rfl:     oxyCODONE (ROXICODONE) 15 mg immediate release tablet, Take 5 mg by mouth every 6 (six) hours as needed for moderate  "pain, Disp: , Rfl:     pantoprazole (PROTONIX) 40 mg tablet, Take 1 tablet (40 mg total) by mouth daily, Disp: 90 tablet, Rfl: 1    PARoxetine (PAXIL) 30 mg tablet, Take 40 mg by mouth in the morning, Disp: , Rfl:     polyethylene glycol (MIRALAX) 17 g packet, Take 17 g by mouth 2 (two) times a day, Disp: , Rfl:     prochlorperazine (COMPAZINE) 25 mg suppository, Insert 25 mg into the rectum daily as needed for nausea or vomiting, Disp: , Rfl:     QUEtiapine (SEROquel XR) 50 mg, Take 25 mg by mouth daily at bedtime, Disp: , Rfl:     traZODone (DESYREL) 150 mg tablet, Take 1 tablet (150 mg total) by mouth daily at bedtime (Patient not taking: Reported on 9/8/2024), Disp: 90 tablet, Rfl: 1  Family History   Adopted: Yes   Problem Relation Age of Onset    No Known Problems Mother     No Known Problems Father               Coordination of Care: Wound team aware of the treatment plan. Facility nurse will provide wound treatment and monitor the wound for any changes.     Patient / Staff education : Patient / Staff was given education on sign of infection and pressure ulcer prevention. Patient/ Staff verbalized understanding     Follow up :  Two weeks    Voice-recognition software may have been used in the preparation of this document. Occasional wrong word or \"sound-alike\" substitutions may have occurred due to the inherent limitations of voice recognition software. Interpretation should be guided by context.      JASON Castaneda  "

## 2024-09-09 NOTE — ASSESSMENT & PLAN NOTE
Left ankle  Onset unknown, seems chronic  As per patient, she accidentally bumped her left ankle, complaining of pain.  Wound bed is 100% slough, positive erythema on the periwound area  Local wound care with Betadine  Facility nurse to call primary care physician to start patient on oral antibiotic for cellulitis  Follow-up next week

## 2024-09-09 NOTE — ASSESSMENT & PLAN NOTE
Left ankle  Positive erythema, positive pain on palpation  Primary care physician to order oral antibiotics

## 2024-09-09 NOTE — LETTER
Patient:  Florida Bunn   1950           JASON Castaneda saw Florida Bunn for a wound care visit on 9/9/2024. See below for information relating to this visit.      Chief Complaint   Patient presents with    Follow Up Wound Care Visit        Assessment/Plan:  1. Traumatic injury to skin or subcutaneous tissue  Assessment & Plan:  Left ankle  Onset unknown, seems chronic  As per patient, she accidentally bumped her left ankle, complaining of pain.  Wound bed is 100% slough, positive erythema on the periwound area  Local wound care with Betadine  Facility nurse to call primary care physician to start patient on oral antibiotic for cellulitis  Follow-up next week  2. Cellulitis of left ankle  Assessment & Plan:  Left ankle  Positive erythema, positive pain on palpation  Primary care physician to order oral antibiotics         Orders:  Florida Bunn  1950  Wound:  Left outer ankle  Discontinue previous wound order  Cleanse the wound bed with NSS   Apply non-sting skin prep to periwound area  Apply Betadine to wound bed, then cover with ABD and rolled gauze  Frequency : Daily and prn for soiling  Call primary care physician for oral antibiotic for cellulitis on the left ankle    Continue to offload  Offload all wounds  Turn and reposition frequently  Instruct / Assist with weight shifting in wheelchair  Increase protein intake.  Monitor for any sign of infection or worsening, inform PCP or patient's primary physician in your facility.        Follow Up:  Return in about 1 week (around 9/16/2024).       [facility] hours are 8:00 am - 4:30 pm Monday through Friday. The center phone number is [phone]. You can also contact me directly thru my email at [email or web address] or thru tiger text. If it is an emergency, please contact the PCP or patient's attending physician in your facility.     Sincerely,    Electronically signed by JASON Castaneda    Patient  : Florida Bunn    1950

## 2024-09-16 ENCOUNTER — IN HOME VISIT (OUTPATIENT)
Dept: WOUND CARE | Facility: HOSPITAL | Age: 74
End: 2024-09-16
Payer: MEDICARE

## 2024-09-16 DIAGNOSIS — L03.116 CELLULITIS OF LEFT ANKLE: ICD-10-CM

## 2024-09-16 DIAGNOSIS — T14.8XXA TRAUMATIC INJURY TO SKIN OR SUBCUTANEOUS TISSUE: Primary | ICD-10-CM

## 2024-09-16 PROCEDURE — 99348 HOME/RES VST EST LOW MDM 30: CPT | Performed by: NURSE PRACTITIONER

## 2024-09-16 NOTE — ASSESSMENT & PLAN NOTE
Left ankle  On oral antibiotic for cellulitis  Wound showed improvement, decrease in pain, decrease redness

## 2024-09-16 NOTE — ASSESSMENT & PLAN NOTE
Left ankle  Onset unknown, seems chronic  Wound improved, covered with scab, redness on the surrounding tissue improved compared to last visit  Local wound care with Betadine  On oral antibiotic for cellulitis  Follow-up next week

## 2024-09-16 NOTE — PROGRESS NOTES
[facility] WOUND CARE MANAGEMENT   AND HYPERBARIC MEDICINE CENTER       Patient ID: Florida Bunn is a 73 y.o. female Date of Birth 1950     Location of Service: [facility]    Performed wound round with: Wound team     Chief Complaint : Left  outer ankle    Wound Instructions:  Wound:  Left outer ankle  Discontinue previous wound order  Cleanse the wound bed with NSS   Apply non-sting skin prep to periwound area  Apply Betadine to wound bed, then cover with ABD and rolled gauze  Frequency : Daily and prn for soiling      Continue to offload  Offload all wounds  Turn and reposition frequently  Instruct / Assist with weight shifting in wheelchair  Increase protein intake.  Monitor for any sign of infection or worsening, inform PCP or patient's primary physician in your facility.      Allergies  Penicillins and Codeine      Assessment & Plan:  1. Traumatic injury to skin or subcutaneous tissue  Assessment & Plan:  Left ankle  Onset unknown, seems chronic  Wound improved, covered with scab, redness on the surrounding tissue improved compared to last visit  Local wound care with Betadine  On oral antibiotic for cellulitis  Follow-up next week  2. Cellulitis of left ankle  Assessment & Plan:  Left ankle  On oral antibiotic for cellulitis  Wound showed improvement, decrease in pain, decrease redness                     Subjective:   7/15/2024This is a 73 y.o., female referred to our service for wound/ skin alterations on left ankle.Patient have a complex medical history including but not limited to  has a past medical history of Cauda equina syndrome with neurogenic bladder (HCC), Depression, recurrent (HCC), Multiple sclerosis (HCC), Rheumatoid arthritis (HCC) . Patient was referred by Senior Care Team. Patient was seen in collaboration with the facility wound team.     Wound History:   Patient is a poor historian and was not able to provide enough information for the wound.  As per patient, she acquired wound  "while admitted at [facility].  Patient significant others thought that it probably from wheelchair.    Received patient in bed, nonambulatory.  Wheelchair-bound.  Needs assistance with turning and repositioning in bed.  Denies pain.  Denies diabetes.  Non-smoker.    7/22/2024 Follow up for wound on the left ankle . Received patient, not in distress. Facility staff did not report any significant issues related to the wound. Patient was asleep during visit, as per significant others, patient did not get any sleep due to frequent going to the bathroom.     8/12/2024 Follow up for wound on the left ankle and new comsult for wound on the buttocks . Received patient, not in distress. Facility staff did not report any significant issues related to the wound. Denies pain.      8/26/2024 Follow up for wound on the left ankle and buttocks. Received patient, not in distress. Facility staff did not report any significant issues related to the wound.  Denies pain.    9/9/2024 Follow up for wound on the left ankle.  Patient was complaining of pain on the left outer ankle, verbalize \" I bumped my ankle\".  Denies fever.    9/16/2024 Follow up for wound on the left ankle . Received patient, not in distress. Facility staff did not report any significant issues related to the wound. Denies pain. Currently on oral antibiotic.                       Review of Systems   Reason unable to perform ROS: asleep.       Objective:    Physical Exam  Constitutional:       Appearance: Normal appearance.   Cardiovascular:      Rate and Rhythm: Normal rate.   Pulmonary:      Effort: Pulmonary effort is normal.   Musculoskeletal:      Right lower leg: No edema.      Left lower leg: No edema.      Comments: Positive pedal pulses  With overlapping toes     Skin:     Findings: Lesion present.      Comments: Left ankle: Wound size is 0.5 x 0.5 x 0.1 cm.,  100% scab, no drainage, periwound positive erythema, positive tenderness on palpation "   Neurological:      Mental Status: She is alert.              Procedures           Patient's care was coordinated with nursing facility staff. Recent vitals, labs and updated medications were reviewed on EMR or chart system of facility. Past Medical, surgical, social, medication and allergy history and patient's previous records were reviewed and updated as appropriate: Most up-to date information is available in the facility EMR where the patient is currently admitted.    Patient Active Problem List   Diagnosis    Vitamin D deficiency    Drug-induced constipation    Chronic pain    Multiple sclerosis (HCC)    Adopted    Wheelchair dependence    Neurogenic bladder    Medical marijuana use    Excessive carbohydrate intake    Continuous opioid dependence (HCC)    Depression, recurrent (HCC)    Acquired hypothyroidism    Positive colorectal cancer screening using Cologuard test    Chronic UTI    Self-catheterizes urinary bladder    Traumatic injury to skin or subcutaneous tissue    Dermatitis associated with incontinence    Cellulitis of left ankle     Past Medical History:   Diagnosis Date    Acute cystitis     Anxiety     Arthritis     Cauda equina syndrome with neurogenic bladder (HCC)     Depression, recurrent (HCC) 02/07/2022    Disease of thyroid gland     Dysuria     Multiple sclerosis (HCC)     Neurogenic bladder     Nocturia     Rheumatoid arthritis (HCC)     UTI (urinary tract infection)      Past Surgical History:   Procedure Laterality Date    BREAST EXCISIONAL BIOPSY Bilateral     greater than 12 years ago    CYSTOSCOPY  2012    KNEE SURGERY       Social History     Socioeconomic History    Marital status: /Civil Union     Spouse name: None    Number of children: None    Years of education: None    Highest education level: None   Occupational History    None   Tobacco Use    Smoking status: Former     Current packs/day: 0.00     Average packs/day: 0.3 packs/day for 3.0 years (0.8 ttl pk-yrs)      Types: Cigarettes     Start date:      Quit date:      Years since quittin.7    Smokeless tobacco: Never   Vaping Use    Vaping status: Some Days    Substances: THC   Substance and Sexual Activity    Alcohol use: No    Drug use: Not Currently     Types: Marijuana     Comment: Vape pen for pain - rarely - medical marijuana    Sexual activity: Not Currently   Other Topics Concern    None   Social History Narrative    None     Social Determinants of Health     Financial Resource Strain: Low Risk  (3/4/2024)    Received from [facility], [facility]    Overall Financial Resource Strain (CARDIA)     Difficulty of Paying Living Expenses: Not very hard   Recent Concern: Financial Resource Strain - Medium Risk (2024)    Overall Financial Resource Strain (CARDIA)     Difficulty of Paying Living Expenses: Somewhat hard   Food Insecurity: No Food Insecurity (3/4/2024)    Received from [facility], [facility]    Hunger Vital Sign     Worried About Running Out of Food in the Last Year: Never true     Ran Out of Food in the Last Year: Never true   Transportation Needs: No Transportation Needs (3/4/2024)    Received from [facility], [facility]    PRAPARE - Transportation     Lack of Transportation (Medical): No     Lack of Transportation (Non-Medical): No   Physical Activity: Not on file   Stress: Not on file   Social Connections: Not on file   Intimate Partner Violence: Not At Risk (3/4/2024)    Received from [facility], [facility]    Humiliation, Afraid, Rape, and Kick questionnaire     Fear of Current or Ex-Partner: No     Emotionally Abused: No     Physically Abused: No     Sexually Abused: No   Housing Stability: Low Risk  (3/4/2024)    Received from [facility], [facility]    Housing Stability Vital Sign     Unable to Pay for Housing  in the Last Year: No     Number of Places Lived in the Last Year: 1     Unstable Housing in the Last Year: No        Current Outpatient Medications:     ALPRAZolam (XANAX) 1 mg tablet, Take 1 tablet (1 mg total) by mouth 2 (two) times a day as needed for anxiety, Disp: 60 tablet, Rfl: 1    aspirin-dipyridamole (AGGRENOX)  mg per 12 hr capsule, Take 1 capsule by mouth every 12 (twelve) hours, Disp: , Rfl:     baclofen 10 mg tablet, Take 1 tablet (10 mg total) by mouth 4 (four) times a day, Disp: 360 tablet, Rfl: 1    bisacodyl (DULCOLAX) 5 mg EC tablet, Please follow prep instructions provided by the office. (Patient not taking: Reported on 9/8/2024), Disp: 2 tablet, Rfl: 0    buPROPion (WELLBUTRIN SR) 200 MG 12 hr tablet, Take 1 tablet by mouth 2 (two) times a day  , Disp: , Rfl:     carBAMazepine (TEGretol XR) 200 mg 12 hr tablet, Take 200 mg by mouth 2 (two) times a day  , Disp: , Rfl:     DULoxetine (CYMBALTA) 30 mg delayed release capsule, Take 1 capsule (30 mg total) by mouth daily (Patient not taking: Reported on 9/8/2024), Disp: 30 capsule, Rfl: 5    ergocalciferol (VITAMIN D2) 50,000 units, Take 1 capsule (50,000 Units total) by mouth once a week (Patient not taking: Reported on 9/8/2024), Disp: 12 capsule, Rfl: 1    levothyroxine (Synthroid) 100 mcg tablet, Take with 200 mcg daily total 300 mcg, Disp: 90 tablet, Rfl: 1    levothyroxine 200 mcg tablet, TAKE 1 TABLET BY MOUTH  DAILY, Disp: , Rfl:     Linzess 290 MCG CAPS, Take 290 mcg by mouth in the morning, Disp: , Rfl:     methadone (DOLOPHINE) 10 mg tablet, Take 30 mg by mouth 3 (three) times a day,, Disp: , Rfl:     methylphenidate (RITALIN) 20 MG tablet, Take 20 mg by mouth in the morning, Disp: , Rfl:     morphine (MS CONTIN) 60 mg 12 hr tablet, 2 tabs in am; 1 tab mid-morning; 1 tab mid afternoon; 2 tabs at bedtime.  Long term medication, Disp: , Rfl:     oxyCODONE (ROXICODONE) 15 mg immediate release tablet, Take 5 mg by mouth every 6 (six)  "hours as needed for moderate pain, Disp: , Rfl:     pantoprazole (PROTONIX) 40 mg tablet, Take 1 tablet (40 mg total) by mouth daily, Disp: 90 tablet, Rfl: 1    PARoxetine (PAXIL) 30 mg tablet, Take 40 mg by mouth in the morning, Disp: , Rfl:     polyethylene glycol (MIRALAX) 17 g packet, Take 17 g by mouth 2 (two) times a day, Disp: , Rfl:     prochlorperazine (COMPAZINE) 25 mg suppository, Insert 25 mg into the rectum daily as needed for nausea or vomiting, Disp: , Rfl:     QUEtiapine (SEROquel XR) 50 mg, Take 25 mg by mouth daily at bedtime, Disp: , Rfl:     traZODone (DESYREL) 150 mg tablet, Take 1 tablet (150 mg total) by mouth daily at bedtime (Patient not taking: Reported on 9/8/2024), Disp: 90 tablet, Rfl: 1  Family History   Adopted: Yes   Problem Relation Age of Onset    No Known Problems Mother     No Known Problems Father               Coordination of Care: Wound team aware of the treatment plan. Facility nurse will provide wound treatment and monitor the wound for any changes.     Patient / Staff education : Patient / Staff was given education on sign of infection and pressure ulcer prevention. Patient/ Staff verbalized understanding     Follow up :  Next week    Voice-recognition software may have been used in the preparation of this document. Occasional wrong word or \"sound-alike\" substitutions may have occurred due to the inherent limitations of voice recognition software. Interpretation should be guided by context.      JASON Castaneda  "

## 2024-09-23 ENCOUNTER — IN HOME VISIT (OUTPATIENT)
Dept: WOUND CARE | Facility: HOSPITAL | Age: 74
End: 2024-09-23
Payer: MEDICARE

## 2024-09-23 DIAGNOSIS — T14.8XXA TRAUMATIC INJURY TO SKIN OR SUBCUTANEOUS TISSUE: Primary | ICD-10-CM

## 2024-09-23 PROCEDURE — 99348 HOME/RES VST EST LOW MDM 30: CPT | Performed by: NURSE PRACTITIONER

## 2024-09-23 NOTE — ASSESSMENT & PLAN NOTE
Left ankle  Onset unknown, seems chronic  Wound is stable, covered with stable eschar, with no obvious sign of infection  Local wound care with Betadine  Education provided to the staff not to apply tape on the patient's skin  Follow-up in 2 weeks

## 2024-09-23 NOTE — PROGRESS NOTES
[facility] WOUND CARE MANAGEMENT   AND HYPERBARIC MEDICINE CENTER       Patient ID: Florida Bunn is a 73 y.o. female Date of Birth 1950     Location of Service: [facility]    Performed wound round with: Wound team     Chief Complaint : Left  outer ankle    Wound Instructions:  Wound:  Left outer ankle  Discontinue previous wound order  Cleanse the wound bed with NSS   Apply non-sting skin prep to periwound area  Apply Betadine to wound bed, then cover with ABD and rolled gauze  Frequency : Twice a week and prn for soiling    No tape on the skin  Continue to offload  Offload all wounds  Turn and reposition frequently  Instruct / Assist with weight shifting in wheelchair  Increase protein intake.  Monitor for any sign of infection or worsening, inform PCP or patient's primary physician in your facility.      Allergies  Penicillins and Codeine      Assessment & Plan:  1. Traumatic injury to skin or subcutaneous tissue  Assessment & Plan:  Left ankle  Onset unknown, seems chronic  Wound is stable, covered with stable eschar, with no obvious sign of infection  Local wound care with Betadine  Education provided to the staff not to apply tape on the patient's skin  Follow-up in 2 weeks                       Subjective:   7/15/2024This is a 73 y.o., female referred to our service for wound/ skin alterations on left ankle.Patient have a complex medical history including but not limited to  has a past medical history of Cauda equina syndrome with neurogenic bladder (HCC), Depression, recurrent (HCC), Multiple sclerosis (HCC), Rheumatoid arthritis (HCC) . Patient was referred by Senior Care Team. Patient was seen in collaboration with the facility wound team.     Wound History:   Patient is a poor historian and was not able to provide enough information for the wound.  As per patient, she acquired wound while admitted at [facility]  Patient significant others thought that it probably from  "wheelchair.    Received patient in bed, nonambulatory.  Wheelchair-bound.  Needs assistance with turning and repositioning in bed.  Denies pain.  Denies diabetes.  Non-smoker.    7/22/2024 Follow up for wound on the left ankle . Received patient, not in distress. Facility staff did not report any significant issues related to the wound. Patient was asleep during visit, as per significant others, patient did not get any sleep due to frequent going to the bathroom.     8/12/2024 Follow up for wound on the left ankle and new comsult for wound on the buttocks . Received patient, not in distress. Facility staff did not report any significant issues related to the wound. Denies pain.      8/26/2024 Follow up for wound on the left ankle and buttocks. Received patient, not in distress. Facility staff did not report any significant issues related to the wound.  Denies pain.    9/9/2024 Follow up for wound on the left ankle.  Patient was complaining of pain on the left outer ankle, verbalize \" I bumped my ankle\".  Denies fever.    9/16/2024 Follow up for wound on the left ankle . Received patient, not in distress. Facility staff did not report any significant issues related to the wound. Denies pain. Currently on oral antibiotic.     9/23/2024 Follow up for wound on the Left ankle . Received patient, not in distress. Facility staff did not report any significant issues related to the wound.  Patient denies pain.                      Review of Systems   Reason unable to perform ROS: asleep.       Objective:    Physical Exam  Constitutional:       Appearance: Normal appearance.   Cardiovascular:      Rate and Rhythm: Normal rate.   Pulmonary:      Effort: Pulmonary effort is normal.   Musculoskeletal:      Right lower leg: No edema.      Left lower leg: No edema.      Comments: Positive pedal pulses  With overlapping toes     Skin:     Findings: Lesion present.      Comments: Left ankle: Wound size is 0.5 x 0.5 x 100% eschar, " no drainage, periwound normal, denies pain, with no obvious sign of infection   Neurological:      Mental Status: She is alert.              Procedures           Patient's care was coordinated with nursing facility staff. Recent vitals, labs and updated medications were reviewed on EMR or chart system of facility. Past Medical, surgical, social, medication and allergy history and patient's previous records were reviewed and updated as appropriate: Most up-to date information is available in the facility EMR where the patient is currently admitted.    Patient Active Problem List   Diagnosis    Vitamin D deficiency    Drug-induced constipation    Chronic pain    Multiple sclerosis (HCC)    Adopted    Wheelchair dependence    Neurogenic bladder    Medical marijuana use    Excessive carbohydrate intake    Continuous opioid dependence (HCC)    Depression, recurrent (HCC)    Acquired hypothyroidism    Positive colorectal cancer screening using Cologuard test    Chronic UTI    Self-catheterizes urinary bladder    Traumatic injury to skin or subcutaneous tissue    Dermatitis associated with incontinence    Cellulitis of left ankle     Past Medical History:   Diagnosis Date    Acute cystitis     Anxiety     Arthritis     Cauda equina syndrome with neurogenic bladder (HCC)     Depression, recurrent (HCC) 02/07/2022    Disease of thyroid gland     Dysuria     Multiple sclerosis (HCC)     Neurogenic bladder     Nocturia     Rheumatoid arthritis (HCC)     UTI (urinary tract infection)      Past Surgical History:   Procedure Laterality Date    BREAST EXCISIONAL BIOPSY Bilateral     greater than 12 years ago    CYSTOSCOPY  2012    KNEE SURGERY       Social History     Socioeconomic History    Marital status: /Civil Union     Spouse name: None    Number of children: None    Years of education: None    Highest education level: None   Occupational History    None   Tobacco Use    Smoking status: Former     Current packs/day:  0.00     Average packs/day: 0.3 packs/day for 3.0 years (0.8 ttl pk-yrs)     Types: Cigarettes     Start date:      Quit date:      Years since quittin.7    Smokeless tobacco: Never   Vaping Use    Vaping status: Some Days    Substances: THC   Substance and Sexual Activity    Alcohol use: No    Drug use: Not Currently     Types: Marijuana     Comment: Vape pen for pain - rarely - medical marijuana    Sexual activity: Not Currently   Other Topics Concern    None   Social History Narrative    None     Social Determinants of Health     Financial Resource Strain: Low Risk  (3/4/2024)    Received from [facility], [facility]    Overall Financial Resource Strain (CARDIA)     Difficulty of Paying Living Expenses: Not very hard   Recent Concern: Financial Resource Strain - Medium Risk (2024)    Overall Financial Resource Strain (CARDIA)     Difficulty of Paying Living Expenses: Somewhat hard   Food Insecurity: No Food Insecurity (3/4/2024)    Received from [facility], [facility]    Hunger Vital Sign     Worried About Running Out of Food in the Last Year: Never true     Ran Out of Food in the Last Year: Never true   Transportation Needs: No Transportation Needs (3/4/2024)    Received from [facility], [facility]    PRAPARE - Transportation     Lack of Transportation (Medical): No     Lack of Transportation (Non-Medical): No   Physical Activity: Not on file   Stress: Not on file   Social Connections: Not on file   Intimate Partner Violence: Not At Risk (3/4/2024)    Received from [facility], [facility]    Humiliation, Afraid, Rape, and Kick questionnaire     Fear of Current or Ex-Partner: No     Emotionally Abused: No     Physically Abused: No     Sexually Abused: No   Housing Stability: Low Risk  (3/4/2024)    Received from [facility], [facility]    Housing Stability Vital Sign     Unable to Pay for Housing in the Last Year: No     Number of Places Lived in the Last Year: 1     Unstable Housing in the Last Year: No        Current Outpatient Medications:     ALPRAZolam (XANAX) 1 mg tablet, Take 1 tablet (1 mg total) by mouth 2 (two) times a day as needed for anxiety, Disp: 60 tablet, Rfl: 1    aspirin-dipyridamole (AGGRENOX)  mg per 12 hr capsule, Take 1 capsule by mouth every 12 (twelve) hours, Disp: , Rfl:     baclofen 10 mg tablet, Take 1 tablet (10 mg total) by mouth 4 (four) times a day, Disp: 360 tablet, Rfl: 1    bisacodyl (DULCOLAX) 5 mg EC tablet, Please follow prep instructions provided by the office. (Patient not taking: Reported on 9/8/2024), Disp: 2 tablet, Rfl: 0    buPROPion (WELLBUTRIN SR) 200 MG 12 hr tablet, Take 1 tablet by mouth 2 (two) times a day  , Disp: , Rfl:     carBAMazepine (TEGretol XR) 200 mg 12 hr tablet, Take 200 mg by mouth 2 (two) times a day  , Disp: , Rfl:     DULoxetine (CYMBALTA) 30 mg delayed release capsule, Take 1 capsule (30 mg total) by mouth daily (Patient not taking: Reported on 9/8/2024), Disp: 30 capsule, Rfl: 5    ergocalciferol (VITAMIN D2) 50,000 units, Take 1 capsule (50,000 Units total) by mouth once a week (Patient not taking: Reported on 9/8/2024), Disp: 12 capsule, Rfl: 1    levothyroxine (Synthroid) 100 mcg tablet, Take with 200 mcg daily total 300 mcg, Disp: 90 tablet, Rfl: 1    levothyroxine 200 mcg tablet, TAKE 1 TABLET BY MOUTH  DAILY, Disp: , Rfl:     Linzess 290 MCG CAPS, Take 290 mcg by mouth in the morning, Disp: , Rfl:     methadone (DOLOPHINE) 10 mg tablet, Take 30 mg by mouth 3 (three) times a day,, Disp: , Rfl:     methylphenidate (RITALIN) 20 MG tablet, Take 20 mg by mouth in the morning, Disp: , Rfl:     morphine (MS CONTIN) 60 mg 12 hr tablet, 2 tabs in am; 1 tab mid-morning; 1 tab mid afternoon; 2 tabs at bedtime.  Long term medication, Disp: , Rfl:     oxyCODONE  "(ROXICODONE) 15 mg immediate release tablet, Take 5 mg by mouth every 6 (six) hours as needed for moderate pain, Disp: , Rfl:     pantoprazole (PROTONIX) 40 mg tablet, Take 1 tablet (40 mg total) by mouth daily, Disp: 90 tablet, Rfl: 1    PARoxetine (PAXIL) 30 mg tablet, Take 40 mg by mouth in the morning, Disp: , Rfl:     polyethylene glycol (MIRALAX) 17 g packet, Take 17 g by mouth 2 (two) times a day, Disp: , Rfl:     prochlorperazine (COMPAZINE) 25 mg suppository, Insert 25 mg into the rectum daily as needed for nausea or vomiting, Disp: , Rfl:     QUEtiapine (SEROquel XR) 50 mg, Take 25 mg by mouth daily at bedtime, Disp: , Rfl:     traZODone (DESYREL) 150 mg tablet, Take 1 tablet (150 mg total) by mouth daily at bedtime (Patient not taking: Reported on 9/8/2024), Disp: 90 tablet, Rfl: 1  Family History   Adopted: Yes   Problem Relation Age of Onset    No Known Problems Mother     No Known Problems Father               Coordination of Care: Wound team aware of the treatment plan. Facility nurse will provide wound treatment and monitor the wound for any changes.     Patient / Staff education : Patient / Staff was given education on sign of infection and pressure ulcer prevention. Patient/ Staff verbalized understanding     Follow up :  2 weeks    Voice-recognition software may have been used in the preparation of this document. Occasional wrong word or \"sound-alike\" substitutions may have occurred due to the inherent limitations of voice recognition software. Interpretation should be guided by context.      JASON Castaneda  "

## 2024-10-07 ENCOUNTER — IN HOME VISIT (OUTPATIENT)
Dept: WOUND CARE | Facility: HOSPITAL | Age: 74
End: 2024-10-07
Payer: MEDICARE

## 2024-10-07 DIAGNOSIS — T14.8XXA TRAUMATIC INJURY TO SKIN OR SUBCUTANEOUS TISSUE: Primary | ICD-10-CM

## 2024-10-07 PROCEDURE — 99347 HOME/RES VST EST SF MDM 20: CPT | Performed by: NURSE PRACTITIONER

## 2024-10-07 NOTE — ASSESSMENT & PLAN NOTE
Left ankle  Onset unknown, seems chronic  Wound is healed  Skin prep for two weeks  Continue to offload  Sign off. Facility staff will continue to provide treatment and monitor the wound. Can reconsult wound nurse practitioner if wound failed to heal or worsened.

## 2024-10-07 NOTE — PROGRESS NOTES
[facility] WOUND CARE MANAGEMENT   AND HYPERBARIC MEDICINE CENTER       Patient ID: Florida Bunn is a 73 y.o. female Date of Birth 1950     Location of Service: [facility]    Performed wound round with: Wound team     Chief Complaint : Left  outer ankle    Wound Instructions:  Wound:  Left outer ankle  Discontinue previous wound order  Cleanse the wound bed with NSS   Apply non-sting skin prep to periwound area and wound bed  Frequency : Daily and prn for soiling for two week  Sign off. Facility staff will continue to provide treatment and monitor the wound. Can reconsult wound nurse practitioner if wound failed to heal or worsened.     No tape on the skin  Continue to offload  Offload all wounds  Turn and reposition frequently  Instruct / Assist with weight shifting in wheelchair  Increase protein intake.  Monitor for any sign of infection or worsening, inform PCP or patient's primary physician in your facility.      Allergies  Penicillins and Codeine      Assessment & Plan:  1. Traumatic injury to skin or subcutaneous tissue  Assessment & Plan:  Left ankle  Onset unknown, seems chronic  Wound is healed  Skin prep for two weeks  Continue to offload  Sign off. Facility staff will continue to provide treatment and monitor the wound. Can reconsult wound nurse practitioner if wound failed to heal or worsened.                            Subjective:   7/15/2024This is a 73 y.o., female referred to our service for wound/ skin alterations on left ankle.Patient have a complex medical history including but not limited to  has a past medical history of Cauda equina syndrome with neurogenic bladder (HCC), Depression, recurrent (HCC), Multiple sclerosis (HCC), Rheumatoid arthritis (HCC) . Patient was referred by Senior Care Team. Patient was seen in collaboration with the facility wound team.     Wound History:   Patient is a poor historian and was not able to provide enough information for the wound.  As per  "patient, she acquired wound while admitted at [facility].  Patient significant others thought that it probably from wheelchair.    Received patient in bed, nonambulatory.  Wheelchair-bound.  Needs assistance with turning and repositioning in bed.  Denies pain.  Denies diabetes.  Non-smoker.    7/22/2024 Follow up for wound on the left ankle . Received patient, not in distress. Facility staff did not report any significant issues related to the wound. Patient was asleep during visit, as per significant others, patient did not get any sleep due to frequent going to the bathroom.     8/12/2024 Follow up for wound on the left ankle and new comsult for wound on the buttocks . Received patient, not in distress. Facility staff did not report any significant issues related to the wound. Denies pain.      8/26/2024 Follow up for wound on the left ankle and buttocks. Received patient, not in distress. Facility staff did not report any significant issues related to the wound.  Denies pain.    9/9/2024 Follow up for wound on the left ankle.  Patient was complaining of pain on the left outer ankle, verbalize \" I bumped my ankle\".  Denies fever.    9/16/2024 Follow up for wound on the left ankle . Received patient, not in distress. Facility staff did not report any significant issues related to the wound. Denies pain. Currently on oral antibiotic.     9/23/2024 Follow up for wound on the Left ankle . Received patient, not in distress. Facility staff did not report any significant issues related to the wound.  Patient denies pain.    10/7/2024 Follow up for wound on the left ankle . Received patient, not in distress. Facility staff did not report any significant issues related to the wound.                           Review of Systems   Reason unable to perform ROS: asleep.       Objective:    Physical Exam  Constitutional:       Appearance: Normal appearance.   Cardiovascular:      Rate and Rhythm: Normal rate.   Pulmonary:    "   Effort: Pulmonary effort is normal.   Musculoskeletal:      Right lower leg: No edema.      Left lower leg: No edema.      Comments: Positive pedal pulses  With overlapping toes     Skin:     Findings: No lesion.      Comments: Left ankle: Wound is healed   Neurological:      Mental Status: She is alert.              Procedures           Patient's care was coordinated with nursing facility staff. Recent vitals, labs and updated medications were reviewed on EMR or chart system of facility. Past Medical, surgical, social, medication and allergy history and patient's previous records were reviewed and updated as appropriate: Most up-to date information is available in the facility EMR where the patient is currently admitted.    Patient Active Problem List   Diagnosis    Vitamin D deficiency    Drug-induced constipation    Chronic pain    Multiple sclerosis (HCC)    Adopted    Wheelchair dependence    Neurogenic bladder    Medical marijuana use    Excessive carbohydrate intake    Continuous opioid dependence (HCC)    Depression, recurrent (HCC)    Acquired hypothyroidism    Positive colorectal cancer screening using Cologuard test    Chronic UTI    Self-catheterizes urinary bladder    Traumatic injury to skin or subcutaneous tissue    Dermatitis associated with incontinence    Cellulitis of left ankle     Past Medical History:   Diagnosis Date    Acute cystitis     Anxiety     Arthritis     Cauda equina syndrome with neurogenic bladder (HCC)     Depression, recurrent (HCC) 02/07/2022    Disease of thyroid gland     Dysuria     Multiple sclerosis (HCC)     Neurogenic bladder     Nocturia     Rheumatoid arthritis (HCC)     UTI (urinary tract infection)      Past Surgical History:   Procedure Laterality Date    BREAST EXCISIONAL BIOPSY Bilateral     greater than 12 years ago    CYSTOSCOPY  2012    KNEE SURGERY       Social History     Socioeconomic History    Marital status: /Civil Union     Spouse name: None     Number of children: None    Years of education: None    Highest education level: None   Occupational History    None   Tobacco Use    Smoking status: Former     Current packs/day: 0.00     Average packs/day: 0.3 packs/day for 3.0 years (0.8 ttl pk-yrs)     Types: Cigarettes     Start date:      Quit date:      Years since quittin.7    Smokeless tobacco: Never   Vaping Use    Vaping status: Some Days    Substances: THC   Substance and Sexual Activity    Alcohol use: No    Drug use: Not Currently     Types: Marijuana     Comment: Vape pen for pain - rarely - medical marijuana    Sexual activity: Not Currently   Other Topics Concern    None   Social History Narrative    None     Social Determinants of Health     Financial Resource Strain: Low Risk  (3/4/2024)    [facility]    Overall Financial Resource Strain (CARDIA)     Difficulty of Paying Living Expenses: Not very hard   Recent Concern: Financial Resource Strain - Medium Risk (2024)    Overall Financial Resource Strain (CARDIA)     Difficulty of Paying Living Expenses: Somewhat hard   Food Insecurity: No Food Insecurity (3/4/2024)    [facility]    Hunger Vital Sign     Worried About Running Out of Food in the Last Year: Never true     Ran Out of Food in the Last Year: Never true   Transportation Needs: No Transportation Needs (3/4/2024)    [facility]    PRAPARE - Transportation     Lack of Transportation (Medical): No     Lack of Transportation (Non-Medical): No   Physical Activity: Not on file   Stress: Not on file   Social Connections: Not on file   Intimate Partner Violence: Not At Risk (3/4/2024)    [facility]    Humiliation, Afraid, Rape, and Kick questionnaire     Fear of Current or Ex-Partner: No      Emotionally Abused: No     Physically Abused: No     Sexually Abused: No   Housing Stability: Low Risk  (3/4/2024)    [facility]    Housing Stability Vital Sign     Unable to Pay for Housing in the Last Year: No     Number of Places Lived in the Last Year: 1     Unstable Housing in the Last Year: No        Current Outpatient Medications:     ALPRAZolam (XANAX) 1 mg tablet, Take 1 tablet (1 mg total) by mouth 2 (two) times a day as needed for anxiety, Disp: 60 tablet, Rfl: 1    aspirin-dipyridamole (AGGRENOX)  mg per 12 hr capsule, Take 1 capsule by mouth every 12 (twelve) hours, Disp: , Rfl:     baclofen 10 mg tablet, Take 1 tablet (10 mg total) by mouth 4 (four) times a day, Disp: 360 tablet, Rfl: 1    bisacodyl (DULCOLAX) 5 mg EC tablet, Please follow prep instructions provided by the office. (Patient not taking: Reported on 9/8/2024), Disp: 2 tablet, Rfl: 0    buPROPion (WELLBUTRIN SR) 200 MG 12 hr tablet, Take 1 tablet by mouth 2 (two) times a day  , Disp: , Rfl:     carBAMazepine (TEGretol XR) 200 mg 12 hr tablet, Take 200 mg by mouth 2 (two) times a day  , Disp: , Rfl:     DULoxetine (CYMBALTA) 30 mg delayed release capsule, Take 1 capsule (30 mg total) by mouth daily (Patient not taking: Reported on 9/8/2024), Disp: 30 capsule, Rfl: 5    ergocalciferol (VITAMIN D2) 50,000 units, Take 1 capsule (50,000 Units total) by mouth once a week (Patient not taking: Reported on 9/8/2024), Disp: 12 capsule, Rfl: 1    levothyroxine (Synthroid) 100 mcg tablet, Take with 200 mcg daily total 300 mcg, Disp: 90 tablet, Rfl: 1    levothyroxine 200 mcg tablet, TAKE 1 TABLET BY MOUTH  DAILY, Disp: , Rfl:     Linzess 290 MCG CAPS, Take 290 mcg by mouth in the morning, Disp: , Rfl:     methadone (DOLOPHINE) 10 mg tablet, Take 30 mg by mouth 3 (three) times a day,, Disp: , Rfl:     methylphenidate (RITALIN) 20 MG tablet, Take 20 mg by mouth in the morning, Disp:  ", Rfl:     morphine (MS CONTIN) 60 mg 12 hr tablet, 2 tabs in am; 1 tab mid-morning; 1 tab mid afternoon; 2 tabs at bedtime.  Long term medication, Disp: , Rfl:     oxyCODONE (ROXICODONE) 15 mg immediate release tablet, Take 5 mg by mouth every 6 (six) hours as needed for moderate pain, Disp: , Rfl:     pantoprazole (PROTONIX) 40 mg tablet, Take 1 tablet (40 mg total) by mouth daily, Disp: 90 tablet, Rfl: 1    PARoxetine (PAXIL) 30 mg tablet, Take 40 mg by mouth in the morning, Disp: , Rfl:     polyethylene glycol (MIRALAX) 17 g packet, Take 17 g by mouth 2 (two) times a day, Disp: , Rfl:     prochlorperazine (COMPAZINE) 25 mg suppository, Insert 25 mg into the rectum daily as needed for nausea or vomiting, Disp: , Rfl:     QUEtiapine (SEROquel XR) 50 mg, Take 25 mg by mouth daily at bedtime, Disp: , Rfl:     traZODone (DESYREL) 150 mg tablet, Take 1 tablet (150 mg total) by mouth daily at bedtime (Patient not taking: Reported on 9/8/2024), Disp: 90 tablet, Rfl: 1  Family History   Adopted: Yes   Problem Relation Age of Onset    No Known Problems Mother     No Known Problems Father               Coordination of Care: Wound team aware of the treatment plan. Facility nurse will provide wound treatment and monitor the wound for any changes.     Patient / Staff education : Patient / Staff was given education on sign of infection and pressure ulcer prevention. Patient/ Staff verbalized understanding     Follow up :  Sign off. Facility staff will continue to provide treatment and monitor the wound. Can reconsult wound nurse practitioner if wound failed to heal or worsened.       Voice-recognition software may have been used in the preparation of this document. Occasional wrong word or \"sound-alike\" substitutions may have occurred due to the inherent limitations of voice recognition software. Interpretation should be guided by context.      JASON Castaneda  "

## 2024-10-07 NOTE — LETTER
Patient:  Florida Bunn   1950           JASON Castaneda saw Florida Bunn for a wound care visit on 10/7/2024. See below for information relating to this visit.      Chief Complaint   Patient presents with    Follow Up Wound Care Visit        Assessment/Plan:  1. Traumatic injury to skin or subcutaneous tissue  Assessment & Plan:  Left ankle  Onset unknown, seems chronic  Wound is healed  Skin prep for two weeks  Continue to offload  Sign off. Facility staff will continue to provide treatment and monitor the wound. Can reconsult wound nurse practitioner if wound failed to heal or worsened.            Orders:  Florida Bunn  1950    Wound:  Left outer ankle  Discontinue previous wound order  Cleanse the wound bed with NSS   Apply non-sting skin prep to periwound area and wound bed  Frequency : Daily and prn for soiling for two week  Sign off. Facility staff will continue to provide treatment and monitor the wound. Can reconsult wound nurse practitioner if wound failed to heal or worsened.     No tape on the skin  Continue to offload  Offload all wounds  Turn and reposition frequently  Instruct / Assist with weight shifting in wheelchair  Increase protein intake.  Monitor for any sign of infection or worsening, inform PCP or patient's primary physician in your facility.    Follow Up:  Return if symptoms worsen or fail to improve.       [facility] hours are 8:00 am - 4:30 pm Monday through Friday. The center phone number is [phone]. You can also contact me directly thru my email at [email or web address] or thru tiger text. If it is an emergency, please contact the PCP or patient's attending physician in your facility.     Sincerely,    Electronically signed by JASON Castaneda    Patient : Florida Oliverbalbir    1950

## 2024-11-19 DIAGNOSIS — L25.8 DERMATITIS ASSOCIATED WITH INCONTINENCE: Primary | ICD-10-CM

## 2024-11-19 DIAGNOSIS — R32 DERMATITIS ASSOCIATED WITH INCONTINENCE: Primary | ICD-10-CM

## 2024-11-20 NOTE — TELEPHONE ENCOUNTER
Needs refill on xanax 1 mg, quantity 60, with refills sent to [facility]

## 2025-01-29 ENCOUNTER — CONSULTATION (OUTPATIENT)
Dept: URBAN - METROPOLITAN AREA CLINIC 6 | Facility: CLINIC | Age: 75
End: 2025-01-29

## 2025-01-29 DIAGNOSIS — H50.10: ICD-10-CM

## 2025-01-29 PROCEDURE — 92012 INTRM OPH EXAM EST PATIENT: CPT

## 2025-01-29 ASSESSMENT — VISUAL ACUITY
OS_SC: 20/200
OU_SC: J10
OD_SC: 20/25
OS_PH: 20/50-2

## 2025-01-29 ASSESSMENT — KERATOMETRY
OD_AXISANGLE2_DEGREES: 77
OS_AXISANGLE2_DEGREES: 63
OD_K2POWER_DIOPTERS: 44.50
OS_AXISANGLE_DEGREES: 153
OD_K1POWER_DIOPTERS: 44.00
OS_K2POWER_DIOPTERS: 44.75
OS_K1POWER_DIOPTERS: 44.25
OD_AXISANGLE_DEGREES: 167

## 2025-01-29 ASSESSMENT — TONOMETRY
OS_IOP_MMHG: 14
OD_IOP_MMHG: 14

## 2025-08-09 ENCOUNTER — HOSPITAL ENCOUNTER (EMERGENCY)
Facility: HOSPITAL | Age: 75
Discharge: HOME | End: 2025-08-09
Attending: EMERGENCY MEDICINE | Admitting: EMERGENCY MEDICINE
Payer: MEDICARE

## 2025-08-09 ENCOUNTER — APPOINTMENT (EMERGENCY)
Dept: RADIOLOGY | Facility: HOSPITAL | Age: 75
End: 2025-08-09
Payer: MEDICARE